# Patient Record
Sex: MALE | Race: ASIAN | NOT HISPANIC OR LATINO | Employment: UNEMPLOYED | URBAN - METROPOLITAN AREA
[De-identification: names, ages, dates, MRNs, and addresses within clinical notes are randomized per-mention and may not be internally consistent; named-entity substitution may affect disease eponyms.]

---

## 2024-01-22 ENCOUNTER — TELEPHONE (OUTPATIENT)
Dept: FAMILY MEDICINE | Facility: CLINIC | Age: 29
End: 2024-01-22
Payer: MEDICAID

## 2024-01-22 NOTE — TELEPHONE ENCOUNTER
Tasha with [facility] Refugee [facility] requesting patient to be seen this week due to the following comorbidities.     +HIV, HTN, HEP C and chemical use  VIANEY ok to see patient on 01/24 at 8:10 am.     Notified DANILO and Tasha, they will coordinate transportation. MS requested an in person . [place] to VIANEY.     Patient and family also scheduled for the following appts.   Refugee labs 01/25  Refugee screening 02/01 Dr. Zamora

## 2024-01-23 DIAGNOSIS — Z02.89 REFUGEE HEALTH EXAMINATION: Primary | ICD-10-CM

## 2024-01-24 ENCOUNTER — PATIENT OUTREACH (OUTPATIENT)
Dept: NURSING | Facility: CLINIC | Age: 29
End: 2024-01-24
Payer: MEDICAID

## 2024-01-24 ENCOUNTER — PATIENT OUTREACH (OUTPATIENT)
Dept: CARE COORDINATION | Facility: CLINIC | Age: 29
End: 2024-01-24

## 2024-01-24 ENCOUNTER — TELEPHONE (OUTPATIENT)
Dept: PULMONOLOGY | Facility: CLINIC | Age: 29
End: 2024-01-24

## 2024-01-24 ENCOUNTER — OFFICE VISIT (OUTPATIENT)
Dept: FAMILY MEDICINE | Facility: CLINIC | Age: 29
End: 2024-01-24
Payer: MEDICAID

## 2024-01-24 VITALS
SYSTOLIC BLOOD PRESSURE: 110 MMHG | WEIGHT: 140.9 LBS | BODY MASS INDEX: 23.47 KG/M2 | RESPIRATION RATE: 16 BRPM | HEIGHT: 65 IN | TEMPERATURE: 99.9 F | HEART RATE: 85 BPM | OXYGEN SATURATION: 98 % | DIASTOLIC BLOOD PRESSURE: 80 MMHG

## 2024-01-24 DIAGNOSIS — Z21 ASYMPTOMATIC HUMAN IMMUNODEFICIENCY VIRUS (HIV) INFECTION STATUS (H): Primary | ICD-10-CM

## 2024-01-24 DIAGNOSIS — F12.10 MARIJUANA ABUSE: ICD-10-CM

## 2024-01-24 DIAGNOSIS — J43.8 OTHER EMPHYSEMA (H): Primary | ICD-10-CM

## 2024-01-24 DIAGNOSIS — Z02.89 REFUGEE HEALTH EXAMINATION: ICD-10-CM

## 2024-01-24 DIAGNOSIS — Z23 NEED FOR VACCINATION: ICD-10-CM

## 2024-01-24 DIAGNOSIS — F11.21 OPIOID DEPENDENCE IN REMISSION (H): ICD-10-CM

## 2024-01-24 DIAGNOSIS — I27.20 PULMONARY HYPERTENSION (H): ICD-10-CM

## 2024-01-24 DIAGNOSIS — B18.2 CHRONIC HEPATITIS C WITHOUT HEPATIC COMA (H): ICD-10-CM

## 2024-01-24 DIAGNOSIS — Z00.00 PREVENTATIVE HEALTH CARE: ICD-10-CM

## 2024-01-24 PROCEDURE — 90471 IMMUNIZATION ADMIN: CPT | Performed by: FAMILY MEDICINE

## 2024-01-24 PROCEDURE — 99215 OFFICE O/P EST HI 40 MIN: CPT | Mod: 25 | Performed by: FAMILY MEDICINE

## 2024-01-24 PROCEDURE — 91320 SARSCV2 VAC 30MCG TRS-SUC IM: CPT | Performed by: FAMILY MEDICINE

## 2024-01-24 PROCEDURE — 90480 ADMN SARSCOV2 VAC 1/ONLY CMP: CPT | Performed by: FAMILY MEDICINE

## 2024-01-24 PROCEDURE — 90715 TDAP VACCINE 7 YRS/> IM: CPT | Performed by: FAMILY MEDICINE

## 2024-01-24 ASSESSMENT — ACTIVITIES OF DAILY LIVING (ADL): DEPENDENT_IADLS:: INDEPENDENT

## 2024-01-24 NOTE — LETTER
[facility]  Patient Centered Plan of Care  About Me:        Patient Name:  Carolina Torres    YOB: 1995  Age:         28 year old   Selvin MRN:    [id number] Telephone Information:  Home Phone [phone]   Mobile [phone]       Address:  [address]  [place] [address] Email address:  No e-mail address on record      Emergency Contact(s)    Name Relationship Lgl Grd Work Phone Home Phone Mobile Phone   1. MICHAELA JUÁREZ Spouse    [phone]   2. ORA SHELBY Other    [phone]   3. GENNARO Other    [phone]           Primary language:  Sandhya     needed? Yes   [facility] [employer]:  [phone] op. 1  Other communication barriers:Language barrier    Preferred Method of Communication:     Current living arrangement: I live in a private home with family    Mobility Status/ Medical Equipment: Independent        Health Maintenance  Health Maintenance Reviewed: Due/Overdue       My Access Plan  Medical Emergency 911   Primary Clinic Line [facility] [phone]   24 Hour Appointment Line [phone] or  [phone]) (toll-free)   24 Hour Nurse Line [phone] (toll-free)   Preferred Urgent Care [facility] [phone]     [facility]  [phone]     Preferred Pharmacy [facility] - [address]     Behavioral Health Crisis Line The National Suicide Prevention Lifeline at 1-547.102.1664 or Text/Call 158           My Care Team Members  Patient Care Team         Relationship Specialty Notifications Start End    Clinic - [facility] PCP - General   1/24/24     Phone: [phone] Fax: [phone]         [address] [facility] [address]    Nanette Morales MD Fellow Gastroenterology  1/24/24     Phone: [phone] Fax: [phone]         [address] [place] [address] [address] [facility] [address]    Ramon Torres, JACKLYNW  Community Health Worker Primary Care - CC Admissions 1/24/24     Phone: [phone] Fax: [phone]         [address] [place] [id number]    Elizabeth Heaton, RN Lead Care Coordinator Primary Care - CC Admissions 1/24/24     Hayley Andrade MD MD Critical Care  1/26/24      [address] [facility] [address]                My Care Plans  Self Management and Treatment Plan    Care Plan  Care Plan: Cardiology       Problem: Pulmonary hypertension       Goal: Patient will attend his cardiology clinic appointment in the next 6 months.       Start Date: 1/24/2024 Expected End Date: 7/31/2024    This Visit's Progress: 10%    Note:     Barriers: language barrier, low literacy, noncompliance, and lack of knowledge how to navigate complex health care system  Strengths: motivated to attend appt  Patient expressed understanding of goal: Yes    Action steps to achieve this goal:  1. I will answer my phone when I am contacted to schedule my appointment.  2. I will attend my ECHO appointment as scheduled 2/7/2024 at 9:00am.  3. I will attend my initial cardiology appointment on TBD  4. I will schedule a follow up appointment with my cardiologist if it is recommended to do so while I am at the clinic.  5. I will follow up with CCC regarding this goal at each outreach until it is completed.                               Care Plan: Pulmonology       Problem: Pulmonary hypertension       Goal: Patient will attend his pulmonology clinic appointment in the next 6 months.       Start Date: 1/24/2024 Expected End Date: 7/31/2024    This Visit's Progress: 10%    Note:     Barriers: language barrier, low literacy, noncompliance, and lack of knowledge how to navigate complex health care system  Strengths: motivated to attend appt  Patient expressed understanding of goal: Yes    Action steps to achieve this goal:  1. I will answer my phone when I am contacted to schedule my appointment.  2. I will attend my pulmonology appointment as  scheduled on  2/22/24 at 2:45pm for PFT and 3:45pm with Dr Andrade.  3. I will schedule a follow up appointment with my pulmonologist if it is recommended to do so while I am at the clinic.  4. I will follow up with CCC regarding this goal at each outreach until it is completed.                                 Action Plans on File:                       Advance Care Plans/Directives:   Advanced Care Plan/Directives on file:   No    Discussed with patient/caregiver(s):   Declined Further Information             My Medical and Care Information  Problem List   There is no problem list on file for this patient.     Current Medications and Allergies:  See printed Medication Report.    Care Coordination Start Date: 1/24/2024   Frequency of Care Coordination: No data recorded   Form Last Updated: 01/29/2024

## 2024-01-24 NOTE — TELEPHONE ENCOUNTER
Fabi, RN from pulmonary called to clarify referral to pulmonary.  Patient seen by provider today.  Diagnosis of patient condition required for pulmonary support.  If patient has pulmonary hypertension as indicated on the telephone encounter, cardiology would be more appropriate.    Pulmonary can be called to clarify at [phone] and can speak to any nursing.    Will route encounter to Dr. Du for an update.    José Miguel

## 2024-01-24 NOTE — PROGRESS NOTES
Clinic Care Coordination Contact  Clinic Care Coordination Contact  OUTREACH    Referral Information:  Referral Source: PCP    Primary Diagnosis: GI Disorders    Chief Complaint   Patient presents with    Clinic Care Coordination - Initial   Clinic Utilization  Difficulty keeping appointments:: No  Compliance Concerns: No  No-Show Concerns: No  No PCP office visit in Past Year: No  Utilization      No Show Count (past year)  0             ED Visits  0             Hospital Admissions  0                    Current as of: 1/28/2024 11:56 AM            Clinical Concerns:    [place] with spouse and 3 children.   Spouse - Henrik Per   Saw Raffy Sorianou Roberto - 9 yrs old son  Paw Raffy Ina - 6 yrs daughter  Saw Austyn Damian Roberto - 1 yr son     States they only received $75 since they arrived.   States only have a little bit of rice, and some meat.     [facility] benefits - food insecurities  -  already assisted family with [facility] benefits  - Dr Du reports no foods in home. CCRN updated  to follow up on this.  will call patient today with  to follow up on food insecurity.   - Patient agreed to be referred to [facility] for food shelves. nicolas Parikh speaking staff will reach out to patient tomorrow to drop off some foods.     : Lisset: [phone] and Manager is Kiet: [phone]    Health Insurance   - active health insurance pending insurance card to be mailed out.     [place] ID  - Patient will need to apply on his own after received work permit per .     Work permit  - still pending    Social security  - already applied after 45 days,  will follow up on status.     Echocardiogram  - scheduled on 2/7/2024 at 9am.     GI hepatology  - scheduled on 3/20/2024 at 8:00am for lab and 8:45am with Dr Nanette Morales.     Infectious disease  - CHW to assist   - staff send message to the team for review on  1/24/24.    Cardiology  - ECHO scheduled on 2/7/24  - CHW to assist     Pulmonology  - scheduled on 2/22/2024 at 2:45pm for PFT and 3:45pm with Dr Andrade    Psychiatry for longer term med mgmt  - scheduled on 5/9/2024 at 9:45am with FAWAD Collins CNP    [facility] service  - plan to referral patient for [facility] service to support with community resources.     Psychiatry and med mgmt  - CHW to follow up    Pain  Pain (GOAL):: No  Health Maintenance Reviewed: Due/Overdue   Clinical Pathway: None    Medication Management:  Medication review status: Medications reviewed and no changes reported per patient.           Functional Status:  Dependent ADLs:: Independent  Dependent IADLs:: Independent  Bed or wheelchair confined:: No  Mobility Status: Independent  Fallen 2 or more times in the past year?: No  Any fall with injury in the past year?: No    Living Situation:  Current living arrangement:: I live in a private home with family  Type of residence:: Apartment    Lifestyle & Psychosocial Needs:    Social Determinants of Health     Food Insecurity: Low Risk  (1/24/2024)    Food Insecurity     Within the past 12 months, did you worry that your food would run out before you got money to buy more?: No     Within the past 12 months, did the food you bought just not last and you didn t have money to get more?: No   Depression: Not at risk (1/24/2024)    PHQ-2     PHQ-2 Score: 0   Housing Stability: Low Risk  (1/24/2024)    Housing Stability     Do you have housing? : Yes     Are you worried about losing your housing?: No   Tobacco Use: High Risk (1/24/2024)    Patient History     Smoking Tobacco Use: Every Day     Smokeless Tobacco Use: Current     Passive Exposure: Current   Financial Resource Strain: Low Risk  (1/24/2024)    Financial Resource Strain     Within the past 12 months, have you or your family members you live with been unable to get utilities (heat, electricity) when it was really needed?: No   Alcohol Use: Not  on file   Transportation Needs: Low Risk  (1/24/2024)    Transportation Needs     Within the past 12 months, has lack of transportation kept you from medical appointments, getting your medicines, non-medical meetings or appointments, work, or from getting things that you need?: No   Physical Activity: Not on file   Interpersonal Safety: Low Risk  (1/24/2024)    Interpersonal Safety     Do you feel physically and emotionally safe where you currently live?: Yes     Within the past 12 months, have you been hit, slapped, kicked or otherwise physically hurt by someone?: No     Within the past 12 months, have you been humiliated or emotionally abused in other ways by your partner or ex-partner?: No   Stress: Not on file   Social Connections: Not on file     Diet:: Regular  Inadequate nutrition (GOAL):: No  Tube Feeding: No  Inadequate activity/exercise (GOAL):: No  Significant changes in sleep pattern (GOAL): No  Transportation means:: Regular car, Medical transport, Family     Mandaen or spiritual beliefs that impact treatment:: No  Mental health DX:: No  Mental health management concern (GOAL):: No  Chemical Dependency Status: No Current Concerns  Informal Support system:: Family, Children, [place] based, Friends, Spouse    Resources and Interventions:  Current Resources:      Community Resources: Other (see comment), [facility] Programs, County Worker  Supplies Currently Used at Home: None  Equipment Currently Used at Home: none     Advance Care Plan/Directive  Advanced Care Plans/Directives on file:: No  Discussed with patient/caregiver:: Declined Further Information    Referrals Placed: None     Care Plan:  Care Plan: Cardiology       Problem: Pulmonary hypertension       Goal: Patient will attend his cardiology clinic appointment in the next 6 months.       Start Date: 1/24/2024 Expected End Date: 7/31/2024    This Visit's Progress: 10%    Note:     Barriers: language barrier, low literacy, noncompliance, and lack of  knowledge how to navigate complex health care system  Strengths: motivated to attend appt  Patient expressed understanding of goal: Yes    Action steps to achieve this goal:  1. I will answer my phone when I am contacted to schedule my appointment.  2. I will attend my ECHO appointment as scheduled 2/7/2024 at 9:00am.  3. I will attend my initial cardiology appointment on TBD  4. I will schedule a follow up appointment with my cardiologist if it is recommended to do so while I am at the clinic.  5. I will follow up with [facility] regarding this goal at each outreach until it is completed.                               Care Plan: Pulmonology       Problem: Pulmonary hypertension       Goal: Patient will attend his pulmonology clinic appointment in the next 6 months.       Start Date: 1/24/2024 Expected End Date: 7/31/2024    This Visit's Progress: 10%    Note:     Barriers: language barrier, low literacy, noncompliance, and lack of knowledge how to navigate complex health care system  Strengths: motivated to attend appt  Patient expressed understanding of goal: Yes    Action steps to achieve this goal:  1. I will answer my phone when I am contacted to schedule my appointment.  2. I will attend my pulmonology appointment as scheduled on  2/22/24 at 2:45pm for PFT and 3:45pm with Dr Andrade.  3. I will schedule a follow up appointment with my pulmonologist if it is recommended to do so while I am at the clinic.  4. I will follow up with [facility] regarding this goal at each outreach until it is completed.                                  Future Appointments                In 3 days Minh Zamora MD; PHONE,  [facility] MOHINDER Frausto    In 1 week JN ECHO OP 2; JN HC ECHO STAFF [facility]    In 3 weeks MPBE PFT RM 2 M [facility] Specialty [facility] Beam, Beam    In 3 weeks Hayley Andrade MD [facility] Beam Beam    In 1 month  Rosalie Adams MD [facility] MOHINDER Frausto SPRO    In 1 month UC LAB [facility] Lab [facility]    In 1 month Nanette Morales MD [facility] Hepatology Clinic [facility]    In 3 months Codie Goode APRN CNP [facility] Mental Health & Addiction [facility]            Plan: CHW to assist wit ID and cardiology appts.

## 2024-01-24 NOTE — PROGRESS NOTES
Clinic Care Coordination Contact  Community Health Worker Initial Outreach    CHW Initial Information Gathering:  Referral Source: PCP  Preferred Hospital: [facility]  [phone]  Preferred Urgent Care: [facility], [phone]  Current living arrangement:: I live in a private home with family  Type of residence:: Apartment  Community Resources: Other (see comment)  Supplies Currently Used at Home: None  Equipment Currently Used at Home: none  Informal Support system:: Family  No PCP office visit in Past Year: No  Transportation means:: Medical transport, Other  CHW Additional Questions  If ED/Hospital discharge, follow-up appointment scheduled as recommended?: N/A  Medication changes made following ED/Hospital discharge?: N/A  MyChart active?: No  Patient agreeable to assistance with activating MyChart?: No    Patient accepts CC: Yes. Patient scheduled for assessment with CCC RN on 1/24/2024 at 11:00 AM. Patient noted desire to discuss family has no food; newly arrived refugees, he has multiple medical problems and needs help to get to all specialty appointments.     Per patient, he's been in the [place] only 1 [place] plus and has some foods at home but has no money. CCC was able to confirmed with resettlement agency  that they dropped out some foods and provided medical transportation to the clinic today. CCC RN is aware of the situation and on her raider to connect patient with resettlement agency and /manager.     /manager: Lisset: [phone] and Manager is Kiet: [phone].

## 2024-01-24 NOTE — TELEPHONE ENCOUNTER
" Health Call Center    Phone Message    May a detailed message be left on voicemail: yes     Reason for Call: Appointment Intake    Referring Provider Name: MADY PATTERSON    Diagnosis and/or Symptoms: Priority: 1-2 Weeks    Additional Information: new refugee from [place]; has pulmonary hypertension and other problems     Per protocols states: confirmed by right heart cath/angiogram. Writer unsure if this pt should be scheduled in general  or if the referral should be placed with cardiology. Please review since \"other problems\" are mentioned. Thank you     Action Taken: Message routed to:  [facility] pulmonology    Travel Screening: Not Applicable                                                                   "

## 2024-01-24 NOTE — TELEPHONE ENCOUNTER
Contacted the office of Dr Du for clarification on Pulmonary Referral as dx states 'Pumonary Hypertension and other problems'.  Per RN José Miguel Jarvis, she will check with MD and return call to clinic with clarification so that pt can be scheduled with appropriate specialty.

## 2024-01-24 NOTE — CONFIDENTIAL NOTE
DIAGNOSIS:  Chronic hepatitis C without hepatic coma (H)    Appt Date:  03.20.2024    NOTES STATUS DETAILS   OFFICE NOTE from referring provider Internal 01.24.2024 Brina Du MD    OFFICE NOTES from other specialists     DISCHARGE SUMMARY from hospital     MEDICATION LIST     LIVER BIOSPY (IF APPLICABLE)      PATHOLOGY REPORTS      IMAGING     ENDOSCOPY (IF AVAILABLE)     COLONOSCOPY (IF AVAILABLE)     ULTRASOUND LIVER     CT OF ABDOMEN     MRI OF LIVER     FIBROSCAN, US ELASTOGRAPHY, FIBROSIS SCAN, MR ELASTOGRAPHY     LABS     HEPATIC PANEL (LIVER PANEL)     BASIC METABOLIC PANEL     COMPLETE METABOLIC PANEL     COMPLETE BLOOD COUNT (CBC)     INTERNATIONAL NORMALIZED RATIO (INR)     HEPATITIS C ANTIBODY     HEPATITIS C VIRAL LOAD/PCR     HEPATITIS C GENOTYPE     HEPATITIS B SURFACE ANTIGEN     HEPATITIS B SURFACE ANTIBODY     HEPATITIS B DNA QUANT LEVEL     HEPATITIS B CORE ANTIBODY

## 2024-01-24 NOTE — PROGRESS NOTES
Assessment & Plan     This patient will have a full regular Refugee Health Exam next week. Today's visit was to name his major problems and initiate treatments/referrals so management in the [place] could begin.    Asymptomatic human immunodeficiency virus (HIV) infection status (H)    - Adult Infectious Disease  Referral  - Adult GI  Referral - Consult Only  - Primary Care - Care Coordination Referral    Pulmonary hypertension (H)  This is noted in his overseas paperwork, however, it is not clear how they diagnosed it. I will refer him to pulmonary and to cardiology and I will order an echocardiogram to get things started. His blood pressure today is good. I tried to explain to him that this problem is hurting his lungs and heart, so he really should stop smoking as that will additionally damage the lungs and heart.   - Adult Pulmonary Medicine  Referral  - Adult Cardiology Eval  Referral  - Adult GI  Referral - Consult Only  - Echocardiogram Complete  - Primary Care - Care Coordination Referral    Chronic hepatitis C without hepatic coma (H)  Refer to infectious disease for help with this. While this is important, it is likely a problem that can set aside while other issues are addressed first.  - Adult GI  Referral - Consult Only  - Echocardiogram Complete  - Primary Care - Care Coordination Referral    Emphysema  In light of his other medical problems, current smoking plus cannabis, examining doctor would appreciate pulmonary evaluation for recommendations on how best to help his breathing.    Preventative health care  reviewed  - REVIEW OF HEALTH MAINTENANCE PROTOCOL ORDERS  - Primary Care - Care Coordination Referral    Need for vaccination  Gave 2 shots today with his permission  - COVID-19 12+ (2023-24) (PFIZER)  - TDAP 10-64Y (ADACEL,BOOSTRIX)  - Primary Care - Care Coordination Referral    Opioid dependence in remission (H)  This is clearly noted in his  overseas paperwork. It is not clear how he transitioned from using to remission.  - Adult Mental Health  Referral  - Primary Care - Care Coordination Referral    Marijuana abuse  This is also a previous problem from his past. Will need to monitor him for future use  - Adult Mental Health  Referral  - Primary Care - Care Coordination Referral    Refugee health examination  While the arrival agency's  is likely the one in charge right now, he says his family is out of food. He knows he has phone numbers on his phone but he has no idea which number to call for what problem . I offered some support to help him get the message through that they need more food or help to get it.  - Primary Care - Care Coordination Referral      Review of external notes as documented elsewhere in note  Ordering of each unique test  Prescription drug management  62 minutes spent by me on the date of the encounter doing chart review, history and exam, documentation and further activities per the note      Nicotine/Tobacco Cessation  He reports that he has been smoking cigarettes. He has been exposed to tobacco smoke. His smokeless tobacco use includes chew.  Nicotine/Tobacco Cessation Plan  Self help information given to patient    I note that it is interesting that as he told me about his health problems, he started by saying the problems began when he . Most of the problems he has come from his alcohol and drug use BEFORE he got .             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  Helps his wife to care for  "their 3 kids. When helping, he feels able to do that.    Past health problems:  Used alcohol and drugs - but was not addicted.  Smokes now. Has some SOB when walking/doing stairs.  Last night drank alcohol with friends - claims he does not drink daily  No drugs    Since arrival, he thinks he and his family are good.  Parents have been in [place] for 8 years already. They are helping his family.    Thinks he could do a job. He hopes to go to school before getting a job. He wants to study to read.        Objective    /80   Pulse 85   Temp 99.9  F (37.7  C) (Oral)   Resp 16   Ht 1.652 m (5' 5.04\")   Wt 63.9 kg (140 lb 14.4 oz)   SpO2 98%   BMI 23.42 kg/m    Body mass index is 23.42 kg/m .  Physical Exam   GENERAL: alert and no distress  EYES: Eyes grossly normal to inspection, PERRL and conjunctivae and sclerae normal  NECK: right JVP noted  RESP: lungs clear to auscultation - no rales, rhonchi or wheezes but he has prolonged expiration  MS: no gross musculoskeletal defects noted, no edema  PSYCH: mentation appears normal, affect flat, judgement and insight intact, judgement and insight impaired, and appearance well groomed    No results found for any visits on 01/24/24.        Signed Electronically by: Brina Du MD    "

## 2024-01-25 ENCOUNTER — TELEPHONE (OUTPATIENT)
Dept: FAMILY MEDICINE | Facility: CLINIC | Age: 29
End: 2024-01-25

## 2024-01-25 ENCOUNTER — LAB (OUTPATIENT)
Dept: LAB | Facility: CLINIC | Age: 29
End: 2024-01-25
Payer: MEDICAID

## 2024-01-25 DIAGNOSIS — Z02.89 REFUGEE HEALTH EXAMINATION: ICD-10-CM

## 2024-01-25 LAB
BASOPHILS # BLD AUTO: 0 10E3/UL (ref 0–0.2)
BASOPHILS NFR BLD AUTO: 1 %
EOSINOPHIL # BLD AUTO: 0.1 10E3/UL (ref 0–0.7)
EOSINOPHIL NFR BLD AUTO: 3 %
ERYTHROCYTE [DISTWIDTH] IN BLOOD BY AUTOMATED COUNT: 13.7 % (ref 10–15)
HCT VFR BLD AUTO: 37.8 % (ref 40–53)
HGB BLD-MCNC: 12.4 G/DL (ref 13.3–17.7)
IMM GRANULOCYTES # BLD: 0 10E3/UL
IMM GRANULOCYTES NFR BLD: 0 %
LYMPHOCYTES # BLD AUTO: 1.9 10E3/UL (ref 0.8–5.3)
LYMPHOCYTES NFR BLD AUTO: 35 %
MCH RBC QN AUTO: 34.2 PG (ref 26.5–33)
MCHC RBC AUTO-ENTMCNC: 32.8 G/DL (ref 31.5–36.5)
MCV RBC AUTO: 104 FL (ref 78–100)
MONOCYTES # BLD AUTO: 0.5 10E3/UL (ref 0–1.3)
MONOCYTES NFR BLD AUTO: 9 %
NEUTROPHILS # BLD AUTO: 2.9 10E3/UL (ref 1.6–8.3)
NEUTROPHILS NFR BLD AUTO: 52 %
NRBC # BLD AUTO: 0 10E3/UL
NRBC BLD AUTO-RTO: 0 /100
PLATELET # BLD AUTO: 99 10E3/UL (ref 150–450)
RBC # BLD AUTO: 3.63 10E6/UL (ref 4.4–5.9)
WBC # BLD AUTO: 5.4 10E3/UL (ref 4–11)

## 2024-01-25 PROCEDURE — 86780 TREPONEMA PALLIDUM: CPT

## 2024-01-25 PROCEDURE — 87522 HEPATITIS C REVRS TRNSCRPJ: CPT

## 2024-01-25 PROCEDURE — 85025 COMPLETE CBC W/AUTO DIFF WBC: CPT

## 2024-01-25 PROCEDURE — 86803 HEPATITIS C AB TEST: CPT

## 2024-01-25 PROCEDURE — 86702 HIV-2 ANTIBODY: CPT | Mod: 59

## 2024-01-25 PROCEDURE — 86481 TB AG RESPONSE T-CELL SUSP: CPT

## 2024-01-25 PROCEDURE — 86706 HEP B SURFACE ANTIBODY: CPT

## 2024-01-25 PROCEDURE — 86701 HIV-1ANTIBODY: CPT | Mod: 59

## 2024-01-25 PROCEDURE — 99000 SPECIMEN HANDLING OFFICE-LAB: CPT

## 2024-01-25 PROCEDURE — 80053 COMPREHEN METABOLIC PANEL: CPT

## 2024-01-25 PROCEDURE — 87902 NFCT AGT GNTYP ALYS HEP C: CPT | Mod: 90

## 2024-01-25 PROCEDURE — 36415 COLL VENOUS BLD VENIPUNCTURE: CPT

## 2024-01-25 PROCEDURE — 86682 HELMINTH ANTIBODY: CPT | Mod: 90

## 2024-01-25 PROCEDURE — 86787 VARICELLA-ZOSTER ANTIBODY: CPT

## 2024-01-25 PROCEDURE — 86704 HEP B CORE ANTIBODY TOTAL: CPT

## 2024-01-25 PROCEDURE — 80061 LIPID PANEL: CPT

## 2024-01-25 PROCEDURE — 87389 HIV-1 AG W/HIV-1&-2 AB AG IA: CPT

## 2024-01-25 PROCEDURE — 87340 HEPATITIS B SURFACE AG IA: CPT

## 2024-01-25 NOTE — PROGRESS NOTES
[facility]   - JENNI referred 1 yr son son to [facility] today appt scheduled on 1/31/2024 at 12:30pm at [facility] location. Deannada will provider ride. Instructed parents to bring proof of resident such as letter or mail, no ID yet and refugee travel documents.     [employer]  - JENNI referred patient to [employer] Me [employer] on 1/29/2024    Thank you for submitting this connection to the local school district or cooperative on behalf of this child. The information from this form will go directly through secure electronic process to the district or cooperative based on the child's address provided.    Please save your referral ID for future reference or when calling Help Me Grow.    Your referral ID is [id number]    School enrollment  - Jl from [employer] will take the 2 older kids to school if not done yet by next week by renita manager.

## 2024-01-25 NOTE — TELEPHONE ENCOUNTER
This is new refugee and usually we'd like  from the resettlement agency handle due difficulties with insurance and complex. I am hoping they can help with transportation because they work closely with [employer] and know a way around to coordinate this. [facility] have high panel to mange at this time but if they can help, would greatly appreciate it.

## 2024-01-25 NOTE — TELEPHONE ENCOUNTER
Care Coordinator who brought patient to clinic had questions regarding ongoing appointments. Will [facility] staff be assisting with transportation requests for future speciality appointments.     WI added Amando's information into patient contacts.

## 2024-01-26 ENCOUNTER — TELEPHONE (OUTPATIENT)
Dept: PULMONOLOGY | Facility: CLINIC | Age: 29
End: 2024-01-26
Payer: MEDICAID

## 2024-01-26 DIAGNOSIS — I27.20 PULMONARY HYPERTENSION (H): Primary | ICD-10-CM

## 2024-01-26 LAB
ALBUMIN SERPL BCG-MCNC: 3.7 G/DL (ref 3.5–5.2)
ALP SERPL-CCNC: 121 U/L (ref 40–150)
ALT SERPL W P-5'-P-CCNC: 51 U/L (ref 0–70)
ANION GAP SERPL CALCULATED.3IONS-SCNC: 8 MMOL/L (ref 7–15)
AST SERPL W P-5'-P-CCNC: 66 U/L (ref 0–45)
BILIRUB SERPL-MCNC: 1 MG/DL
BUN SERPL-MCNC: 8.8 MG/DL (ref 6–20)
CALCIUM SERPL-MCNC: 9 MG/DL (ref 8.6–10)
CHLORIDE SERPL-SCNC: 107 MMOL/L (ref 98–107)
CHOLEST SERPL-MCNC: 72 MG/DL
CREAT SERPL-MCNC: 0.95 MG/DL (ref 0.67–1.17)
DEPRECATED HCO3 PLAS-SCNC: 21 MMOL/L (ref 22–29)
EGFRCR SERPLBLD CKD-EPI 2021: >90 ML/MIN/1.73M2
FASTING STATUS PATIENT QL REPORTED: ABNORMAL
GLUCOSE SERPL-MCNC: 89 MG/DL (ref 70–99)
HBV CORE AB SERPL QL IA: REACTIVE
HBV SURFACE AB SERPL IA-ACNC: 37.3 M[IU]/ML
HBV SURFACE AB SERPL IA-ACNC: REACTIVE M[IU]/ML
HBV SURFACE AG SERPL QL IA: NONREACTIVE
HCV AB SERPL QL IA: REACTIVE
HDLC SERPL-MCNC: 19 MG/DL
HIV 1+2 AB+HIV1 P24 AG SERPL QL IA: REACTIVE
LDLC SERPL CALC-MCNC: 45 MG/DL
NONHDLC SERPL-MCNC: 53 MG/DL
POTASSIUM SERPL-SCNC: 3.7 MMOL/L (ref 3.4–5.3)
PROT SERPL-MCNC: 8.1 G/DL (ref 6.4–8.3)
SODIUM SERPL-SCNC: 136 MMOL/L (ref 135–145)
T PALLIDUM AB SER QL: NONREACTIVE
TRIGL SERPL-MCNC: 41 MG/DL
VZV IGG SER QL IA: >4000 INDEX
VZV IGG SER QL IA: POSITIVE

## 2024-01-26 NOTE — TELEPHONE ENCOUNTER
Rico Wilson,   Thank you for reaching out. I referred patient to [facility] for armhs service. Jl will assist with transportation.

## 2024-01-26 NOTE — TELEPHONE ENCOUNTER
[facility] Call Center    Phone Message    May a detailed message be left on voicemail: yes     Reason for Call: Appointment Intake    Referring Provider Name: Brina Du MD   Diagnosis and/or Symptoms: Per patient new refugee from [place]; has pulmonary hypertension and other problems REFERRAL STATES 1-2 weeks    Action Taken: Other: PULM    Travel Screening: Not Applicable

## 2024-01-27 LAB
GAMMA INTERFERON BACKGROUND BLD IA-ACNC: 0.25 IU/ML
M TB IFN-G BLD-IMP: NEGATIVE
M TB IFN-G CD4+ BCKGRND COR BLD-ACNC: 9.75 IU/ML
MITOGEN IGNF BCKGRD COR BLD-ACNC: -0.08 IU/ML
MITOGEN IGNF BCKGRD COR BLD-ACNC: -0.08 IU/ML
QUANTIFERON MITOGEN: 10 IU/ML
QUANTIFERON NIL TUBE: 0.25 IU/ML
QUANTIFERON TB1 TUBE: 0.17 IU/ML
QUANTIFERON TB2 TUBE: 0.17
SCHISTOSOMA IGG SER IA-ACNC: 6 U

## 2024-01-28 LAB — STRONGYLOIDES IGG SER IA-ACNC: 0.4 IV

## 2024-01-29 LAB
HIV 1+2 AB+HIV1P24 AG SERPLBLD IA.RAPID: ABNORMAL
HIV 2 AB SERPLBLD QL IA.RAPID: NONREACTIVE
HIV1 AB SERPLBLD QL IA.RAPID: REACTIVE

## 2024-01-30 PROBLEM — J43.8 OTHER EMPHYSEMA (H): Status: ACTIVE | Noted: 2024-01-30

## 2024-01-30 LAB
HCV RNA SERPL NAA+PROBE-ACNC: ABNORMAL IU/ML
HCV RNA SERPL NAA+PROBE-LOG IU: 6.9 {LOG_IU}/ML

## 2024-01-30 NOTE — TELEPHONE ENCOUNTER
Writer returned call to speak with Fabi from [facility] Pulmonary scheduling. Dr. Du's below message relayed to Fabi. It appears patient is already scheduled to see Pulmonary with Dr. Hayley Andrade at the [place] location on 2/22/2024 @ 4:00 PM.     Fabi requested encounter to be routed to Dr. Hayley Andrade and Care Team so they can be aware of the purpose of referral.    Will will route this encounter to provider and care team as requested.    Chiquis Masters, ASHLEYN, RN   [facility]

## 2024-01-30 NOTE — TELEPHONE ENCOUNTER
Dr. Du would like pulmonary to help to assess patient for COPD and to recommend therapy in light of his pulmonary hypertension and other complicating medical problems. Sorry my note was not finished in time to make this more clear.

## 2024-02-01 ENCOUNTER — TELEPHONE (OUTPATIENT)
Dept: FAMILY MEDICINE | Facility: CLINIC | Age: 29
End: 2024-02-01

## 2024-02-01 ENCOUNTER — OFFICE VISIT (OUTPATIENT)
Dept: FAMILY MEDICINE | Facility: CLINIC | Age: 29
End: 2024-02-01
Payer: MEDICAID

## 2024-02-01 VITALS
RESPIRATION RATE: 16 BRPM | BODY MASS INDEX: 24.03 KG/M2 | OXYGEN SATURATION: 98 % | DIASTOLIC BLOOD PRESSURE: 60 MMHG | TEMPERATURE: 98.3 F | WEIGHT: 144.25 LBS | HEART RATE: 82 BPM | HEIGHT: 65 IN | SYSTOLIC BLOOD PRESSURE: 98 MMHG

## 2024-02-01 DIAGNOSIS — B20 HUMAN IMMUNODEFICIENCY VIRUS (HIV) DISEASE (H): ICD-10-CM

## 2024-02-01 DIAGNOSIS — T19.4XXS: ICD-10-CM

## 2024-02-01 DIAGNOSIS — D69.6 THROMBOCYTOPENIA (H): ICD-10-CM

## 2024-02-01 DIAGNOSIS — Z86.11 HISTORY OF TUBERCULOSIS: ICD-10-CM

## 2024-02-01 DIAGNOSIS — Z02.89 REFUGEE HEALTH EXAMINATION: Primary | ICD-10-CM

## 2024-02-01 DIAGNOSIS — I27.20 PULMONARY HYPERTENSION (H): ICD-10-CM

## 2024-02-01 DIAGNOSIS — D64.9 ANEMIA, UNSPECIFIED TYPE: ICD-10-CM

## 2024-02-01 DIAGNOSIS — F10.29 ALCOHOL DEPENDENCE WITH UNSPECIFIED ALCOHOL-INDUCED DISORDER (H): ICD-10-CM

## 2024-02-01 DIAGNOSIS — B18.2 CHRONIC HEPATITIS C WITHOUT HEPATIC COMA (H): ICD-10-CM

## 2024-02-01 PROCEDURE — 90471 IMMUNIZATION ADMIN: CPT | Performed by: FAMILY MEDICINE

## 2024-02-01 PROCEDURE — 90472 IMMUNIZATION ADMIN EACH ADD: CPT | Performed by: FAMILY MEDICINE

## 2024-02-01 PROCEDURE — 99213 OFFICE O/P EST LOW 20 MIN: CPT | Mod: 25 | Performed by: FAMILY MEDICINE

## 2024-02-01 PROCEDURE — 90632 HEPA VACCINE ADULT IM: CPT | Performed by: FAMILY MEDICINE

## 2024-02-01 PROCEDURE — 90677 PCV20 VACCINE IM: CPT | Performed by: FAMILY MEDICINE

## 2024-02-01 PROCEDURE — 99395 PREV VISIT EST AGE 18-39: CPT | Mod: 25 | Performed by: FAMILY MEDICINE

## 2024-02-01 NOTE — TELEPHONE ENCOUNTER
----- Message from Sharon Crocker sent at 2/1/2024 12:00 PM CST -----  Pt stopped into [facility] office for appt to be schedule with Infectious Disease.  We were unable to schedule due to no positive assurance if TB is currently active or latent. PCP is aware that he will have to place a new referral after we get the results of the Chest X Ray per . Will check in a few days for update of referral. Will need to call Pt to help schedule.

## 2024-02-01 NOTE — PROGRESS NOTES
ASSESMENT AND PLAN:    Refugee Screening - Reviewed overseas paperwork.  Confirmed pre-departure anti-parasite treatment.  Reviewed refugee screening labs.  Immunization review and update done, see flowsheet.  Refugee mental health screen done, see results below.  Reviewed healthy lifestyle issues and resettlement issues.  Encouraged dental follow-up in coordination with NANCY.  Discussed age appropriate cancer screening but no invasive exam or testing done on initial visit with new arrival refugee patients.  -     HEPATITIS A 19+ (HAVRIX/VAQTA)  -     PNEUMOCOCCAL 20 VALENT CONJUGATE (PREVNAR 20)  Pulmonary hypertension (H)  Patient has already been seen for this by my partner Dr. Du, see her note for details.  -     PNEUMOCOCCAL 20 VALENT CONJUGATE (PREVNAR 20)  Human immunodeficiency virus (HIV) disease (H)  -     PNEUMOCOCCAL 20 VALENT CONJUGATE (PREVNAR 20)  -     Adult Infectious Disease  Referral; Future  The care coordination team is going to help coordinate infectious disease follow-up and medication adherence.  He is currently taking triple drug therapy and will continue to take that until his care is taken over by the infectious disease team here in [place].  He has he estimates about a 2-month supply remaining at home.  Chronic hepatitis C without hepatic coma (H)  Added to his problem list.  Has been referred to gastroenterology.  History of tuberculosis per overseas paperwork, negative quantiferon  Chest x-ray done overseas was negative for any findings consistent with tuberculosis.  Review of systems is negative.  Likely a history of latent TB, will get infectious disease expertise as detailed above to determine whether any further testing or treatment would be warranted.  Anemia, unspecified type, Thrombocytopenia (H24)  Likely related to his chronic disease as detailed above.  Plan to recheck CBC in 4 months.  Foreign body of the penis   Patient had an overseas injection performed  substance and has some minimal symptoms related to that is detailed below.  Counseled the patient that if he has progressively worsening symptoms we could refer him to urology although there may not be anything that could be done that would have a favorable risk-benefit.  Alcohol dependence with unspecified alcohol-induced disorder (H)  Counseling done on the importance of abstinence from alcohol to prevent progression of his liver disease and heart disease.  Counseling done with the patient with the help of a professional .  He has upcoming psychiatric appointments.      HPI: Medically complex and psychiatrically complex patient here for refugee screening.  Was already seen by my partner for the acute medical issues.  On exam he is noted to have an abnormal penis.  The patient reports that he had a unknown substance injected into the penis overseas.  Since then he gets some pain with erections and sexual activity, no problems with urination.    ROS: No recurring fevers, no chronic cough, no hemoptysis, no night sweats, no chest pain, he does have some intermittent mild shortness of breath.   no blood in the urine or blood in the stool.  Remainder of review of systems is as above or negative.    Past Medical History:   Diagnosis Date    Alcohol dependence (H)     Cannabis abuse     Current smoker     Hepatitis C     Human immunodeficiency virus (HIV) disease (H) 2019    upon arrival is on Dolutegravir/Lamivudine/Tenovir 50/300/300  one daily; CD4 788 (7/23)    Opioid use disorder, moderate, in sustained remission (H)     diagnosis [place] in [place] 1/[place]24    Other ascites     Pulmonary hypertension (H)     severely impaired right ventricular systolic function    Reaction to QuantiFERON-TB test (QFT) without active tuberculosis     class B1 upon arrival to [place]    Tattoos     right hand, left leg       Social History     Socioeconomic History    Marital status:      Spouse name: None    Number of  children: None    Years of education: None    Highest education level: None   Tobacco Use    Smoking status: Every Day     Types: Cigarettes     Passive exposure: Current    Smokeless tobacco: Current     Types: Chew    Tobacco comments:     Betel nut with tobacco    Vaping Use    Vaping Use: Never used   Social History Narrative    As of 2024        [place]rival in [place]: 2024        Marrital status:  to  Henrik Griffin ( 1997) as of         Living situation: lives in an apartment with spouse and 3 kids            Languages spoken: Jac Arthur            Past employment: farmer            Place of birth: [place], [facility]    Years in a refugee camp: 27 (all life until coming to [place])        Family not in [place]:     Family in [place]:         Education previous [place] to the [place]: grade 2    Education started in the [place]:none            Synagogue: not asked        A#: [phone]         Social Determinants of Health     Financial Resource Strain: Low Risk  (2024)    Financial Resource Strain     Within the past 12 months, have you or your family members you live with been unable to get utilities (heat, electricity) when it was really needed?: No   Food Insecurity: Low Risk  (2024)    Food Insecurity     Within the past 12 months, did you worry that your food would run out before you got money to buy more?: No     Within the past 12 months, did the food you bought just not last and you didn t have money to get more?: No   Transportation Needs: Low Risk  (2024)    Transportation Needs     Within the past 12 months, has lack of transportation kept you from medical appointments, getting your medicines, non-medical meetings or appointments, work, or from getting things that you need?: No   Interpersonal Safety: Low Risk  (2024)    Interpersonal Safety     Do you feel physically and emotionally safe where you currently live?: Yes     Within the past 12 months, have you been hit, slapped,  "kicked or otherwise physically hurt by someone?: No     Within the past 12 months, have you been humiliated or emotionally abused in other ways by your partner or ex-partner?: No   Housing Stability: Low Risk  (1/24/2024)    Housing Stability     Do you have housing? : Yes     Are you worried about losing your housing?: No       OBJECTICE: BP 98/60   Pulse 82   Temp 98.3  F (36.8  C) (Oral)   Resp 16   Ht 1.655 m (5' 5.16\")   Wt 65.4 kg (144 lb 4 oz)   SpO2 98%   BMI 23.89 kg/m     Gen - alert, orientated, NAD  Eyes - fundascopic exam limited by the undialated pupil but looks symmetric  ENT - oropharynx clear, TMs clear  Neck - supple, no palpable mass or lymphadenopathy  CV - RRR, no murmur  Resp - lungs CTA  Ab - soft, nontender, no palpable mass or organomegaly   - abnormal appearance to the penis -subcutaneous foreign body causing difficulty with complete retraction of the foreskin over the glans, normal testicular exam bilaterally, no hernia  Extrem - warm, no edema  Neuro - CN II-XII intact, strength, sensation, reflexes intact and symmetric  Skin - no rash, no atypical appearing lesions seen.       Recent Results (from the past 672 hour(s))   Comprehensive metabolic panel    Collection Time: 01/25/24  1:42 PM   Result Value Ref Range    Sodium 136 135 - 145 mmol/L    Potassium 3.7 3.4 - 5.3 mmol/L    Carbon Dioxide (CO2) 21 (L) 22 - 29 mmol/L    Anion Gap 8 7 - 15 mmol/L    Urea Nitrogen 8.8 6.0 - 20.0 mg/dL    Creatinine 0.95 0.67 - 1.17 mg/dL    GFR Estimate >90 >60 mL/min/1.73m2    Calcium 9.0 8.6 - 10.0 mg/dL    Chloride 107 98 - 107 mmol/L    Glucose 89 70 - 99 mg/dL    Alkaline Phosphatase 121 40 - 150 U/L    AST 66 (H) 0 - 45 U/L    ALT 51 0 - 70 U/L    Protein Total 8.1 6.4 - 8.3 g/dL    Albumin 3.7 3.5 - 5.2 g/dL    Bilirubin Total 1.0 <=1.2 mg/dL   Hepatitis B core antibody    Collection Time: 01/25/24  1:42 PM   Result Value Ref Range    Hepatitis B Core Antibody Total Reactive (A) " Nonreactive   Hepatitis B Surface Antibody    Collection Time: 01/25/24  1:42 PM   Result Value Ref Range    Hepatitis B Surface Antibody Reactive     Hepatitis B Surface Antibody Instrument Value 37.30 <8.5 m[IU]/mL   Hepatitis B surface antigen    Collection Time: 01/25/24  1:42 PM   Result Value Ref Range    Hepatitis B Surface Antigen Nonreactive Nonreactive   Hepatitis C Screen Reflex to HCV RNA Quant and Genotype    Collection Time: 01/25/24  1:42 PM   Result Value Ref Range    Hepatitis C Antibody Reactive (A) Nonreactive   HIV Antigen Antibody Combo Cascade    Collection Time: 01/25/24  1:42 PM   Result Value Ref Range    HIV Antigen Antibody Combo Reactive (A) Nonreactive   Varicella Zoster Virus Antibody IgG    Collection Time: 01/25/24  1:42 PM   Result Value Ref Range    VZV Josefina IgG Instrument Value >4,000.0 <135.0 Index    Varicella Zoster Antibody IgG Positive    Treponema Abs w Reflex to RPR and Titer    Collection Time: 01/25/24  1:42 PM   Result Value Ref Range    Treponema Antibody Total Nonreactive Nonreactive   Strongyloides antibody IgG    Collection Time: 01/25/24  1:42 PM   Result Value Ref Range    Strongyloides Josefina IgG 0.4 <=0.9 IV   Schistosoma Antibody IgG    Collection Time: 01/25/24  1:42 PM    Specimen: Vein; Blood   Result Value Ref Range    Schistosoma Antibody IgG 6 <=8 U   Lipid panel reflex to direct LDL Fasting    Collection Time: 01/25/24  1:42 PM   Result Value Ref Range    Cholesterol 72 <200 mg/dL    Triglycerides 41 <150 mg/dL    Direct Measure HDL 19 (L) >=40 mg/dL    LDL Cholesterol Calculated 45 <=100 mg/dL    Non HDL Cholesterol 53 <130 mg/dL    Patient Fasting > 8hrs? Unknown    CBC with platelets and differential    Collection Time: 01/25/24  1:42 PM   Result Value Ref Range    WBC Count 5.4 4.0 - 11.0 10e3/uL    RBC Count 3.63 (L) 4.40 - 5.90 10e6/uL    Hemoglobin 12.4 (L) 13.3 - 17.7 g/dL    Hematocrit 37.8 (L) 40.0 - 53.0 %     (H) 78 - 100 fL    MCH 34.2 (H)  26.5 - 33.0 pg    MCHC 32.8 31.5 - 36.5 g/dL    RDW 13.7 10.0 - 15.0 %    Platelet Count 99 (L) 150 - 450 10e3/uL    % Neutrophils 52 %    % Lymphocytes 35 %    % Monocytes 9 %    % Eosinophils 3 %    % Basophils 1 %    % Immature Granulocytes 0 %    NRBCs per 100 WBC 0 <1 /100    Absolute Neutrophils 2.9 1.6 - 8.3 10e3/uL    Absolute Lymphocytes 1.9 0.8 - 5.3 10e3/uL    Absolute Monocytes 0.5 0.0 - 1.3 10e3/uL    Absolute Eosinophils 0.1 0.0 - 0.7 10e3/uL    Absolute Basophils 0.0 0.0 - 0.2 10e3/uL    Absolute Immature Granulocytes 0.0 <=0.4 10e3/uL    Absolute NRBCs 0.0 10e3/uL   Quantiferon TB Gold Plus Grey Tube    Collection Time: 01/25/24  1:42 PM    Specimen: Peripheral Blood   Result Value Ref Range    Quantiferon Nil Tube 0.25 IU/mL   Quantiferon TB Gold Plus Green Tube    Collection Time: 01/25/24  1:42 PM    Specimen: Peripheral Blood   Result Value Ref Range    Quantiferon TB1 Tube 0.17 IU/mL   Quantiferon TB Gold Plus Yellow Tube    Collection Time: 01/25/24  1:42 PM    Specimen: Peripheral Blood   Result Value Ref Range    Quantiferon TB2 Tube 0.17    Quantiferon TB Gold Plus Purple Tube    Collection Time: 01/25/24  1:42 PM    Specimen: Peripheral Blood   Result Value Ref Range    Quantiferon Mitogen 10.00 IU/mL   Hepatitis C RNA, Quantitative by PCR with Confirmatory Reflex to Genotyping    Collection Time: 01/25/24  1:42 PM   Result Value Ref Range    Hepatitis C log 6.9     Hepatitis C RNA IU/mL, Instrument 8,590,000 (H) <=1 IU/mL   HIV 1/2 Supplemental Assay    Collection Time: 01/25/24  1:42 PM   Result Value Ref Range    HIV 1 Result Reactive (A) Nonreactive    HIV 2 Result Nonreactive Nonreactive    HIV Supplemental Interpretation HIV-1 POSITIVE    Quantiferon TB Gold Plus    Collection Time: 01/25/24  1:42 PM    Specimen: Peripheral Blood   Result Value Ref Range    Quantiferon-TB Gold Plus Negative Negative    TB1 Ag minus Nil Value -0.08 IU/mL    TB2 Ag minus Nil Value -0.08 IU/mL    Mitogen  minus Nil Result 9.75 IU/mL    Nil Result 0.25 IU/mL         Mental Health screening: Not completed.  The patient has a complex known psychiatric history and already has been referred and has an upcoming psychiatric appointment scheduled.  A copy of the overseas psychiatric consultation is being scanned into the electronic health record.    Minh Zamora MD   5:55 PM 2/1/2024       Prior to immunization administration, verified patients identity using patient s name and date of birth. Please see Immunization Activity for additional information.     Screening Questionnaire for Adult Immunization    Are you sick today?   No   Do you have allergies to medications, food, a vaccine component or latex?   No   Have you ever had a serious reaction after receiving a vaccination?   No   Do you have a long-term health problem with heart, lung, kidney, or metabolic disease (e.g., diabetes), asthma, a blood disorder, no spleen, complement component deficiency, a cochlear implant, or a spinal fluid leak?  Are you on long-term aspirin therapy?   Yes, kidney and heart problems   Do you have cancer, leukemia, HIV/AIDS, or any other immune system problem?   Yes, HIV   Do you have a parent, brother, or sister with an immune system problem?   No   In the past 3 months, have you taken medications that affect  your immune system, such as prednisone, other steroids, or anticancer drugs; drugs for the treatment of rheumatoid arthritis, Crohn s disease, or psoriasis; or have you had radiation treatments?   Yes   Have you had a seizure, or a brain or other nervous system problem?   No   During the past year, have you received a transfusion of blood or blood    products, or been given immune (gamma) globulin or antiviral drug?   No   For women: Are you pregnant or is there a chance you could become       pregnant during the next month?   No   Have you received any vaccinations in the past 4 weeks?   No     Immunization questionnaire was  positive for at least one answer.  Dr Zamora is aware.  Patient instructed to remain in clinic for 15 minutes afterwards, and to report any adverse reactions.     Screening performed by Tisha Masters MA on 2/1/2024 at 11:02 AM.

## 2024-02-02 ENCOUNTER — PATIENT OUTREACH (OUTPATIENT)
Dept: CARE COORDINATION | Facility: CLINIC | Age: 29
End: 2024-02-02
Payer: MEDICAID

## 2024-02-02 NOTE — PROGRESS NOTES
Pt referred from [facility] for Food Resources.     Pt is a newly arrived immigrant to [place]. He does not have resources yet for SNAPS. However he is on Medicaid and has medical transportation.     Pt states he can get around to stores by asking his brother inllis for rides, but not always a guaranteed. He would like to get to the food shelves on Sunday. I looked at several places for Sunday open food shelves and there are none. Pt would like delivery option if available.     -[facility] [employer], [facility], and [facility] are all full with delivery schedules until 02/14, will reopen again for several more weeks for delivery but after March the food shelves are closing down the delivery and that might be TBD. I call pt back around 02/14 to see what the schedule delivery looks like. Hopefully we can get him on the list    -I registered him with Mary Thras[employer], food shelf delivery, listed my phone number if any questions. Pt states he would benefit from an  food packet. He is scheduled for food drop off on 02/19/2024 1-3PM time frame.     -I will mail him out a Food Resource packet, has some engagement materials, [employer] (explained to pt, use EBT to help pay, and monthly market voucher but pt will need ride to get to these places) He said he will decide later.    Will followup in a month. Thankyou for the referral.     Shoaib Mcdonnell  Community Advancement Food Resource Navigator  [employer] [facility]   Cell: [phone]

## 2024-02-02 NOTE — PROGRESS NOTES
Clinic Care Coordination Contact  Follow Up Progress Note      Assessment: CCRN assisted patient scheduled ID clinic on 2/29/2024 at 10:05am.     ID clinic   - scheduled on 2/29/24 at 10:05am    ECHO -Pulmonary hypertension   - scheduled on 2/9/2024 at 9am    Adult mental health   - scheduled on 5/9/2024 with FAWAD Aviles CNP    Adult GI and hepatology  - scheduled on 3/20/2024 at 8:45am    Adult Cardiology - Pulmonary hypertension   - clinic will review ECHO then schedule appt  - CHW to follow up     Pulmonology - Pulmonary hypertension   - scheduled on 2/22/2024 at 2:45pm for PFT and 3:45pm with Dr Andrade     Plan: [facility] will provide transportation.

## 2024-02-02 NOTE — TELEPHONE ENCOUNTER
Patient is scheduled with [facility] INFECTIOUS DISEASE on 02/29/2024 @ 10:20 A.M. with Augustine Tello MD.

## 2024-02-07 ENCOUNTER — HOSPITAL ENCOUNTER (OUTPATIENT)
Dept: CARDIOLOGY | Facility: HOSPITAL | Age: 29
Discharge: HOME | End: 2024-02-07
Attending: FAMILY MEDICINE | Admitting: FAMILY MEDICINE
Payer: MEDICAID

## 2024-02-07 DIAGNOSIS — B18.2 CHRONIC HEPATITIS C WITHOUT HEPATIC COMA (H): ICD-10-CM

## 2024-02-07 DIAGNOSIS — I27.20 PULMONARY HYPERTENSION (H): ICD-10-CM

## 2024-02-07 LAB
HCV GENTYP SERPL NAA+PROBE: NORMAL
LVEF ECHO: NORMAL

## 2024-02-07 PROCEDURE — 93306 TTE W/DOPPLER COMPLETE: CPT | Mod: 26 | Performed by: INTERNAL MEDICINE

## 2024-02-07 PROCEDURE — 93306 TTE W/DOPPLER COMPLETE: CPT

## 2024-02-08 ENCOUNTER — PATIENT OUTREACH (OUTPATIENT)
Dept: CARE COORDINATION | Facility: CLINIC | Age: 29
End: 2024-02-08
Payer: MEDICAID

## 2024-02-08 NOTE — PROGRESS NOTES
Clinic Care Coordination Contact  Community Health Worker Follow Up    Care Gaps:   Health Maintenance Due   Topic Date Due    SPIROMETRY  Never done    ADVANCE CARE PLANNING  Never done    COPD ACTION PLAN  Never done    MENINGITIS IMMUNIZATION (1 - Risk 2-dose series) Never done    COVID-19 Vaccine (2 - Pfizer risk series) 02/14/2024     Care Gaps Last addressed on 2/01/2024.    Care Plan:   Care Plan: Cardiology       Problem: Pulmonary hypertension       Goal: Patient will attend his cardiology clinic appointment in the next 6 months.       Start Date: 1/24/2024 Expected End Date: 7/31/2024    This Visit's Progress: 20% Recent Progress: 10%    Note:     Barriers: language barrier, low literacy, noncompliance, and lack of knowledge how to navigate complex health care system  Strengths: motivated to attend appt  Patient expressed understanding of goal: Yes    Action steps to achieve this goal:  1. I will answer my phone when I am contacted to schedule my appointment.  2. I will attend my ECHO appointment as scheduled 2/7/2024 at 9:00am.   3. I will attend my initial cardiology appointment on TBT.  4. I will schedule a follow up appointment with my cardiologist if it is recommended to do so while I am at the clinic.  5. I will follow up with [facility] regarding this goal at each outreach until it is completed.     Per cardiology clinic protocol that the clinic will reach out to patient directly to schedule an appointment.                             Care Plan: Pulmonology       Problem: Pulmonary hypertension       Goal: Patient will attend his pulmonology clinic appointment in the next 6 months.       Start Date: 1/24/2024 Expected End Date: 7/31/2024    This Visit's Progress: 20% Recent Progress: 10%    Note:     Barriers: language barrier, low literacy, noncompliance, and lack of knowledge how to navigate complex health care system  Strengths: motivated to attend appt  Patient expressed understanding of goal:  Yes    Action steps to achieve this goal:  1. I will answer my phone when I am contacted to schedule my appointment.  2. I will attend my pulmonology appointment as scheduled on  2/22/24 at 2:45pm for PFT and 3:45pm with Dr Andrade.  3. I will schedule a follow up appointment with my pulmonologist if it is recommended to do so while I am at the clinic.  4. I will follow up with [facility] regarding this goal at each outreach until it is completed.     Per [facility] services, transportation has been arranged.                           Intervention and Education during outreach:  -Patient completed ECHO appointment but not with cardiologist yet. CHW attempted to assist but was informed by cardiology clinic that per protocol, cardiology clinic will reach out directly to patient to schedule the appointment.   -Patient is scheduled for PFT, pulmonary, ID, liver and others and per patient's ARM services that transportation has been arranged for all upcoming appointments up until May 2024.     CHW Next Outreach: In one month.

## 2024-02-13 DIAGNOSIS — I27.20 PULMONARY HYPERTENSION (H): ICD-10-CM

## 2024-02-13 DIAGNOSIS — B20 HUMAN IMMUNODEFICIENCY VIRUS (HIV) DISEASE (H): ICD-10-CM

## 2024-02-13 DIAGNOSIS — Z02.89 REFUGEE HEALTH EXAMINATION: Primary | ICD-10-CM

## 2024-02-13 DIAGNOSIS — I51.7 RIGHT HEART ENLARGEMENT: ICD-10-CM

## 2024-02-13 DIAGNOSIS — I07.1 MODERATE TRICUSPID REGURGITATION: ICD-10-CM

## 2024-02-13 DIAGNOSIS — I51.7 RIGHT ATRIAL ENLARGEMENT: ICD-10-CM

## 2024-02-13 DIAGNOSIS — R93.1 ABNORMAL ECHOCARDIOGRAM: ICD-10-CM

## 2024-02-13 DIAGNOSIS — J43.8 OTHER EMPHYSEMA (H): ICD-10-CM

## 2024-02-15 ENCOUNTER — TELEPHONE (OUTPATIENT)
Dept: GASTROENTEROLOGY | Facility: CLINIC | Age: 29
End: 2024-02-15
Payer: MEDICAID

## 2024-02-21 ENCOUNTER — PATIENT OUTREACH (OUTPATIENT)
Dept: NURSING | Facility: CLINIC | Age: 29
End: 2024-02-21
Payer: MEDICAID

## 2024-02-21 NOTE — PROGRESS NOTES
Clinic Care Coordination Contact  Follow Up Progress Note      Assessment: CCRN spoke with patient today via phone. Reviewed goals with patient and barriers to access healthcare and community resources. Confirmed his 2 older children are in school now.     ECHO  - completed on 2/9/2024    Cardiology  - scheduled on 4/1/2024    GI and hepatology  - rescheduled to 5/1/2024 with lab    Care Gaps:    Health Maintenance Due   Topic Date Due    SPIROMETRY  Never done    ADVANCE CARE PLANNING  Never done    COPD ACTION PLAN  Never done    MENINGITIS IMMUNIZATION (1 - Risk 2-dose series) Never done    COVID-19 Vaccine (2 - Pfizer risk series) 02/14/2024       Postponed to next [facility] outreach.      Care Plans  Care Plan: Cardiology       Problem: Pulmonary hypertension       Goal: Patient will attend his cardiology clinic appointment in the next 6 months.       Start Date: 1/24/2024 Expected End Date: 7/31/2024    Recent Progress: 20%    Note:     Barriers: language barrier, low literacy, noncompliance, and lack of knowledge how to navigate complex health care system  Strengths: motivated to attend appt  Patient expressed understanding of goal: Yes    Action steps to achieve this goal:  1. I will answer my phone when I am contacted to schedule my appointment.  2. I will attend my ECHO appointment as scheduled 2/7/2024 at 9:00am. Completed.   3. I will attend my initial cardiology appointment on 4/1/2024 at 10:15am with Andreas Johns MD   4. I will schedule a follow up appointment with my cardiologist if it is recommended to do so while I am at the clinic.  5. I will follow up with CCC regarding this goal at each outreach until it is completed.     Per cardiology clinic protocol that the clinic will reach out to patient directly to schedule an appointment.                             Care Plan: Pulmonology       Problem: Pulmonary hypertension       Goal: Patient will attend his pulmonology clinic appointment in the  next 6 months.       Start Date: 1/24/2024 Expected End Date: 7/31/2024    This Visit's Progress: 20% Recent Progress: 10%    Note:     Barriers: language barrier, low literacy, noncompliance, and lack of knowledge how to navigate complex health care system  Strengths: motivated to attend appt  Patient expressed understanding of goal: Yes    Action steps to achieve this goal:  1. I will answer my phone when I am contacted to schedule my appointment.  2. I will attend my pulmonology appointment as scheduled on  2/22/24 at 2:45pm for PFT and 3:45pm with Dr Andrade.  3. I will schedule a follow up appointment with my pulmonologist if it is recommended to do so while I am at the clinic.  4. I will follow up with CCC regarding this goal at each outreach until it is completed.     Per [facility] services, transportation has been arranged.                           Outreach Frequency: 6 weeks, more frequently as needed    Plan: Patient will attend his pulmonology appt on 2/22. Jl will provider ride.

## 2024-02-29 ENCOUNTER — OFFICE VISIT (OUTPATIENT)
Dept: INFECTIOUS DISEASES | Facility: CLINIC | Age: 29
End: 2024-02-29
Attending: FAMILY MEDICINE
Payer: MEDICAID

## 2024-02-29 ENCOUNTER — PATIENT OUTREACH (OUTPATIENT)
Dept: NURSING | Facility: CLINIC | Age: 29
End: 2024-02-29
Payer: MEDICAID

## 2024-02-29 ENCOUNTER — LAB (OUTPATIENT)
Dept: LAB | Facility: CLINIC | Age: 29
End: 2024-02-29
Payer: MEDICAID

## 2024-02-29 ENCOUNTER — HOSPITAL ENCOUNTER (OUTPATIENT)
Dept: GENERAL RADIOLOGY | Facility: HOSPITAL | Age: 29
Discharge: HOME | End: 2024-02-29
Attending: INTERNAL MEDICINE | Admitting: INTERNAL MEDICINE
Payer: MEDICAID

## 2024-02-29 VITALS
DIASTOLIC BLOOD PRESSURE: 79 MMHG | WEIGHT: 141.6 LBS | BODY MASS INDEX: 23.45 KG/M2 | HEART RATE: 80 BPM | TEMPERATURE: 98.1 F | SYSTOLIC BLOOD PRESSURE: 111 MMHG | OXYGEN SATURATION: 100 %

## 2024-02-29 DIAGNOSIS — B20 HUMAN IMMUNODEFICIENCY VIRUS (HIV) DISEASE (H): ICD-10-CM

## 2024-02-29 DIAGNOSIS — B20 HUMAN IMMUNODEFICIENCY VIRUS (HIV) DISEASE (H): Primary | ICD-10-CM

## 2024-02-29 DIAGNOSIS — R06.09 DOE (DYSPNEA ON EXERTION): ICD-10-CM

## 2024-02-29 DIAGNOSIS — Z21 ASYMPTOMATIC HUMAN IMMUNODEFICIENCY VIRUS (HIV) INFECTION STATUS (H): ICD-10-CM

## 2024-02-29 DIAGNOSIS — I27.20 PULMONARY HTN (H): ICD-10-CM

## 2024-02-29 DIAGNOSIS — L03.211 CELLULITIS OF FACE: ICD-10-CM

## 2024-02-29 LAB
ALBUMIN SERPL BCG-MCNC: 3.7 G/DL (ref 3.5–5.2)
ALBUMIN UR-MCNC: 30 MG/DL
ALP SERPL-CCNC: 211 U/L (ref 40–150)
ALT SERPL W P-5'-P-CCNC: 63 U/L (ref 0–70)
ANION GAP SERPL CALCULATED.3IONS-SCNC: 9 MMOL/L (ref 7–15)
APPEARANCE UR: CLEAR
AST SERPL W P-5'-P-CCNC: 97 U/L (ref 0–45)
BACTERIA #/AREA URNS HPF: ABNORMAL /HPF
BILIRUB DIRECT SERPL-MCNC: 0.93 MG/DL (ref 0–0.3)
BILIRUB SERPL-MCNC: 1.7 MG/DL
BILIRUB UR QL STRIP: ABNORMAL
BUN SERPL-MCNC: 10.9 MG/DL (ref 6–20)
CALCIUM SERPL-MCNC: 9 MG/DL (ref 8.6–10)
CD3 CELLS # BLD: 1414 CELLS/UL (ref 603–2990)
CD3 CELLS NFR BLD: 79 % (ref 49–84)
CD3+CD4+ CELLS # BLD: 589 CELLS/UL (ref 441–2156)
CD3+CD4+ CELLS NFR BLD: 33 % (ref 28–63)
CD3+CD4+ CELLS/CD3+CD8+ CLL BLD: 0.77 % (ref 1.4–2.6)
CD3+CD8+ CELLS # BLD: 760 CELLS/UL (ref 125–1312)
CD3+CD8+ CELLS NFR BLD: 43 % (ref 10–40)
CHLORIDE SERPL-SCNC: 103 MMOL/L (ref 98–107)
CHOLEST SERPL-MCNC: 110 MG/DL
COLOR UR AUTO: YELLOW
CREAT SERPL-MCNC: 0.82 MG/DL (ref 0.67–1.17)
CRP SERPL-MCNC: 7.7 MG/L
DEPRECATED HCO3 PLAS-SCNC: 24 MMOL/L (ref 22–29)
DRVVT SCREEN RATIO: 0.76
EGFRCR SERPLBLD CKD-EPI 2021: >90 ML/MIN/1.73M2
ERYTHROCYTE [DISTWIDTH] IN BLOOD BY AUTOMATED COUNT: 13.1 % (ref 10–15)
FASTING STATUS PATIENT QL REPORTED: NO
GLUCOSE SERPL-MCNC: 88 MG/DL (ref 70–99)
GLUCOSE UR STRIP-MCNC: NEGATIVE MG/DL
HBV CORE AB SERPL QL IA: REACTIVE
HBV SURFACE AB SERPL IA-ACNC: 41 M[IU]/ML
HBV SURFACE AB SERPL IA-ACNC: REACTIVE M[IU]/ML
HBV SURFACE AG SERPL QL IA: NONREACTIVE
HCT VFR BLD AUTO: 43.8 % (ref 40–53)
HCV AB SERPL QL IA: REACTIVE
HDLC SERPL-MCNC: 20 MG/DL
HGB BLD-MCNC: 14.7 G/DL (ref 13.3–17.7)
HGB UR QL STRIP: ABNORMAL
HIV 1+2 AB+HIV1 P24 AG SERPL QL IA: REACTIVE
INR PPP: 1.29 (ref 0.85–1.15)
IRON BINDING CAPACITY (ROCHE): 332 UG/DL (ref 240–430)
IRON SATN MFR SERPL: 30 % (ref 15–46)
IRON SERPL-MCNC: 101 UG/DL (ref 61–157)
KETONES UR STRIP-MCNC: NEGATIVE MG/DL
LA PPP-IMP: NEGATIVE
LDLC SERPL CALC-MCNC: 78 MG/DL
LEUKOCYTE ESTERASE UR QL STRIP: NEGATIVE
LUPUS INTERPRETATION: ABNORMAL
MCH RBC QN AUTO: 33.6 PG (ref 26.5–33)
MCHC RBC AUTO-ENTMCNC: 33.6 G/DL (ref 31.5–36.5)
MCV RBC AUTO: 100 FL (ref 78–100)
MUCOUS THREADS #/AREA URNS LPF: PRESENT /LPF
NITRATE UR QL: NEGATIVE
NONHDLC SERPL-MCNC: 90 MG/DL
PH UR STRIP: 8 [PH] (ref 5–8)
PLATELET # BLD AUTO: 144 10E3/UL (ref 150–450)
PLATELET NEUTRALIZATION: -2 SECONDS
POTASSIUM SERPL-SCNC: 4 MMOL/L (ref 3.4–5.3)
PROT SERPL-MCNC: 8.9 G/DL (ref 6.4–8.3)
PTT 1:2 MIX: 42 SECONDS (ref 31–45)
PTT RATIO: 1.49
RBC # BLD AUTO: 4.38 10E6/UL (ref 4.4–5.9)
RBC #/AREA URNS AUTO: ABNORMAL /HPF
RHEUMATOID FACT SERPL-ACNC: <10 IU/ML
SODIUM SERPL-SCNC: 136 MMOL/L (ref 135–145)
SP GR UR STRIP: 1.02 (ref 1–1.03)
SQUAMOUS #/AREA URNS AUTO: ABNORMAL /LPF
T CELL COMMENT: ABNORMAL
THROMBIN TIME: 17.2 SECONDS (ref 13–19)
TRIGL SERPL-MCNC: 59 MG/DL
TSH SERPL DL<=0.005 MIU/L-ACNC: 1.17 UIU/ML (ref 0.3–4.2)
UROBILINOGEN UR STRIP-ACNC: 2 E.U./DL
WBC # BLD AUTO: 5.6 10E3/UL (ref 4–11)
WBC #/AREA URNS AUTO: ABNORMAL /HPF

## 2024-02-29 PROCEDURE — 83880 ASSAY OF NATRIURETIC PEPTIDE: CPT

## 2024-02-29 PROCEDURE — 85730 THROMBOPLASTIN TIME PARTIAL: CPT

## 2024-02-29 PROCEDURE — 87077 CULTURE AEROBIC IDENTIFY: CPT | Performed by: INTERNAL MEDICINE

## 2024-02-29 PROCEDURE — 83550 IRON BINDING TEST: CPT

## 2024-02-29 PROCEDURE — 83540 ASSAY OF IRON: CPT

## 2024-02-29 PROCEDURE — 87186 SC STD MICRODIL/AGAR DIL: CPT | Performed by: INTERNAL MEDICINE

## 2024-02-29 PROCEDURE — 87070 CULTURE OTHR SPECIMN AEROBIC: CPT | Performed by: INTERNAL MEDICINE

## 2024-02-29 PROCEDURE — 82248 BILIRUBIN DIRECT: CPT

## 2024-02-29 PROCEDURE — 86038 ANTINUCLEAR ANTIBODIES: CPT

## 2024-02-29 PROCEDURE — 86359 T CELLS TOTAL COUNT: CPT

## 2024-02-29 PROCEDURE — 71046 X-RAY EXAM CHEST 2 VIEWS: CPT

## 2024-02-29 PROCEDURE — 85390 FIBRINOLYSINS SCREEN I&R: CPT | Performed by: PATHOLOGY

## 2024-02-29 PROCEDURE — 81001 URINALYSIS AUTO W/SCOPE: CPT

## 2024-02-29 PROCEDURE — 80061 LIPID PANEL: CPT

## 2024-02-29 PROCEDURE — 87340 HEPATITIS B SURFACE AG IA: CPT

## 2024-02-29 PROCEDURE — 87205 SMEAR GRAM STAIN: CPT | Performed by: INTERNAL MEDICINE

## 2024-02-29 PROCEDURE — 80053 COMPREHEN METABOLIC PANEL: CPT

## 2024-02-29 PROCEDURE — 87536 HIV-1 QUANT&REVRSE TRNSCRPJ: CPT

## 2024-02-29 PROCEDURE — 86360 T CELL ABSOLUTE COUNT/RATIO: CPT

## 2024-02-29 PROCEDURE — 85610 PROTHROMBIN TIME: CPT

## 2024-02-29 PROCEDURE — 86706 HEP B SURFACE ANTIBODY: CPT

## 2024-02-29 PROCEDURE — 85613 RUSSELL VIPER VENOM DILUTED: CPT

## 2024-02-29 PROCEDURE — 86140 C-REACTIVE PROTEIN: CPT

## 2024-02-29 PROCEDURE — 84238 ASSAY NONENDOCRINE RECEPTOR: CPT | Mod: 90

## 2024-02-29 PROCEDURE — 87591 N.GONORRHOEAE DNA AMP PROB: CPT

## 2024-02-29 PROCEDURE — G2211 COMPLEX E/M VISIT ADD ON: HCPCS | Performed by: INTERNAL MEDICINE

## 2024-02-29 PROCEDURE — 87491 CHLMYD TRACH DNA AMP PROBE: CPT

## 2024-02-29 PROCEDURE — 99000 SPECIMEN HANDLING OFFICE-LAB: CPT

## 2024-02-29 PROCEDURE — 86702 HIV-2 ANTIBODY: CPT | Mod: 59

## 2024-02-29 PROCEDURE — 87389 HIV-1 AG W/HIV-1&-2 AB AG IA: CPT

## 2024-02-29 PROCEDURE — 86431 RHEUMATOID FACTOR QUANT: CPT

## 2024-02-29 PROCEDURE — 36415 COLL VENOUS BLD VENIPUNCTURE: CPT

## 2024-02-29 PROCEDURE — 83529 ASAY OF INTERLEUKIN-6 (IL-6): CPT

## 2024-02-29 PROCEDURE — 85027 COMPLETE CBC AUTOMATED: CPT

## 2024-02-29 PROCEDURE — 84443 ASSAY THYROID STIM HORMONE: CPT

## 2024-02-29 PROCEDURE — 99205 OFFICE O/P NEW HI 60 MIN: CPT | Performed by: INTERNAL MEDICINE

## 2024-02-29 PROCEDURE — 86704 HEP B CORE ANTIBODY TOTAL: CPT

## 2024-02-29 PROCEDURE — 86803 HEPATITIS C AB TEST: CPT

## 2024-02-29 PROCEDURE — 86701 HIV-1ANTIBODY: CPT | Mod: 59

## 2024-02-29 RX ORDER — TENOFOVIR DISOPROXIL FUMARATE 300 MG/1
300 TABLET, FILM COATED ORAL DAILY
COMMUNITY
End: 2024-04-04

## 2024-02-29 RX ORDER — SILDENAFIL 100 MG/1
100 TABLET, FILM COATED ORAL DAILY PRN
COMMUNITY
End: 2024-04-15 | Stop reason: DRUGHIGH

## 2024-02-29 RX ORDER — LAMIVUDINE 150 MG/1
300 TABLET, FILM COATED ORAL DAILY
COMMUNITY
End: 2024-04-04

## 2024-02-29 RX ORDER — SULFAMETHOXAZOLE/TRIMETHOPRIM 800-160 MG
1 TABLET ORAL 2 TIMES DAILY
Qty: 20 TABLET | Refills: 0 | Status: SHIPPED | OUTPATIENT
Start: 2024-02-29 | End: 2024-03-04

## 2024-02-29 NOTE — PROGRESS NOTES
Clinic Care Coordination Contact  Follow Up Progress Note      Assessment: JENNI spoke patient today and reminded him of his ID clinic appt today at 10:05am. Jl will provide ride. Instructed patient to bring all his meds to today appt. Patient verbalized understanding.     Plan: Patient will attend his ID clinic appt today.

## 2024-02-29 NOTE — LETTER
2/29/2024         RE: Carolina Torres  [address]  [place] [address]        Dear Colleague,    Thank you for referring your patient, Carolina Torres, to the [facility]. Please see a copy of my visit note below.    [facility] - [place]    Date: 02/29/2024   Patient Name: Craolina Torres   YOB: 1995  MRN: [id number]      ASSESSMENT:  28-year-old man referred to ID clinic for HIV management.    HIV infection.  Diagnosed in [place] within the past year.  Outside records show a CD4 count of 788 in July 2023.  Duration of infection unclear.  Has been on combination pill of dolutegravir, lamivudine, tenofovir DF for about a year.  No viral load information available.  No pretreatment genotype available.  Patient reports acquisition from sharing needles.  Unknown history of opportunistic infections.  Reports okay adherence, missing a few doses every month.  Wife and 1 daughter are also HIV positive and on treatment.  TB class B1 from immigration paperwork.  Per [facility] class B1 indicates panel physician found evidence of extrapulmonary or sputum AFB smear and culture negative pulmonary TB disease.  Includes old healed TB, previously treated TB, and persons living with HIV.  Patient denies any previous diagnosis of TB.  Per immigration records the patient has cardiomegaly on chest x-ray.  He had sputum AFB and cultures collected on 3 consecutive days in September 2023.  Smear and cultures were negative x 3.  He denies fevers, night sweats, or chronic cough.  QuantiFERON testing January 2024 is negative.  I suspect class B1 designation was related to HIV diagnosis.  Hepatitis C.  Presumed from IV drug use.  Genotype 3A.  Viral load 8,590,000.  Has referral placed to GI for May.  Hepatitis B surface antigen negative with immunity from previous infection (positive core and surface antibodies).  Also has documentation of vaccination on 4/5/2013, 6/6/2013, and 7/17/2023  hepatitis A vaccine x 1 on 2/1/2024.  Pulmonary hypertension.  Diagnosed prior to arrival.  Outpatient chest x-ray with cardiomegaly.  Echo repeated recently.  On sildenafil.  Chronic tobacco use  History of polysubstance abuse, including IV drug use.  Reports sobriety  Nose cellulitis.  Pimple developed about a week ago, currently draining purulent material.    PLAN:  -Continue current HIV regimen.  Patient has about 2 months left of current supply.  Currently taking combination pill of dolutegravir, lamivudine, tenofovir DF.  This combination is not available in the [place].  -Will order viral load today to ensure suppression on the current regimen  -If viral load is low/undetectable, will likely start Dovato (dolutegravir with lamivudine)  -If viral load is elevated will order genotype and stress adherence to medications as he does miss doses occasionally.  -Additional baseline labs: Gonorrhea/chlamydia urine probe, urinalysis, CD4 count  -Baseline chest x-ray  -No additional workup for tuberculosis at this time  -Bactrim double strength twice daily x 10 days  -Swab culture sent from nose  -Discussed coinfection with HIV and hepatitis C and the importance of making it to his GI appointment  -Discussed management of HIV and importance of long-term adherence      Return to clinic in 1 month.    Augustine Tello MD  [facility]   Clinic phone: [phone]   Clinic fax: [phone]     ______________________________________________________________________    HISTORY OF PRESENT ILLNESS:   Patient is seen with a Sandhya  over the phone.    July Brian is a 28 year old man who is referred for evaluation of HIV.  Patient recently immigrated from [place] in January of this year.  He was recently evaluated in primary care clinic and referred to ID due to a diagnosis of HIV.  He has extensive immigration records that are scanned and were reviewed today.  The patient has a known  history of polysubstance abuse, including IV drug use.  He believes this is how he was infected with HIV.  Timeline of infection is unclear.  He denies any significant opportunistic infections in the past.  He does tell me that he was diagnosed over the past year and started on treatment.  He is on triple drug therapy.  He takes this regularly, but does miss occasional doses.  He is currently living with his wife and 3 children.  2 of his children are school-age and 1 is 16 months old.  His wife and middle daughter are both diagnosed with HIV and currently on treatment.  Today he has no acute complaints.  He does not have any questions regarding HIV diagnosis.  He knows that he needs to stay on his medications.  He does have a lesion on his nose that has been present for about a week.  He says that this started out as a pimple and is now draining.  He is not taking any medicine for this.    Outside records were reviewed which show workup for tuberculosis with sputum AFB smear and culture x 3 in September 2023.  Smear and cultures were negative.  He has a recent chest x-ray which indicates cardiomegaly without any other report of cavitary lesions or infiltrates.  He is known to be positive for hepatitis C.  HIV test was positive on 7/17/2023 (unknown if there were prior positive test).  Additional labs in July showed negative hepatitis B surface antigen, negative treponemal antibody.  An ultrasound of the liver showed enlarged liver smooth surface with normal parenchymal echogenicity without gross focal lesion.  Enlarged intrahepatic IVC and hepatic vein.  No intrahepatic bile duct dilation is observed.  The visualized proximal CBD is measured about 0.3 cm.  Gallbladder is well-distended without gross stone.  Diffuse gallbladder wall thickening is noted.  The visualized part of pancreas appeared normal.  Spleen is measured about 11 cm in length.  Both kidneys are of normal size and echotexture.  Small ascites is  noted.    Additional labs done [place] moved to [place] include negative QuantiFERON test, negative treponemal antibody, negative hepatitis B surface antigen, positive hepatitis B core and surface antibody, positive hepatitis C antibody with elevated viral load, genotype 3A.  Normal kidney and liver testing (mildly elevated AST)      Interval History:  Not applicable      Review of Systems:  Ten systems reviewed and negative except for what is noted in the HPI       Past Medical History:  Past Medical History:   Diagnosis Date     Alcohol dependence (H)      Cannabis abuse      Current smoker      Hepatitis C      Human immunodeficiency virus (HIV) disease (H) 2019    upon arrival is on Dolutegravir/Lamivudine/Tenovir 50/300/300  one daily; CD4 788 (7/23)     Opioid use disorder, moderate, in sustained remission (H)     diagnosis [place] in [place] 1/[place]24     Other ascites      Pulmonary hypertension (H)     severely impaired right ventricular systolic function     Reaction to QuantiFERON-TB test (QFT) without active tuberculosis     class B1 [place]     Tattoos     right hand, left leg       Past Surgical History:  No past surgical history on file.    Allergies:  No Known Allergies    Medications:    Current Outpatient Medications:      dolutegravir (TIVICAY) 50 MG tablet, Take 50 mg by mouth daily Combination pill: Dolutegravir-Lamivudine-Tenofovir DF, Disp: , Rfl:      lamiVUDine (EPIVIR) 150 MG tablet, Take 300 mg by mouth daily Combination pill: Dolutegravir-Lamivudine-Tenofovir DF, Disp: , Rfl:      sildenafil (VIAGRA) 100 MG tablet, Take 100 mg by mouth daily as needed Takes 1/4 of a tab 3 x's daily, Disp: , Rfl:      sulfamethoxazole-trimethoprim (BACTRIM DS) 800-160 MG tablet, Take 1 tablet by mouth 2 times daily for 10 days, Disp: 20 tablet, Rfl: 0     tenofovir (VIREAD) 300 MG tablet, Take 300 mg by mouth daily Combination pill: Dolutegravir-Lamivudine-Tenofovir DF, Disp: , Rfl:      Social History:  Social History     Socioeconomic History     Marital status:      Spouse name: Not on file     Number of children: Not on file     Years of education: Not on file     Highest education level: Not on file   Occupational History     Not on file   Tobacco Use     Smoking status: Every Day     Types: Cigarettes     Passive exposure: Current     Smokeless tobacco: Current     Types: Chew     Tobacco comments:     Betel nut with tobacco    Vaping Use     Vaping Use: Never used   Substance and Sexual Activity     Alcohol use: Not on file     Drug use: Not on file     Sexual activity: Not on file   Other Topics Concern     Not on file   Social History Narrative    As of 2024        [place] in [place]: 2024        Marrital status:  to  Henrik Griffin ( 1997) as of         Living situation: lives in an apartment with spouse and 3 kids            Languages spoken: Jac [place]            Past employment: farmer            Place of birth: [place] [address] [facility]    Years in a refugee [place]: 27 (all life until [place]        Family [place]:     Family [place]:         Education previous [place] the [place]: [place] 2    Education started in the [place]:none            Evangelical: not asked        A#: [phone]         Social Determinants of Health     Financial Resource Strain: Low Risk  (2024)    Financial Resource Strain      Within the past 12 months, have you or your family members you live with been unable to get utilities (heat, electricity) when it was really needed?: No   Food Insecurity: Low Risk  (2024)    Food Insecurity      Within the past 12 months, did you worry that your food would run out before you got money to buy more?: No      Within the past 12 months, did the food you bought just not last and you didn t have money to get more?: No   Transportation Needs: Low Risk  (2024)    Transportation Needs      Within the past 12 months, has lack of  transportation kept you from medical appointments, getting your medicines, non-medical meetings or appointments, work, or from getting things that you need?: No   Physical Activity: Not on file   Stress: Not on file   Social Connections: Not on file   Interpersonal Safety: Low Risk  (1/24/2024)    Interpersonal Safety      Do you feel physically and emotionally safe where you currently live?: Yes      Within the past 12 months, have you been hit, slapped, kicked or otherwise physically hurt by someone?: No      Within the past 12 months, have you been humiliated or emotionally abused in other ways by your partner or ex-partner?: No   Housing Stability: Low Risk  (1/24/2024)    Housing Stability      Do you have housing? : Yes      Are you worried about losing your housing?: No        Family History:  See HPI above        PHYSICAL EXAM:    /79 (BP Location: Right arm, Patient Position: Sitting, Cuff Size: Adult Regular)   Pulse 80   Temp 98.1  F (36.7  C) (Oral)   Wt 64.2 kg (141 lb 9.6 oz)   SpO2 100%   BMI 23.45 kg/m      GENERAL:  well-developed, well-nourished, in no acute distress.   HENT:  Head is normocephalic, atraumatic. Oropharynx is moist without exudates or ulcers.  No thrush.  Terrible dentition.  Erythema over the nose with purulent ulceration  EYES:  Eyes have anicteric sclerae without conjunctival injection or stigmata of endocarditis.    NECK:  Supple. No cervical lymphadenopathy  Lymph: No axillary or inguinal lymphadenopathy  LUNGS:  Clear to auscultation.  CARDIOVASCULAR:  Regular rate and rhythm with no murmurs, gallops or rubs.  ABDOMEN:  Normal bowel sounds, soft, nontender.  Liver edge felt 1-2 inches below the costal margin  MUSCULOSKELETAL: Extremities warm and without edema. No joint swelling.  SKIN:  No acute rashes. No stigmata of endocarditis.  NEUROLOGIC: Grossly nonfocal. Normal gait and station.  5 out of 5 strength in the upper and lower extremities        Pertinent  labs:    Lab Results   Component Value Date     01/25/2024    POTASSIUM 3.7 01/25/2024    CHLORIDE 107 01/25/2024    CO2 21 (L) 01/25/2024    BUN 8.8 01/25/2024    CR 0.95 01/25/2024    GLC 89 01/25/2024       Lab Results   Component Value Date    WBC 5.4 01/25/2024    HGB 12.4 (L) 01/25/2024    HCT 37.8 (L) 01/25/2024    PLT 99 (L) 01/25/2024     (H) 01/25/2024    RDW 13.7 01/25/2024        Lab Results   Component Value Date    BILITOTAL 1.0 01/25/2024    AST 66 (H) 01/25/2024    ALT 51 01/25/2024    PROTTOTAL 8.1 01/25/2024    ALBUMIN 3.7 01/25/2024    ALKPHOS 121 01/25/2024            MICROBIOLOGY DATA:  None    RADIOLOGY:  None    Attestation:  Total time preparing to see this patient, face-to-face time, and coordinating care time on the same calendar date: 69 minutes.  Face-face time: 38 minutes.  Over 50% of face-to-face time was spent in counseling/coordination of care.     The longitudinal plan of care for the diagnosis(es)/condition(s) as documented were addressed during this visit. Due to the added complexity in care, I will continue to support July in the subsequent management and with ongoing continuity of care.     Again, thank you for allowing me to participate in the care of your patient.        Sincerely,        Augustine Tello MD

## 2024-02-29 NOTE — PROGRESS NOTES
[facility] INFECTIOUS DISEASE CLINIC [facility]    Date: 02/29/2024   Patient Name: Carolina Torres   YOB: 1995  MRN: [id number]      ASSESSMENT:  28-year-old man referred to ID clinic for HIV management.    HIV infection.  Diagnosed in [facility] within the past year.  Outside records show a CD4 count of 788 in July 2023.  Duration of infection unclear.  Has been on combination pill of dolutegravir, lamivudine, tenofovir DF for about a year.  No viral load information available.  No pretreatment genotype available.  Patient reports acquisition from sharing needles.  Unknown history of opportunistic infections.  Reports okay adherence, missing a few doses every month.  Wife and 1 daughter are also HIV positive and on treatment.  TB class B1 from immigration paperwork.  Per [facility] class B1 indicates panel physician found evidence of extrapulmonary or sputum AFB smear and culture negative pulmonary TB disease.  Includes old healed TB, previously treated TB, and persons living with HIV.  Patient denies any previous diagnosis of TB.  Per immigration records the patient has cardiomegaly on chest x-ray.  He had sputum AFB and cultures collected on 3 consecutive days in September 2023.  Smear and cultures were negative x 3.  He denies fevers, night sweats, or chronic cough.  QuantiFERON testing January 2024 is negative.  I suspect class B1 designation was related to HIV diagnosis.  Hepatitis C.  Presumed from IV drug use.  Genotype 3A.  Viral load 8,590,000.  Has referral placed to  for May.  Hepatitis B surface antigen negative with immunity from previous infection (positive core and surface antibodies).  Also has documentation of vaccination on 4/5/2013, 6/6/2013, and 7/17/2023 hepatitis A vaccine x 1 on 2/1/2024.  Pulmonary hypertension.  Diagnosed prior to arrival.  Outpatient chest x-ray with cardiomegaly.  Echo repeated recently.  On sildenafil.  Chronic tobacco use  History of polysubstance abuse, including IV drug  use.  Reports sobriety  Nose cellulitis.  Pimple developed about a week ago, currently draining purulent material.    PLAN:  -Continue current HIV regimen.  Patient has about 2 months left of current supply.  Currently taking combination pill of dolutegravir, lamivudine, tenofovir DF.  This combination is not available in the [place].  -Will order viral load today to ensure suppression on the current regimen  -If viral load is low/undetectable, will likely start Dovato (dolutegravir with lamivudine)  -If viral load is elevated will order genotype and stress adherence to medications as he does miss doses occasionally.  -Additional baseline labs: Gonorrhea/chlamydia urine probe, urinalysis, CD4 count  -Baseline chest x-ray  -No additional workup for tuberculosis at this time  -Bactrim double strength twice daily x 10 days  -Swab culture sent from nose  -Discussed coinfection with HIV and hepatitis C and the importance of making it to his GI appointment  -Discussed management of HIV and importance of long-term adherence      Return to clinic in 1 month.    Augustine Tello MD  [facility]   Clinic phone: [phone]   Clinic fax: [phone]     ______________________________________________________________________    HISTORY OF PRESENT ILLNESS:   Patient is seen with a Sandhya  over the phone.    Carolina Torres is a 28 year old man who is referred for evaluation of HIV.  Patient recently immigrated from [facility] in January of this year.  He was recently evaluated in primary care clinic and referred to ID due to a diagnosis of HIV.  He has extensive immigration records that are scanned and were reviewed today.  The patient has a known history of polysubstance abuse, including IV drug use.  He believes this is how he was infected with HIV.  Timeline of infection is unclear.  He denies any significant opportunistic infections in the past.  He does tell me that he was diagnosed over  the past year and started on treatment.  He is on triple drug therapy.  He takes this regularly, but does miss occasional doses.  He is currently living with his wife and 3 children.  2 of his children are school-age and 1 is 16 months old.  His wife and middle daughter are both diagnosed with HIV and currently on treatment.  Today he has no acute complaints.  He does not have any questions regarding HIV diagnosis.  He knows that he needs to stay on his medications.  He does have a lesion on his nose that has been present for about a week.  He says that this started out as a pimple and is now draining.  He is not taking any medicine for this.    Outside records were reviewed which show workup for tuberculosis with sputum AFB smear and culture x 3 in September 2023.  Smear and cultures were negative.  He has a recent chest x-ray which indicates cardiomegaly without any other report of cavitary lesions or infiltrates.  He is known to be positive for hepatitis C.  HIV test was positive on 7/17/2023 (unknown if there were prior positive test).  Additional labs in July showed negative hepatitis B surface antigen, negative treponemal antibody.  An ultrasound of the liver showed enlarged liver smooth surface with normal parenchymal echogenicity without gross focal lesion.  Enlarged intrahepatic IVC and hepatic vein.  No intrahepatic bile duct dilation is observed.  The visualized proximal CBD is measured about 0.3 cm.  Gallbladder is well-distended without gross stone.  Diffuse gallbladder wall thickening is noted.  The visualized part of pancreas appeared normal.  Spleen is measured about 11 cm in length.  Both kidneys are of normal size and echotexture.  Small ascites is noted.    Additional labs done following moved to [place] include negative QuantiFERON test, negative treponemal antibody, negative hepatitis B surface antigen, positive hepatitis B core and surface antibody, positive hepatitis C antibody with  elevated viral load, genotype 3A.  Normal kidney and liver testing (mildly elevated AST)      Interval History:  Not applicable      Review of Systems:  Ten systems reviewed and negative except for what is noted in the HPI       Past Medical History:  Past Medical History:   Diagnosis Date    Alcohol dependence (H)     Cannabis abuse     Current smoker     Hepatitis C     Human immunodeficiency virus (HIV) disease (H) 2019    upon arrival is on Dolutegravir/Lamivudine/Tenovir 50/300/300  one daily; CD4 788 (7/23)    Opioid use disorder, moderate, in sustained remission (H)     diagnosis [place] in [place] 1/[place]24    Other ascites     Pulmonary hypertension (H)     severely impaired right ventricular systolic function    Reaction to QuantiFERON-TB test (QFT) without active tuberculosis     class B1 [place] to [place]    Tattoos     right hand, left leg       Past Surgical History:  No past surgical history on file.    Allergies:  No Known Allergies    Medications:    Current Outpatient Medications:     dolutegravir (TIVICAY) 50 MG tablet, Take 50 mg by mouth daily Combination pill: Dolutegravir-Lamivudine-Tenofovir DF, Disp: , Rfl:     lamiVUDine (EPIVIR) 150 MG tablet, Take 300 mg by mouth daily Combination pill: Dolutegravir-Lamivudine-Tenofovir DF, Disp: , Rfl:     sildenafil (VIAGRA) 100 MG tablet, Take 100 mg by mouth daily as needed Takes 1/4 of a tab 3 x's daily, Disp: , Rfl:     sulfamethoxazole-trimethoprim (BACTRIM DS) 800-160 MG tablet, Take 1 tablet by mouth 2 times daily for 10 days, Disp: 20 tablet, Rfl: 0    tenofovir (VIREAD) 300 MG tablet, Take 300 mg by mouth daily Combination pill: Dolutegravir-Lamivudine-Tenofovir DF, Disp: , Rfl:     Social History:  Social History     Socioeconomic History    Marital status:      Spouse name: Not on file    Number of children: Not on file    Years of education: Not on file    Highest education level: Not on file   Occupational History    Not on file    Tobacco Use    Smoking status: Every Day     Types: Cigarettes     Passive exposure: Current    Smokeless tobacco: Current     Types: Chew    Tobacco comments:     Betel nut with tobacco    Vaping Use    Vaping Use: Never used   Substance and Sexual Activity    Alcohol use: Not on file    Drug use: Not on file    Sexual activity: Not on file   Other Topics Concern    Not on file   Social History Narrative    As of 2024        [place] 20[place]        Marrital status:  to  Henrik Elias ( 1997) as 2012        Living situation: lives in an apartment with spouse and 3 kids            Languages spoken: Jac Arthur            Past employment: farmer            Place of birth: [place], [place] [facility]    Years in a refugee [place]: 27 (all life until [place] to [place]        Family not in [place]:     Family in [place]:         Education previous [place] the [place]: [place] 2    Education started in the [place]:none            Yarsanism: not asked        A#: [phone]         Social Determinants of Health     Financial Resource Strain: Low Risk  (2024)    Financial Resource Strain     Within the past 12 months, have you or your family members you live with been unable to get utilities (heat, electricity) when it was really needed?: No   Food Insecurity: Low Risk  (2024)    Food Insecurity     Within the past 12 months, did you worry that your food would run out before you got money to buy more?: No     Within the past 12 months, did the food you bought just not last and you didn t have money to get more?: No   Transportation Needs: Low Risk  (2024)    Transportation Needs     Within the past 12 months, has lack of transportation kept you from medical appointments, getting your medicines, non-medical meetings or appointments, work, or from getting things that you need?: No   Physical Activity: Not on file   Stress: Not on file   Social Connections: Not on file   Interpersonal Safety: Low Risk   (1/24/2024)    Interpersonal Safety     Do you feel physically and emotionally safe where you currently live?: Yes     Within the past 12 months, have you been hit, slapped, kicked or otherwise physically hurt by someone?: No     Within the past 12 months, have you been humiliated or emotionally abused in other ways by your partner or ex-partner?: No   Housing Stability: Low Risk  (1/24/2024)    Housing Stability     Do you have housing? : Yes     Are you worried about losing your housing?: No        Family History:  See HPI above        PHYSICAL EXAM:    /79 (BP Location: Right arm, Patient Position: Sitting, Cuff Size: Adult Regular)   Pulse 80   Temp 98.1  F (36.7  C) (Oral)   Wt 64.2 kg (141 lb 9.6 oz)   SpO2 100%   BMI 23.45 kg/m      GENERAL:  well-developed, well-nourished, in no acute distress.   HENT:  Head is normocephalic, atraumatic. Oropharynx is moist without exudates or ulcers.  No thrush.  Terrible dentition.  Erythema over the nose with purulent ulceration  EYES:  Eyes have anicteric sclerae without conjunctival injection or stigmata of endocarditis.    NECK:  Supple. No cervical lymphadenopathy  Lymph: No axillary or inguinal lymphadenopathy  LUNGS:  Clear to auscultation.  CARDIOVASCULAR:  Regular rate and rhythm with no murmurs, gallops or rubs.  ABDOMEN:  Normal bowel sounds, soft, nontender.  Liver edge felt 1-2 inches below the costal margin  MUSCULOSKELETAL: Extremities warm and without edema. No joint swelling.  SKIN:  No acute rashes. No stigmata of endocarditis.  NEUROLOGIC: Grossly nonfocal. Normal gait and station.  5 out of 5 strength in the upper and lower extremities        Pertinent labs:    Lab Results   Component Value Date     01/25/2024    POTASSIUM 3.7 01/25/2024    CHLORIDE 107 01/25/2024    CO2 21 (L) 01/25/2024    BUN 8.8 01/25/2024    CR 0.95 01/25/2024    GLC 89 01/25/2024       Lab Results   Component Value Date    WBC 5.4 01/25/2024    HGB 12.4 (L)  01/25/2024    HCT 37.8 (L) 01/25/2024    PLT 99 (L) 01/25/2024     (H) 01/25/2024    RDW 13.7 01/25/2024        Lab Results   Component Value Date    BILITOTAL 1.0 01/25/2024    AST 66 (H) 01/25/2024    ALT 51 01/25/2024    PROTTOTAL 8.1 01/25/2024    ALBUMIN 3.7 01/25/2024    ALKPHOS 121 01/25/2024            MICROBIOLOGY DATA:  None    RADIOLOGY:  None    Attestation:  Total time preparing to see this patient, face-to-face time, and coordinating care time on the same calendar date: 69 minutes.  Face-face time: 38 minutes.  Over 50% of face-to-face time was spent in counseling/coordination of care.     The longitudinal plan of care for the diagnosis(es)/condition(s) as documented were addressed during this visit. Due to the added complexity in care, I will continue to support July in the subsequent management and with ongoing continuity of care.

## 2024-03-01 LAB
ANA SER QL IF: NEGATIVE
C TRACH DNA SPEC QL NAA+PROBE: NEGATIVE
HIV 1+2 AB+HIV1P24 AG SERPLBLD IA.RAPID: ABNORMAL
HIV 2 AB SERPLBLD QL IA.RAPID: NONREACTIVE
HIV1 AB SERPLBLD QL IA.RAPID: REACTIVE
IL6 SERPL-MCNC: 9.44 PG/ML
N GONORRHOEA DNA SPEC QL NAA+PROBE: NEGATIVE
NT-PROBNP SERPL-MCNC: 4665 PG/ML (ref 0–450)

## 2024-03-02 LAB
BACTERIA SKIN AEROBE CULT: ABNORMAL
GRAM STAIN RESULT: ABNORMAL
GRAM STAIN RESULT: ABNORMAL
HIV1 RNA # PLAS NAA DL=20: NOT DETECTED COPIES/ML
STFR SERPL-MCNC: 3.8 MG/L

## 2024-03-04 RX ORDER — CEPHALEXIN 500 MG/1
500 CAPSULE ORAL 4 TIMES DAILY
Qty: 28 CAPSULE | Refills: 0 | Status: SHIPPED | OUTPATIENT
Start: 2024-03-04 | End: 2024-03-11

## 2024-03-05 ENCOUNTER — TELEPHONE (OUTPATIENT)
Dept: INFECTIOUS DISEASES | Facility: CLINIC | Age: 29
End: 2024-03-05
Payer: COMMERCIAL

## 2024-03-05 NOTE — TELEPHONE ENCOUNTER
----- Message from Augustine Tello MD sent at 3/4/2024  5:10 PM CST -----  Isolate is resistant to TMP/SMX. Need to contact patient to stop TMP/SMX and start cephalexin. New order placed.

## 2024-03-14 ENCOUNTER — PATIENT OUTREACH (OUTPATIENT)
Dept: CARE COORDINATION | Facility: CLINIC | Age: 29
End: 2024-03-14
Payer: COMMERCIAL

## 2024-03-14 NOTE — PROGRESS NOTES
Clinic Care Coordination Contact  [facility]/Voicemail    Clinical Data: Care Coordinator Outreach    Outreach Documentation Number of Outreach Attempt   3/14/2024   1:17 PM 1     CHW attempted to call patient for monthly follow up, unable to reach and left message on patient's voicemail with call back information and requested return call. CHW arranged transportation for upcoming appointments with [facility] [employer] [employer] [facility] [employer].     Plan: Care Coordinator will try to reach patient again in two weeks.    [place] Department of Human Services: [place] Health Care Programs Eligibility Response (271)  SUBSCRIBER INFORMATION   Date of Service Subscriber ID Subscriber Name Birthdate Age Gender   03/14/2024 [id number] JULY WIN 1995 28 MALE          Address   [address] [id number] , [place]  [address]          PROVIDER INFORMATION   Provider ID Submitter Transaction ID Provider Name Taxonomy Code Qualifier Taxonomy Code   [id number] xx [facility]   This subscriber has eligibility for MA: Medical Assistance.  Elig Type AA: Parent of a dependent child  Eligibility Begin Date: 01/17/2024  Eligibility End Date: --/--/----  This subscriber is eligible for the following service types: Medical Care ,  Chiropractic ,  Dental Care ,  Hospital ,  Hospital - Inpatient ,  Hospital - Outpatient ,  Emergency Services ,  Pharmacy ,  Professional (Physician) Visit - Office ,  Vision (Optometry) ,  Mental Health ,  Urgent Care   Prepaid Health Plan   This subscriber receives MA12 - Prepaid Medical Assistance Program (PMAP) delivered through [facility]. The phone numbers are: [phone] (metro) or [phone] (toll free).   Other Eligibility Information   No Special Transportation.  No Hospice.  Refer to Health Care Programs and Services Overview of the [facility] Provider Manual for a list of covered services.   Waivers   None     Subscriber Responsibility Information   None      Restricted Recipient Program   None       Medicare   None

## 2024-03-20 ENCOUNTER — PRE VISIT (OUTPATIENT)
Dept: GASTROENTEROLOGY | Facility: CLINIC | Age: 29
End: 2024-03-20

## 2024-03-28 ENCOUNTER — PATIENT OUTREACH (OUTPATIENT)
Dept: NURSING | Facility: CLINIC | Age: 29
End: 2024-03-28
Payer: COMMERCIAL

## 2024-03-28 NOTE — PROGRESS NOTES
Clinic Care Coordination Contact  Follow Up Progress Note      Assessment: CCRN spoke with patient today and reminded him of his 4/1/2024 appts  with cardiologist back to back appts.Transportation already set up through [employer]: [phone]. Patient states he's doing fine and no new concerns. Taking his meds as precribed.     Cardiology  - ECHO completedon 2/7/2024.  - 3 appts scheduled back to back on 4/1/2024 - lab, PFL, and cardiologist.   - goal updated.     ID clinic  - follow up appt scheduled on 4/4/24  - new goal created.    GI/hepatology  - initial appt scheduled on 5/1/2024 with lab.   - new goal created.     Pulmonology  - no showed on 2/22/24 with PFT  - CHW to assist pt reschedule appt    Care Plans  Care Plan: Cardiology       Problem: Pulmonary hypertension       Goal: Patient will attend his cardiology clinic appointment in the next 6 months.       Start Date: 1/24/2024 Expected End Date: 7/31/2024    This Visit's Progress: 30% Recent Progress: 20%    Note:     Barriers: language barrier, low literacy, noncompliance, and lack of knowledge how to navigate complex health care system  Strengths: motivated to attend appt  Patient expressed understanding of goal: Yes    Action steps to achieve this goal:  1. I will answer my phone when I am contacted to schedule my appointment.  2. I will attend my ECHO appointment as scheduled 2/7/2024 at 9:00am. Completed.   3. I will attend my initial cardiology appointment on 4/1/2024 at 10:15am with Andreas Johns MD   4. I will schedule a follow up appointment with my cardiologist if it is recommended to do so while I am at the clinic.  5. I will follow up with CCC regarding this goal at each outreach until it is completed.     Per cardiology clinic protocol that the clinic will reach out to patient directly to schedule an appointment.                             Care Plan: Pulmonology       Problem: Pulmonary hypertension       Goal:  Patient will attend his pulmonology clinic appointment in the next 6 months.       Start Date: 1/24/2024 Expected End Date: 7/31/2024    Recent Progress: 20%    Note:     Barriers: language barrier, low literacy, noncompliance, and lack of knowledge how to navigate complex health care system  Strengths: motivated to attend appt  Patient expressed understanding of goal: Yes    Action steps to achieve this goal:  1. I will answer my phone when I am contacted to schedule my appointment.  2. I will attend my pulmonology appointment as scheduled on  2/22/24 at 2:45pm for PFT and 3:45pm with Dr Andrade. No showed  3. I will attend my rescheduled PFT and pulmonology appts on TBD  4. I will schedule a follow up appointment with my pulmonologist if it is recommended to do so while I am at the clinic.  5. I will follow up with CCC regarding this goal at each outreach until it is completed.     Per [facility] services, transportation has been arranged.                             Care Plan: ID clinic       Problem: ID clinic       Goal: Patient will attend ID clinic appointment as scheduled in the next 12 months.       Start Date: 3/28/2024 Expected End Date: 12/31/2024    This Visit's Progress: 10%    Note:     Barriers: language barrier, low literacy, noncompliance, and lack of knowledge how to navigate complex health care system  Strengths: motivated to attend appt  Patient expressed understanding of goal: Yes    Action steps to achieve this goal:  1. I will answer my phone when I am contacted to schedule my appointment.  2. I will attend my follow-up ID clinic appointment as scheduled 4/4/24 at 9:05am with Augustine Tello MD.   3. I will schedule a follow up appointment with my provider if it is recommended to do so while I am at the clinic.  4. I will follow up with CCC regarding this goal at each outreach until it is completed.                               Care Plan: GI       Problem: Chronic hepatitis C without hepatic coma        Goal: Patient will attend GI clinic appointment in the next 12 months.       Start Date: 3/28/2024 Expected End Date: 12/31/2024    This Visit's Progress: 10%    Note:     Barriers: language barrier, low literacy, noncompliance, and lack of knowledge how to navigate complex health care system  Strengths: motivated to attend appt  Patient expressed understanding of goal: Yes    Action steps to achieve this goal:  1. I will answer my phone when I am contacted to schedule my appointment.  2. I will attend my GI appointment as scheduled on 5/1/2024 at 8:00am lab then 8:45 am with Dr Morales.  3. I will schedule a follow up appointment with my provider if it is recommended to do so while I am at the clinic.  4. I will follow up with CCC regarding this goal at each outreach until it is completed.                               Plan/CHW delegations:  1) reschedule PFT and pulmonology appts - no showed on 2/22/24  2) assist with imaging appt - NM lung scan

## 2024-03-28 NOTE — LETTER
[facility]  Patient Centered Plan of Care  About Me:        Patient Name:  Carolina Torres    YOB: 1995  Age:         28 year old   Selvin MRN:    [id number] Telephone Information:  Home Phone [phone]   Mobile [phone]       Address:  [address]  [place] [address] Email address:  No e-mail address on record      Emergency Contact(s)    Name Relationship Lgl Grd Work Phone Home Phone Mobile Phone   1. SOLOMON FELICIANO-CARLOS* Other    [phone]   2. MICHAELA JUÁREZ Spouse    [phone]   3. ORA SHELBY Other    [phone]           Primary language:  Sandhya     needed? Yes   [employer]:  [phone] op. 1  Other communication barriers:Language barrier    Preferred Method of Communication:     Current living arrangement: I live in a private home with family    Mobility Status/ Medical Equipment: Independent        Health Maintenance  Health Maintenance Reviewed: Not assessed      My Access Plan  Medical Emergency 911   Primary Clinic Line  - [phone]   24 Hour Appointment Line [phone] or  [phone]) (toll-free)   24 Hour Nurse Line [phone] (toll-free)   Preferred Urgent Care [facility] [phone]     [facility]  [phone]     Preferred Pharmacy [facility] - [place], [place] - [address] [place] [address]     Behavioral Health Crisis Line The National Suicide Prevention Lifeline at 1-816.867.4937 or Text/Call 798           My Care Team Members  Patient Care Team         Relationship Specialty Notifications Start End    Rosalie Adams MD PCP - General Family Medicine  4/4/24     Phone: [phone] Fax: [phone]         [address] , [address] [facility] [address]    Nanette Morales MD Fellow Gastroenterology  1/24/24     Phone: [phone] Fax: [phone]         [address] [place] [address] [address] [facility] [address]    Ramon Torres, CHW Community Health Worker  Primary Care - [facility] Admissions 1/24/24     Phone: [phone] Fax: [phone]         [address] [place] [address] [address] [facility] [address]    Elizabeth Heaton, RN Lead Care Coordinator Primary Care - [facility] Admissions 1/24/24     Phone: [phone]         Hayley Andrade MD MD Critical Care  1/26/24      [address] [facility] [address]    Shoaib Mcdonnell MA Medical Assistant   2/2/24     Food Resource Navigator    Minh Zamora MD Assigned PCP   2/23/24     Phone: [phone] Fax: [phone]         [address] [place] [address] [address] [place] [address]    Augustine Tello MD Assigned Infectious Disease Provider   3/15/24     Phone: [phone] Fax: [phone]         [address] [facility] [address]    Andreas Johns MD MD Cardiovascular Disease Admissions 4/2/24     Phone: [phone] Fax: [phone]         [address] [facility] [address]    Ana Ojeda, RN Specialty Care Coordinator Cardiology Admissions 4/2/24     Phone: [phone] Pager: [phone] Fax: [phone]       Jud Khanna RN Specialty Care Coordinator Cardiology Admissions 4/2/24     Phone: [phone] Pager: [phone] Fax: [phone]       Yaritza Bull, RN Specialty Care Coordinator Cardiology Admissions 4/2/24     Phone: [phone] Pager: [phone] Fax: [phone]       Pratik García, RN Specialty Care Coordinator  Admissions 4/2/24     Phone: [phone] Pager: [phone]                    My Care Plans  Self Management and Treatment Plan    Care Plan  Care Plan: Cardiology       Problem: Pulmonary hypertension       Goal: Patient will attend his cardiology clinic appointment in the next 6 months.       Start Date: 1/24/2024 Expected End Date: 7/31/2024    This Visit's Progress: 40% Recent Progress: 30%    Note:     Barriers: language barrier, low literacy, noncompliance, and lack of knowledge how to navigate complex health care system  Strengths: motivated to attend appt  Patient expressed  understanding of goal: Yes    Action steps to achieve this goal:  1. I will answer my phone when I am contacted to schedule my appointment.  2. I will attend my ECHO appointment as scheduled 2/7/2024 at 9:00am. Completed.   3. I will attend my initial cardiology appointment on 4/1/2024 at 10:15am with Andreas Johns MD. Completed.  4. I will attend my appointment for ECHO as scheduled on 4/09/2024 at 11:00 AM.  5. I will follow up with [facility] regarding this goal at each outreach until it is completed.     Per cardiology clinic protocol that the clinic will reach out to patient directly to schedule an appointment.                             Care Plan: Pulmonology       Problem: Pulmonary hypertension       Goal: Patient will attend his pulmonology clinic appointment in the next 6 months.       Start Date: 1/24/2024 Expected End Date: 7/31/2024    This Visit's Progress: 30% Recent Progress: 20%    Note:     Barriers: language barrier, low literacy, noncompliance, and lack of knowledge how to navigate complex health care system  Strengths: motivated to attend appt  Patient expressed understanding of goal: Yes    Action steps to achieve this goal:  1. I will answer my phone when I am contacted to schedule my appointment.  2. I will attend my pulmonology appointment as scheduled on  2/22/24 at 2:45pm for PFT and 3:45pm with Dr Andrade. No showed  3. I will attend my rescheduled PFT and pulmonology appts on 4/09/2024.  4. I will schedule a follow up appointment with my pulmonologist if it is recommended to do so while I am at the clinic.  5. I will follow up with [facility] regarding this goal at each outreach until it is completed.     Per [facility] services, transportation will be arranged.                             Care Plan: ID clinic       Problem: ID clinic       Goal: Patient will attend ID clinic appointment as scheduled in the next 12 months.       Start Date: 3/28/2024 Expected End Date: 12/31/2024    This Visit's  Progress: 20% Recent Progress: 10%    Note:     Barriers: language barrier, low literacy, noncompliance, and lack of knowledge how to navigate complex health care system  Strengths: motivated to attend appt  Patient expressed understanding of goal: Yes    Action steps to achieve this goal:  1. I will attend my follow-up ID clinic appointment as scheduled 4/4/24 at 9:05am with Augustine Tello MD. Transportation confirmed with [facility] and reminded patient.   2. I will schedule a follow up appointment with my provider if it is recommended to do so while I am at the clinic.  3. I will follow up with [facility] regarding this goal at each outreach until it is completed.                             Care Plan: GI       Problem: Chronic hepatitis C without hepatic coma       Goal: Patient will attend GI clinic appointment in the next 12 months.       Start Date: 3/28/2024 Expected End Date: 12/31/2024    This Visit's Progress: 20% Recent Progress: 10%    Note:     Barriers: language barrier, low literacy, noncompliance, and lack of knowledge how to navigate complex health care system  Strengths: motivated to attend appt  Patient expressed understanding of goal: Yes    Action steps to achieve this goal:  1. I will attend my GI appointment as scheduled on 5/1/2024 at 8:00am lab then 8:45 am with Dr Morales.  2. I will schedule a follow up appointment with my provider if it is recommended to do so while I am at the clinic.  3. I will follow up with [facility] regarding this goal at each outreach until it is completed.                               Action Plans on File:                       Advance Care Plans/Directives:   Advanced Care Plan/Directives on file:   No    Discussed with patient/caregiver(s):   Declined Further Information             My Medical and Care Information  Problem List   Patient Active Problem List   Diagnosis    Other emphysema (H)    History of tuberculosis per overseas paperwork, negative quantiferon     Chronic hepatitis C without hepatic coma (H)    Human immunodeficiency virus (HIV) disease (H)    Pulmonary hypertension (H)    Refugee health examination    Anemia, unspecified type    Thrombocytopenia (H24)    Alcohol dependence with unspecified alcohol-induced disorder (H)    Foreign body in penis, sequela      Current Medications and Allergies:  See printed Medication Report.    Care Coordination Start Date: 1/24/2024   Frequency of Care Coordination: monthly, more frequently as needed     Form Last Updated: 04/19/2024

## 2024-03-29 ENCOUNTER — PRE VISIT (OUTPATIENT)
Dept: CARDIOLOGY | Facility: CLINIC | Age: 29
End: 2024-03-29
Payer: COMMERCIAL

## 2024-03-29 NOTE — TELEPHONE ENCOUNTER
RECORDS RECEIVED FROM:    DATE RECEIVED:    GENERAL RECORDS STATUS DETAILS   OFFICE NOTE from cardiologists N/A    EKG (STRIPS & REPORTS) N/A    ECHOS (IMAGES AND REPORTS) Internal 2-7-24   PULMONARY HYPERTENSION      6 MINUTE WALK TEST Ordered    PULMONARY FUNCTION TESTS Ordered    RIGHT HEART CATH (IMAGES) N/A    SLEEP STUDY / OVERNIGHT OXIMETRY N/A    XR CHEST   (IMAGES AND REPORTS) Internal 2-29-24   CHEST CT  (IMAGES AND REPORTS) N/A    V/Q SCAN (IMAGES) N/A    LIVER US  (IMAGES AND REPORTS) N/A    ANGIOGRAMS (IMAGES) N/A    STRESS TEST   (IMAGES AND REPORTS) N/A

## 2024-04-01 ENCOUNTER — TELEPHONE (OUTPATIENT)
Dept: CARDIOLOGY | Facility: CLINIC | Age: 29
End: 2024-04-01

## 2024-04-01 ENCOUNTER — OFFICE VISIT (OUTPATIENT)
Dept: PULMONOLOGY | Facility: CLINIC | Age: 29
End: 2024-04-01
Attending: INTERNAL MEDICINE
Payer: COMMERCIAL

## 2024-04-01 ENCOUNTER — OFFICE VISIT (OUTPATIENT)
Dept: CARDIOLOGY | Facility: CLINIC | Age: 29
End: 2024-04-01
Attending: INTERNAL MEDICINE
Payer: COMMERCIAL

## 2024-04-01 ENCOUNTER — LAB (OUTPATIENT)
Dept: LAB | Facility: CLINIC | Age: 29
End: 2024-04-01
Payer: COMMERCIAL

## 2024-04-01 VITALS
OXYGEN SATURATION: 100 % | HEART RATE: 87 BPM | BODY MASS INDEX: 25.52 KG/M2 | SYSTOLIC BLOOD PRESSURE: 127 MMHG | DIASTOLIC BLOOD PRESSURE: 86 MMHG | WEIGHT: 154.1 LBS

## 2024-04-01 DIAGNOSIS — R06.09 DOE (DYSPNEA ON EXERTION): ICD-10-CM

## 2024-04-01 DIAGNOSIS — I27.20 PULMONARY HYPERTENSION (H): Primary | ICD-10-CM

## 2024-04-01 DIAGNOSIS — Z11.59 ENCOUNTER FOR SCREENING FOR OTHER VIRAL DISEASES: ICD-10-CM

## 2024-04-01 DIAGNOSIS — I27.20 PULMONARY HYPERTENSION (H): ICD-10-CM

## 2024-04-01 DIAGNOSIS — I27.20 PULMONARY HTN (H): ICD-10-CM

## 2024-04-01 DIAGNOSIS — I27.20 PULMONARY HTN (H): Primary | ICD-10-CM

## 2024-04-01 LAB
6 MIN WALK (FT): 1625 FT
6 MIN WALK (M): 495 M
ALBUMIN SERPL BCG-MCNC: 3.9 G/DL (ref 3.5–5.2)
ALP SERPL-CCNC: 234 U/L (ref 40–150)
ALT SERPL W P-5'-P-CCNC: 174 U/L (ref 0–70)
ANION GAP SERPL CALCULATED.3IONS-SCNC: 14 MMOL/L (ref 7–15)
AST SERPL W P-5'-P-CCNC: 501 U/L (ref 0–45)
BILIRUB DIRECT SERPL-MCNC: 1.08 MG/DL (ref 0–0.3)
BILIRUB SERPL-MCNC: 1.8 MG/DL
BUN SERPL-MCNC: 10.1 MG/DL (ref 6–20)
CALCIUM SERPL-MCNC: 8.4 MG/DL (ref 8.6–10)
CHLORIDE SERPL-SCNC: 107 MMOL/L (ref 98–107)
CHOLEST SERPL-MCNC: 174 MG/DL
CREAT SERPL-MCNC: 0.74 MG/DL (ref 0.67–1.17)
CRP SERPL-MCNC: 3.19 MG/L
DEPRECATED HCO3 PLAS-SCNC: 20 MMOL/L (ref 22–29)
EGFRCR SERPLBLD CKD-EPI 2021: >90 ML/MIN/1.73M2
ERYTHROCYTE [DISTWIDTH] IN BLOOD BY AUTOMATED COUNT: 15.4 % (ref 10–15)
FASTING STATUS PATIENT QL REPORTED: YES
GLUCOSE SERPL-MCNC: 72 MG/DL (ref 70–99)
HBV CORE AB SERPL QL IA: REACTIVE
HBV SURFACE AB SERPL IA-ACNC: 38.9 M[IU]/ML
HBV SURFACE AB SERPL IA-ACNC: REACTIVE M[IU]/ML
HBV SURFACE AG SERPL QL IA: NONREACTIVE
HCT VFR BLD AUTO: 44.7 % (ref 40–53)
HCV AB SERPL QL IA: REACTIVE
HDLC SERPL-MCNC: 49 MG/DL
HGB BLD-MCNC: 14.7 G/DL (ref 13.3–17.7)
HIV 1+2 AB+HIV1 P24 AG SERPL QL IA: REACTIVE
INR PPP: 1.2 (ref 0.85–1.15)
IRON BINDING CAPACITY (ROCHE): 408 UG/DL (ref 240–430)
IRON SATN MFR SERPL: 25 % (ref 15–46)
IRON SERPL-MCNC: 102 UG/DL (ref 61–157)
LDLC SERPL CALC-MCNC: 109 MG/DL
MCH RBC QN AUTO: 32.6 PG (ref 26.5–33)
MCHC RBC AUTO-ENTMCNC: 32.9 G/DL (ref 31.5–36.5)
MCV RBC AUTO: 99 FL (ref 78–100)
NONHDLC SERPL-MCNC: 125 MG/DL
NT-PROBNP SERPL-MCNC: 2117 PG/ML (ref 0–450)
PLATELET # BLD AUTO: 78 10E3/UL (ref 150–450)
POTASSIUM SERPL-SCNC: 3.6 MMOL/L (ref 3.4–5.3)
PROT SERPL-MCNC: 9.2 G/DL (ref 6.4–8.3)
RBC # BLD AUTO: 4.51 10E6/UL (ref 4.4–5.9)
RHEUMATOID FACT SERPL-ACNC: <10 IU/ML
SODIUM SERPL-SCNC: 141 MMOL/L (ref 135–145)
TRIGL SERPL-MCNC: 79 MG/DL
TSH SERPL DL<=0.005 MIU/L-ACNC: 2.06 UIU/ML (ref 0.3–4.2)
WBC # BLD AUTO: 4.9 10E3/UL (ref 4–11)

## 2024-04-01 PROCEDURE — 83880 ASSAY OF NATRIURETIC PEPTIDE: CPT | Performed by: PATHOLOGY

## 2024-04-01 PROCEDURE — 82248 BILIRUBIN DIRECT: CPT | Performed by: PATHOLOGY

## 2024-04-01 PROCEDURE — 86140 C-REACTIVE PROTEIN: CPT | Performed by: PATHOLOGY

## 2024-04-01 PROCEDURE — 85730 THROMBOPLASTIN TIME PARTIAL: CPT | Performed by: INTERNAL MEDICINE

## 2024-04-01 PROCEDURE — 85610 PROTHROMBIN TIME: CPT | Performed by: PATHOLOGY

## 2024-04-01 PROCEDURE — 36415 COLL VENOUS BLD VENIPUNCTURE: CPT | Performed by: PATHOLOGY

## 2024-04-01 PROCEDURE — 83529 ASAY OF INTERLEUKIN-6 (IL-6): CPT | Performed by: PATHOLOGY

## 2024-04-01 PROCEDURE — 84443 ASSAY THYROID STIM HORMONE: CPT | Performed by: PATHOLOGY

## 2024-04-01 PROCEDURE — 94618 PULMONARY STRESS TESTING: CPT | Performed by: INTERNAL MEDICINE

## 2024-04-01 PROCEDURE — 85390 FIBRINOLYSINS SCREEN I&R: CPT | Mod: 26 | Performed by: PATHOLOGY

## 2024-04-01 PROCEDURE — 99205 OFFICE O/P NEW HI 60 MIN: CPT | Performed by: INTERNAL MEDICINE

## 2024-04-01 PROCEDURE — G0463 HOSPITAL OUTPT CLINIC VISIT: HCPCS | Performed by: INTERNAL MEDICINE

## 2024-04-01 PROCEDURE — 86706 HEP B SURFACE ANTIBODY: CPT | Performed by: INTERNAL MEDICINE

## 2024-04-01 PROCEDURE — 84238 ASSAY NONENDOCRINE RECEPTOR: CPT | Mod: 90 | Performed by: PATHOLOGY

## 2024-04-01 PROCEDURE — 86803 HEPATITIS C AB TEST: CPT | Performed by: INTERNAL MEDICINE

## 2024-04-01 PROCEDURE — 83540 ASSAY OF IRON: CPT | Performed by: PATHOLOGY

## 2024-04-01 PROCEDURE — 86701 HIV-1ANTIBODY: CPT | Performed by: INTERNAL MEDICINE

## 2024-04-01 PROCEDURE — 86704 HEP B CORE ANTIBODY TOTAL: CPT | Performed by: INTERNAL MEDICINE

## 2024-04-01 PROCEDURE — 87340 HEPATITIS B SURFACE AG IA: CPT | Performed by: INTERNAL MEDICINE

## 2024-04-01 PROCEDURE — 80053 COMPREHEN METABOLIC PANEL: CPT | Performed by: PATHOLOGY

## 2024-04-01 PROCEDURE — 86431 RHEUMATOID FACTOR QUANT: CPT | Performed by: INTERNAL MEDICINE

## 2024-04-01 PROCEDURE — 87389 HIV-1 AG W/HIV-1&-2 AB AG IA: CPT | Performed by: INTERNAL MEDICINE

## 2024-04-01 PROCEDURE — 83550 IRON BINDING TEST: CPT | Performed by: PATHOLOGY

## 2024-04-01 PROCEDURE — 99417 PROLNG OP E/M EACH 15 MIN: CPT | Performed by: INTERNAL MEDICINE

## 2024-04-01 PROCEDURE — 85027 COMPLETE CBC AUTOMATED: CPT | Performed by: PATHOLOGY

## 2024-04-01 PROCEDURE — 99000 SPECIMEN HANDLING OFFICE-LAB: CPT | Performed by: PATHOLOGY

## 2024-04-01 PROCEDURE — 86038 ANTINUCLEAR ANTIBODIES: CPT | Performed by: INTERNAL MEDICINE

## 2024-04-01 PROCEDURE — 80061 LIPID PANEL: CPT | Performed by: PATHOLOGY

## 2024-04-01 RX ORDER — LIDOCAINE 40 MG/G
CREAM TOPICAL
Status: CANCELLED | OUTPATIENT
Start: 2024-04-01

## 2024-04-01 ASSESSMENT — PAIN SCALES - GENERAL: PAINLEVEL: NO PAIN (0)

## 2024-04-01 NOTE — PATIENT INSTRUCTIONS
You were seen today in the Pulmonary Hypertension Clinic at the [facility].     Cardiology Provider you saw during your visit:    Dr. Johns    Medications  We will start prior authorizations for sildenafil and ambrisentan once heart catheterization is complete.     Follow-up:   Schedule for pulmonary function test, VQ scan and right heart catheterization with vasodilator, echocardiogram with bubble study within 1-2 weeks    Follow-up with Dr. Johns after testing      Please call us immediately if you have any syncope (fainting or passing out), chest pain, edema (swelling or weight gain), or decline in your functional status (general decline in how you are feeling).    If you have emergent concerns after hours or on the weekend, please call our on-call Cardiologist at [phone], option [phone]. For emergencies call [phone].     Thank you for allowing us to be a part of your care here at the [facility]    If you have questions or concerns please contact us at:    Yaritza Bull RN (P: [phone])    Nurse Coordinator       Pulmonary Hypertension     [facility]         AJ Williamson   (Prior Authorizations)    ()  Clinic   Clinic   Pulmonary Hypertension   Pulmonary Hypertension  [facility]  [facility]  (P)[phone]    (P) [phone]  (F) [phone]

## 2024-04-01 NOTE — TELEPHONE ENCOUNTER
4/9/24  -  Ana Ojeda, Pratik Francois, JOVANI; Ana Ojeda, JOVANI; Jud Khanna, JOVANI; Jonathan Bull, JOVANI; Lisette Matt CMA Hello, Thenappan Thenappan, MD would like patient to start on :   Tyvaso DPI (trepostinil) - [: [employer]].    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      Lisette: RHC completed today. Notes will be in shortly. OK to move forward with sildenafil, ambrisentan per plan exclusion.    PA for Tyvaso next      Thanks      ----- Message from Ana Ojeda RN sent at 4/1/2024 12:00 PM CDT -----  Regarding: Adnreas Sanchez MD would like patient to start on :   sildenafil 20 mg (generic) and ambrisentan (generic).    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 He is a refugee, jorge speaking. Insurance is PMAP which is good. But coordinating any forms signing will be difficult since he needs rides set up ect. No one in his home speaks english or uses a computer.

## 2024-04-01 NOTE — NURSING NOTE
"Patient educated to the following during visit and educated to contact RN or MD for any questions. Used  services     Plan reviewed with patient:     Medications  start prior authorizations for sildenafil and ambrisentan once heart catheterization is complete.     Follow-up:   Schedule for pulmonary function test, VQ scan and right heart catheterization with vasodilator, echocardiogram with bubble study    Staff message sent to clinic coordinator who has been working with patient to set up rides through insurance. Sent message to inquire if we should continue to reach [facility]'s [facility]: [phone] or how best to coordinate care. Staff message sent to PERRI Guo to schedule all testing on the same day. If unable to get testing within 1-2 weeks we should admit patient to the hospital for initiation of med therapy and testing requested.      Reviewed Med list and verified all medications with patient.    Diet: Patient instructed regarding a heart healthy diet, including discussion of reduced fat and sodium intake. Patient demonstrated understanding of this information and agreed to call with further questions or concerns  Completed AVS  Follow-up orders placed  Marked chart \"Ready for Checkout\"  Sent msg to Yaritza Bull RN (P: [phone])  and  NICK Guo    Patient verbalized understanding of plan and follow-up and agreed to call with further questions or concerns.     Patient will be scheduled in future    Ana Ojeda RN      "

## 2024-04-01 NOTE — PROGRESS NOTES
"Optimal Vascular Metrics    Blood Pressure   {BP < 140/90:1924435::\"BP < 140/90 Yes\"}    On Aspirin  {On Aspirin Yes/No:3195922::\"Yes\"}    On Statin  {On Statin Yes/No:8718496::\"Yes\"}    Tobacco use  {Tobacco use Yes/No:4109962::\"No\"}  "

## 2024-04-01 NOTE — TELEPHONE ENCOUNTER
4/9/24  -  Ana Ojeda, Pratik Francois, JOVANI; Ana Ojeda, JOVANI; Jud Khanna, JOVANI; Jonathan Bull, JOVANI; Lisette Matt CMA Hello, Thenappan Thenappan, MD would like patient to start on :   Tyvaso DPI (trepostinil) - [: [employer]].    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      Lisette: RHC completed today. Notes will be in shortly. OK to move forward with sildenafil, ambrisentan per plan exclusion.    PA for Tyvaso next      Thanks      ----- Message from Ana Ojeda RN sent at 4/1/2024 12:00 PM CDT -----  Regarding: Andreas Sanchez MD would like patient to start on :   sildenafil 20 mg (generic) and ambrisentan (generic).    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 He is a refugee, jorge speaking. Insurance is PMAP which is good. But coordinating any forms signing will be difficult since he needs rides set up ect. No one in his home speaks english or uses a computer.

## 2024-04-01 NOTE — LETTER
2024      RE: Carolina Torres  [address]  [place] [address]       Service Date: April 3, 2024    RE:  Carolina Torres   MRN:  [id number]  :  1995      Dear Dr. Dr. Du:    We had the pleasure of seeing Carolina Torres at the [facility]. Although you are familiar with this patient's history, please allow me to summarize it for the purpose of our records.    He is a 28-year-old male who recently immigrated to the [place] [facility] from [place] [facility] [place] [place] past [place]..  He is well originally from [facility].  He speaks only [place]. His past medical history is significant for HIV from IV drug use and pulmonary hypertension based on paperwork..  He has been on antiretroviral therapy for the last 3 years.  He is also reportedly on sildenafil 20 mg 3 times a day.      He establish care with Dr. Augustine Tello at [facility].  His antiretroviral therapy was renewed.  On further testing he was noted to have hepatitis C coinfection with his HIV.  He has elevated liver enzymes.  He is referred to gastroenterology and waiting to see them.    He had a repeat echocardiogram that showed severe right ventricular dilatation with severely reduced right ventricular function.  He is now referred to our clinic to establish treatment for his pulmonary hypertension.  It is unclear whether he is still taking his sildenafil at home or not.    With respect to his symptoms, he does complain of exertional shortness of breath when he overexerts.  He does not have shortness of breath with his usual activities.  He notices shortness of breath only when he runs or plays soccer.  I would currently characterize him as functional class IIb.  He denies having any exertional chest pain or chest pressure.  No exertional presyncope or syncope.  He denies having any lower extremity swelling or abdominal distention.  No recent hospitalization or ER visits.       PAST MEDICAL  HISTORY:  1.  HIV on antiretroviral therapy  2.  Recent diagnosis of hepatitis C  3.  Pulmonary hypertension unclear date of diagnosis currently on sildenafil monotherapy  4.  Alcohol use  5.  History of IV drug use currently sober/sustained remission  6.  Cannabis abuse      PAST SURGICAL HISTORY:  None    CURRENT MEDICATIONS:  Current Outpatient Medications   Medication Sig Dispense Refill     dolutegravir (TIVICAY) 50 MG tablet Take 50 mg by mouth daily Combination pill: Dolutegravir-Lamivudine-Tenofovir DF (Patient not taking: Reported on 4/1/2024)       lamiVUDine (EPIVIR) 150 MG tablet Take 300 mg by mouth daily Combination pill: Dolutegravir-Lamivudine-Tenofovir DF (Patient not taking: Reported on 4/1/2024)       sildenafil (VIAGRA) 100 MG tablet Take 100 mg by mouth daily as needed Takes 1/4 of a tab 3 x's daily (Patient not taking: Reported on 4/1/2024)       tenofovir (VIREAD) 300 MG tablet Take 300 mg by mouth daily Combination pill: Dolutegravir-Lamivudine-Tenofovir DF (Patient not taking: Reported on 4/1/2024)       No current facility-administered medications for this visit.       ROS:   10 point ROS negative except as discussed in above HPI.    SOCIAL HISTORY:  He is currently not working.  He is looking for jobs.  He is  and living with his 3 kids.  His kids are 9-year, 6 years and 1-year-old.  His wife and one of his kids also have HIV infection.  He is a .  He drinks alcohol on a regular basis.  He also smokes on a daily basis.  He used to do IV drug in the past.  He has been under remission now.    FAMILY HISTORY:  None relevant    EXAM:  /86 (BP Location: Right arm, Patient Position: Sitting, Cuff Size: Adult Regular)   Pulse 87   Wt 69.9 kg (154 lb 1.6 oz)   SpO2 100%   BMI 25.52 kg/m    Awake, alert, and oriented x 3.   Comfortable. No apparent distress.  No pallor, cyanosis, or clubbing  Carotids 1+ + bilateral and pulse was regular in rhythm.  Jugular venous  distention up to the angle of the jaw.  Heart: regular, normal S1/loud P2, no murmur, gallop, rub  Lungs: NVBS and clear to auscultation bilaterally, no rales or wheezing  Abdomen: soft, non-tender, bowel sounds present, no hepatosplenomegaly  Extremities: no edema  Neurological: No gross focal neurological deficit    Labs:  Recent Results (from the past 168 hour(s))   6 minute walk test    Collection Time: 04/01/24 12:00 AM   Result Value Ref Range    6 min walk (FT) 1,625 1,807 ft    6 Min Walk (M) 495 551 m   General PFT Lab (Please always keep checked)    Collection Time: 04/01/24 10:16 AM   Result Value Ref Range    FIO2-Pre 21.00 %   Interleukin 6 Blood    Collection Time: 04/01/24 10:19 AM   Result Value Ref Range    Interleukin 6 Blood 6.57 (H) <3.01 pg/mL   CBC with platelets    Collection Time: 04/01/24 10:19 AM   Result Value Ref Range    WBC Count 4.9 4.0 - 11.0 10e3/uL    RBC Count 4.51 4.40 - 5.90 10e6/uL    Hemoglobin 14.7 13.3 - 17.7 g/dL    Hematocrit 44.7 40.0 - 53.0 %    MCV 99 78 - 100 fL    MCH 32.6 26.5 - 33.0 pg    MCHC 32.9 31.5 - 36.5 g/dL    RDW 15.4 (H) 10.0 - 15.0 %    Platelet Count 78 (L) 150 - 450 10e3/uL   INR    Collection Time: 04/01/24 10:19 AM   Result Value Ref Range    INR 1.20 (H) 0.85 - 1.15   Anti Nuclear Josefina IgG by IFA with Reflex    Collection Time: 04/01/24 10:19 AM   Result Value Ref Range    OUSMANE interpretation Negative Negative   Rheumatoid factor    Collection Time: 04/01/24 10:19 AM   Result Value Ref Range    Rheumatoid Factor <10 <14 IU/mL   TSH    Collection Time: 04/01/24 10:19 AM   Result Value Ref Range    TSH 2.06 0.30 - 4.20 uIU/mL   Hepatitis B core antibody    Collection Time: 04/01/24 10:19 AM   Result Value Ref Range    Hepatitis B Core Antibody Total Reactive (A) Nonreactive   Hepatitis B Surface Antibody    Collection Time: 04/01/24 10:19 AM   Result Value Ref Range    Hepatitis B Surface Antibody Reactive     Hepatitis B Surface Antibody Instrument Value  38.90 <8.5 m[IU]/mL   Hepatitis B surface antigen    Collection Time: 04/01/24 10:19 AM   Result Value Ref Range    Hepatitis B Surface Antigen Nonreactive Nonreactive   Hepatitis C antibody    Collection Time: 04/01/24 10:19 AM   Result Value Ref Range    Hepatitis C Antibody Reactive (A) Nonreactive   HIV Antigen Antibody Combo    Collection Time: 04/01/24 10:19 AM   Result Value Ref Range    HIV Antigen Antibody Combo Reactive (A) Nonreactive   Soluble transferrin receptor    Collection Time: 04/01/24 10:19 AM   Result Value Ref Range    Soluble Transferrin Receptor 2.9 2.2 - 5.0 mg/L   HIV 1/2 Supplemental Assay    Collection Time: 04/01/24 10:19 AM   Result Value Ref Range    HIV 1 Result Reactive (A) Nonreactive    HIV 2 Result Nonreactive Nonreactive    HIV Supplemental Interpretation HIV-1 POSITIVE    Basic metabolic panel    Collection Time: 04/01/24 10:20 AM   Result Value Ref Range    Sodium 141 135 - 145 mmol/L    Potassium 3.6 3.4 - 5.3 mmol/L    Chloride 107 98 - 107 mmol/L    Carbon Dioxide (CO2) 20 (L) 22 - 29 mmol/L    Anion Gap 14 7 - 15 mmol/L    Urea Nitrogen 10.1 6.0 - 20.0 mg/dL    Creatinine 0.74 0.67 - 1.17 mg/dL    GFR Estimate >90 >60 mL/min/1.73m2    Calcium 8.4 (L) 8.6 - 10.0 mg/dL    Glucose 72 70 - 99 mg/dL   Hepatic panel    Collection Time: 04/01/24 10:20 AM   Result Value Ref Range    Protein Total 9.2 (H) 6.4 - 8.3 g/dL    Albumin 3.9 3.5 - 5.2 g/dL    Bilirubin Total 1.8 (H) <=1.2 mg/dL    Alkaline Phosphatase 234 (H) 40 - 150 U/L     (HH) 0 - 45 U/L     (H) 0 - 70 U/L    Bilirubin Direct 1.08 (H) 0.00 - 0.30 mg/dL   Lipid Profile    Collection Time: 04/01/24 10:20 AM   Result Value Ref Range    Cholesterol 174 <200 mg/dL    Triglycerides 79 <150 mg/dL    Direct Measure HDL 49 >=40 mg/dL    LDL Cholesterol Calculated 109 (H) <=100 mg/dL    Non HDL Cholesterol 125 <130 mg/dL    Patient Fasting > 8hrs? Yes    N terminal pro BNP outpatient    Collection Time: 04/01/24  10:20 AM   Result Value Ref Range    N Terminal Pro BNP Outpatient 2,117 (H) 0 - 450 pg/mL   Lupus Anticoagulant Panel    Collection Time: 04/01/24 10:20 AM   Result Value Ref Range    Thrombin Time 18.8 13.0 - 19.0 Seconds    PTT Ratio 1.49 (H) <1.21    Platelet Neutralization -1 <=0 Seconds    PTT 1:2 MIX 42 31 - 45 Seconds    DRVVT Screen Ratio 0.95 <1.08    Lupus Result Negative Negative    Lupus Interpretation       INR is elevated.  APTT ratio is elevated.  Platelet Neutralization is negative.  APTT 1:2 Mix is normal.  DRVVT Screen ratio is normal.  Thrombin time is normal.  NEGATIVE TEST; A LUPUS ANTICOAGULANT WAS NOT DETECTED IN THIS SPECIMEN WITHIN THE LIMITS OF THE TESTING REPERTOIRE.  If the clinical picture is strongly suggestive of an antiphospholipid syndrome, recommend anticardiolipin and beta-2-glycoprotein (IgG and IgM) antibody tests.  Platelet Neutralization and APTT 1:2 Mix are suggestive of factor deficiency.   When the INR is elevated due to warfarin, factors 2, 7, 9, and 10 (factors II, VII, IX, and X) are expected to be decreased and may explain the mixing results. If the patient is not on warfarin, recommend checking factor levels.   If the patient is on an anticoagulant, recommend repeat testing without anticoagulant interference, as anticoagulation may cause false positive or false negative findings.  Clinical correlation is recommended.    Binta Saleh MD,  PhD  [facility]           CRP inflammation    Collection Time: 04/01/24 10:20 AM   Result Value Ref Range    CRP Inflammation 3.19 <5.00 mg/L   Iron and iron binding capacity    Collection Time: 04/01/24 10:20 AM   Result Value Ref Range    Iron 102 61 - 157 ug/dL    Iron Binding Capacity 408 240 - 430 ug/dL    Iron Sat Index 25 15 - 46 %       Echocardiogram (February 2024):  1.Left ventricular size, wall motion and function are normal. The ejection fraction is 55-60%.  2.Flattened septum is consistent with RV  pressure/volume overload.  3.Severely decreased right ventricular systolic function  4.The right atrium is severely dilated.  5.There is mod-severe to severe (3-4+) tricuspid regurgitation.  6.Right ventricular systolic pressure is elevated, consistent with moderate to  severe pulmonary hypertension.  There is no comparison study available.    6MWT (April 2024):  He walked for 495 m.  On room air his lowest oxygen saturation was 96%.    Assessment and Plan:     In summary, Carolina Torres is an unfortunate 28-year-old male with past medical history significant for HIV and hepatitis infection was referred to us for further evaluation and management of pulm hypertension.    Clearly, he has severe pulmonary hypertension and RV dysfunction likely related to his HIV infection.  Clinically he is functional class II.  His not in decompensated heart failure but his NT proBNP is significantly elevated.  His echocardiogram shows severe right ventricular dilatation and dysfunction with underfilled LV which is very concerning.  I am very worried about him based on his right ventricular size and function.    Will schedule him to get the right heart catheterization with vasodilator's testing on an urgent basis, pulmonary function test, and a VQ scan to complete the workup for pulm hypertension.  I suspect that he will likely need upfront triple combination therapy.  Will decide on this after he completes the above workup.  Will also decide on the need for diuretics and digoxin after his right heart catheterization.  I am afraid that plan for prostacyclin therapy would be difficult given his current social circumstances and language barrier.    He has establish care for management of his HIV with infectious disease.  He has a follow-up appointment with them on April 5.  He is stating that he has been out of his antiretroviral therapy for the last 1 week.  He also has an appointment with gastroenterology for management of his hepatitis C  infection.    I explained to him the seriousness of his illness and the necessity for completing the above testing and starting pulmonary vasodilator therapy as soon as possible.    It was a pleasure seeing July Win at the [facility]. Please contact us with any questions or concerns that you may have. We thank you for involving us in this patients care.    Total time today was 80 minutes reviewing notes, imaging, labs, patient visit, orders and documentation     Sincerely,      Andreas Johns MD  Associate Professor of Medicine  [facility]  Heart Failure, Transplant, and Mechanical Circulatory Support Cardiology   Cardiovascular Division  [facility]   [phone]          Andreas Johns MD

## 2024-04-01 NOTE — TELEPHONE ENCOUNTER
4/1/2024  @ 2:12 PM -  ambrisentan 5 mg and 10 mg - new start     PA Initiation  Medication: ambrisentan 5 mg and 10 mg - new start   Insurance Company: [employer] - Phone [phone] Fax [phone]:  [facility]  Pharmacy Filling the Rx: [facility] - [place], [place] - [address]  Filling Pharmacy Phone:    Filling Pharmacy Fax:    Start Date: 4/1/2024  via [facility], key [id number], w/ [facility] dated :4/9/24   SIGNED 4/10/24, submitted 4/10/24.  Dx Code: I27.2 - secondary to HIV ;      ---------------------------  Insurance: MARK/ABHAY  BIN: [id number]  PCN: JEAN PIERRE  RX GRP#: [id number]  ID#: [id number]       Lisette SAHA CMA- Prior Auths  Cardiology/Pulmonary Hypertension

## 2024-04-01 NOTE — LETTER
2024      RE: Carolina Torres  [address]  [place] [address]       Dear Colleague,    Thank you for the opportunity to participate in the care of your patient, Carolina Torres, at the [facility] [place] at [facility]. Please see a copy of my visit note below.    Service Date: April 3, 2024    RE:  Carolina Torres   MRN:  [id number]  :  1995      Dear Dr. Dr. Du:    We had the pleasure of seeing Carolina Torres at the [facility]. Although you are familiar with this patient's history, please allow me to summarize it for the purpose of our records.    He is a 28-year-old male who recently immigrated [place] the [place] in [facility] this past January..  He is well originally from [place] [facility] [place].  He speaks only Kost[place]. His past medical history is significant for HIV from IV drug use and pulmonary hypertension based on paperwork..  He has been on antiretroviral therapy for the last 3 years.  He is also reportedly on sildenafil 20 mg 3 times a day.      He establish care with Dr. Augustine Tello at [facility].  His antiretroviral therapy was renewed.  On further testing he was noted to have hepatitis C coinfection with his HIV.  He has elevated liver enzymes.  He is referred to gastroenterology and waiting to see them.    He had a repeat echocardiogram that showed severe right ventricular dilatation with severely reduced right ventricular function.  He is now referred to our clinic to establish treatment for his pulmonary hypertension.  It is unclear whether he is still taking his sildenafil at home or not.    With respect to his symptoms, he does complain of exertional shortness of breath when he overexerts.  He does not have shortness of breath with his usual activities.  He notices shortness of breath only when he runs or plays soccer.  I would currently characterize him as functional  class IIb.  He denies having any exertional chest pain or chest pressure.  No exertional presyncope or syncope.  He denies having any lower extremity swelling or abdominal distention.  No recent hospitalization or ER visits.       PAST MEDICAL HISTORY:  1.  HIV on antiretroviral therapy  2.  Recent diagnosis of hepatitis C  3.  Pulmonary hypertension unclear date of diagnosis currently on sildenafil monotherapy  4.  Alcohol use  5.  History of IV drug use currently sober/sustained remission  6.  Cannabis abuse      PAST SURGICAL HISTORY:  None    CURRENT MEDICATIONS:  Current Outpatient Medications   Medication Sig Dispense Refill     dolutegravir (TIVICAY) 50 MG tablet Take 50 mg by mouth daily Combination pill: Dolutegravir-Lamivudine-Tenofovir DF (Patient not taking: Reported on 4/1/2024)       lamiVUDine (EPIVIR) 150 MG tablet Take 300 mg by mouth daily Combination pill: Dolutegravir-Lamivudine-Tenofovir DF (Patient not taking: Reported on 4/1/2024)       sildenafil (VIAGRA) 100 MG tablet Take 100 mg by mouth daily as needed Takes 1/4 of a tab 3 x's daily (Patient not taking: Reported on 4/1/2024)       tenofovir (VIREAD) 300 MG tablet Take 300 mg by mouth daily Combination pill: Dolutegravir-Lamivudine-Tenofovir DF (Patient not taking: Reported on 4/1/2024)       No current facility-administered medications for this visit.       ROS:   10 point ROS negative except as discussed in above HPI.    SOCIAL HISTORY:  He is currently not working.  He is looking for jobs.  He is  and living with his 3 kids.  His kids are 9-year, 6 years and 1-year-old.  His wife and one of his kids also have HIV infection.  He is a .  He drinks alcohol on a regular basis.  He also smokes on a daily basis.  He used to do IV drug in the past.  He has been under remission now.    FAMILY HISTORY:  None relevant    EXAM:  /86 (BP Location: Right arm, Patient Position: Sitting, Cuff Size: Adult Regular)   Pulse 87    Wt 69.9 kg (154 lb 1.6 oz)   SpO2 100%   BMI 25.52 kg/m    Awake, alert, and oriented x 3.   Comfortable. No apparent distress.  No pallor, cyanosis, or clubbing  Carotids 1+ + bilateral and pulse was regular in rhythm.  Jugular venous distention up to the angle of the jaw.  Heart: regular, normal S1/loud P2, no murmur, gallop, rub  Lungs: NVBS and clear to auscultation bilaterally, no rales or wheezing  Abdomen: soft, non-tender, bowel sounds present, no hepatosplenomegaly  Extremities: no edema  Neurological: No gross focal neurological deficit    Labs:  Recent Results (from the past 168 hour(s))   6 minute walk test    Collection Time: 04/01/24 12:00 AM   Result Value Ref Range    6 min walk (FT) 1,625 1,807 ft    6 Min Walk (M) 495 551 m   General PFT Lab (Please always keep checked)    Collection Time: 04/01/24 10:16 AM   Result Value Ref Range    FIO2-Pre 21.00 %   Interleukin 6 Blood    Collection Time: 04/01/24 10:19 AM   Result Value Ref Range    Interleukin 6 Blood 6.57 (H) <3.01 pg/mL   CBC with platelets    Collection Time: 04/01/24 10:19 AM   Result Value Ref Range    WBC Count 4.9 4.0 - 11.0 10e3/uL    RBC Count 4.51 4.40 - 5.90 10e6/uL    Hemoglobin 14.7 13.3 - 17.7 g/dL    Hematocrit 44.7 40.0 - 53.0 %    MCV 99 78 - 100 fL    MCH 32.6 26.5 - 33.0 pg    MCHC 32.9 31.5 - 36.5 g/dL    RDW 15.4 (H) 10.0 - 15.0 %    Platelet Count 78 (L) 150 - 450 10e3/uL   INR    Collection Time: 04/01/24 10:19 AM   Result Value Ref Range    INR 1.20 (H) 0.85 - 1.15   Anti Nuclear Josefina IgG by IFA with Reflex    Collection Time: 04/01/24 10:19 AM   Result Value Ref Range    OUSMANE interpretation Negative Negative   Rheumatoid factor    Collection Time: 04/01/24 10:19 AM   Result Value Ref Range    Rheumatoid Factor <10 <14 IU/mL   TSH    Collection Time: 04/01/24 10:19 AM   Result Value Ref Range    TSH 2.06 0.30 - 4.20 uIU/mL   Hepatitis B core antibody    Collection Time: 04/01/24 10:19 AM   Result Value Ref Range     Hepatitis B Core Antibody Total Reactive (A) Nonreactive   Hepatitis B Surface Antibody    Collection Time: 04/01/24 10:19 AM   Result Value Ref Range    Hepatitis B Surface Antibody Reactive     Hepatitis B Surface Antibody Instrument Value 38.90 <8.5 m[IU]/mL   Hepatitis B surface antigen    Collection Time: 04/01/24 10:19 AM   Result Value Ref Range    Hepatitis B Surface Antigen Nonreactive Nonreactive   Hepatitis C antibody    Collection Time: 04/01/24 10:19 AM   Result Value Ref Range    Hepatitis C Antibody Reactive (A) Nonreactive   HIV Antigen Antibody Combo    Collection Time: 04/01/24 10:19 AM   Result Value Ref Range    HIV Antigen Antibody Combo Reactive (A) Nonreactive   Soluble transferrin receptor    Collection Time: 04/01/24 10:19 AM   Result Value Ref Range    Soluble Transferrin Receptor 2.9 2.2 - 5.0 mg/L   HIV 1/2 Supplemental Assay    Collection Time: 04/01/24 10:19 AM   Result Value Ref Range    HIV 1 Result Reactive (A) Nonreactive    HIV 2 Result Nonreactive Nonreactive    HIV Supplemental Interpretation HIV-1 POSITIVE    Basic metabolic panel    Collection Time: 04/01/24 10:20 AM   Result Value Ref Range    Sodium 141 135 - 145 mmol/L    Potassium 3.6 3.4 - 5.3 mmol/L    Chloride 107 98 - 107 mmol/L    Carbon Dioxide (CO2) 20 (L) 22 - 29 mmol/L    Anion Gap 14 7 - 15 mmol/L    Urea Nitrogen 10.1 6.0 - 20.0 mg/dL    Creatinine 0.74 0.67 - 1.17 mg/dL    GFR Estimate >90 >60 mL/min/1.73m2    Calcium 8.4 (L) 8.6 - 10.0 mg/dL    Glucose 72 70 - 99 mg/dL   Hepatic panel    Collection Time: 04/01/24 10:20 AM   Result Value Ref Range    Protein Total 9.2 (H) 6.4 - 8.3 g/dL    Albumin 3.9 3.5 - 5.2 g/dL    Bilirubin Total 1.8 (H) <=1.2 mg/dL    Alkaline Phosphatase 234 (H) 40 - 150 U/L     (HH) 0 - 45 U/L     (H) 0 - 70 U/L    Bilirubin Direct 1.08 (H) 0.00 - 0.30 mg/dL   Lipid Profile    Collection Time: 04/01/24 10:20 AM   Result Value Ref Range    Cholesterol 174 <200 mg/dL     Triglycerides 79 <150 mg/dL    Direct Measure HDL 49 >=40 mg/dL    LDL Cholesterol Calculated 109 (H) <=100 mg/dL    Non HDL Cholesterol 125 <130 mg/dL    Patient Fasting > 8hrs? Yes    N terminal pro BNP outpatient    Collection Time: 04/01/24 10:20 AM   Result Value Ref Range    N Terminal Pro BNP Outpatient 2,117 (H) 0 - 450 pg/mL   Lupus Anticoagulant Panel    Collection Time: 04/01/24 10:20 AM   Result Value Ref Range    Thrombin Time 18.8 13.0 - 19.0 Seconds    PTT Ratio 1.49 (H) <1.21    Platelet Neutralization -1 <=0 Seconds    PTT 1:2 MIX 42 31 - 45 Seconds    DRVVT Screen Ratio 0.95 <1.08    Lupus Result Negative Negative    Lupus Interpretation       INR is elevated.  APTT ratio is elevated.  Platelet Neutralization is negative.  APTT 1:2 Mix is normal.  DRVVT Screen ratio is normal.  Thrombin time is normal.  NEGATIVE TEST; A LUPUS ANTICOAGULANT WAS NOT DETECTED IN THIS SPECIMEN WITHIN THE LIMITS OF THE TESTING REPERTOIRE.  If the clinical picture is strongly suggestive of an antiphospholipid syndrome, recommend anticardiolipin and beta-2-glycoprotein (IgG and IgM) antibody tests.  Platelet Neutralization and APTT 1:2 Mix are suggestive of factor deficiency.   When the INR is elevated due to warfarin, factors 2, 7, 9, and 10 (factors II, VII, IX, and X) are expected to be decreased and may explain the mixing results. If the patient is not on warfarin, recommend checking factor levels.   If the patient is on an anticoagulant, recommend repeat testing without anticoagulant interference, as anticoagulation may cause false positive or false negative findings.  Clinical correlation is recommended.    Binta Saleh MD,  PhD  [facility]           CRP inflammation    Collection Time: 04/01/24 10:20 AM   Result Value Ref Range    CRP Inflammation 3.19 <5.00 mg/L   Iron and iron binding capacity    Collection Time: 04/01/24 10:20 AM   Result Value Ref Range    Iron 102 61 - 157 ug/dL    Iron Binding  Capacity 408 240 - 430 ug/dL    Iron Sat Index 25 15 - 46 %       Echocardiogram (February 2024):  1.Left ventricular size, wall motion and function are normal. The ejection fraction is 55-60%.  2.Flattened septum is consistent with RV pressure/volume overload.  3.Severely decreased right ventricular systolic function  4.The right atrium is severely dilated.  5.There is mod-severe to severe (3-4+) tricuspid regurgitation.  6.Right ventricular systolic pressure is elevated, consistent with moderate to  severe pulmonary hypertension.  There is no comparison study available.    6MWT (April 2024):  He walked for 495 m.  On room air his lowest oxygen saturation was 96%.    Assessment and Plan:     In summary, July Brian is an unfortunate 28-year-old male with past medical history significant for HIV and hepatitis infection was referred to us for further evaluation and management of pulm hypertension.    Clearly, he has severe pulmonary hypertension and RV dysfunction likely related to his HIV infection.  Clinically he is functional class II.  He is not in decompensated heart failure but his NT proBNP is significantly elevated.  His echocardiogram does shows severe right ventricular dilatation and dysfunction with underfilled LV which is very concerning.  I am very worried about him based on his right ventricular size and function.    Will schedule him to get a right heart catheterization with vasodilator's testing , pulmonary function test, and a VQ scan to complete the workup for pulm hypertension on an urgent basis.  I suspect that he will likely need upfront triple combination therapy.  Will decide on this after he completes the above workup.  Will also decide on the need for diuretics and digoxin after his right heart catheterization.  I am afraid that parenteral prostacyclin therapy would be difficult given his current social circumstances and language barrier.    He has established care for management of his HIV with  infectious disease.  He has a follow-up appointment with them on April 5.  He has been out of his antiretroviral therapy for the last 1 week.  He also has an appointment with gastroenterology for management of his hepatitis C infection.    I explained to him the seriousness of his illness and the necessity for completing the above testing and starting pulmonary vasodilator therapy as soon as possible. He verbalized understanding.     It was a pleasure seeing July Win at the [facility]. Please contact us with any questions or concerns that you may have. We thank you for involving us in this patients care.    Total time today was 80 minutes reviewing notes, imaging, labs, patient visit, orders and documentation     Sincerely,      Andreas Johns MD  Associate Professor of Medicine  [facility]  Heart Failure, Transplant, and Mechanical Circulatory Support Cardiology   Cardiovascular Division  [facility]   [phone]

## 2024-04-01 NOTE — TELEPHONE ENCOUNTER
4/1/2024  @ 2:03 PM -  sildenafil 20 mg (90/30) - new start     PA Initiation  Medication: sildenafil 20 mg (90/30) - new start  Insurance Company: [employer] - Phone [phone] Fax [phone]:  [facility]  Pharmacy Filling the Rx: [facility] - [place], [place] - [address]  Filling Pharmacy Phone:    Filling Pharmacy Fax:    Start Date: 4/1/2024  via [facility], key [id number], w/ RHC dated : 4/9/24 - signed: 4/10/24, Dx Code: I27.2 - secondary to HIV ;  SUBMITTED 4/10/24 AFTER RHC SIGNED.   ----------------------------  Insurance: MARK/ABHAY  BIN: [id number]  PCN: JEAN PIERRE  RX GRP#: [id number]  ID#: [id number]         Lisette SAHA CMA- Prior Auths  Cardiology/Pulmonary Hypertension

## 2024-04-01 NOTE — NURSING NOTE
Chief Complaint   Patient presents with    New Patient     New PH       Vitals were taken, medications reviewed.    Radha Campbell, EMT   10:44 AM

## 2024-04-02 ENCOUNTER — PATIENT OUTREACH (OUTPATIENT)
Dept: CARE COORDINATION | Facility: CLINIC | Age: 29
End: 2024-04-02
Payer: COMMERCIAL

## 2024-04-02 LAB
ANA SER QL IF: NEGATIVE
DRVVT SCREEN RATIO: 0.95
FIO2-PRE: 21 %
HIV 1+2 AB+HIV1P24 AG SERPLBLD IA.RAPID: ABNORMAL
HIV 2 AB SERPLBLD QL IA.RAPID: NONREACTIVE
HIV1 AB SERPLBLD QL IA.RAPID: REACTIVE
IL6 SERPL-MCNC: 6.57 PG/ML
LA PPP-IMP: NEGATIVE
LUPUS INTERPRETATION: ABNORMAL
PLATELET NEUTRALIZATION: -1 SECONDS
PTT 1:2 MIX: 42 SECONDS (ref 31–45)
PTT RATIO: 1.49
STFR SERPL-MCNC: 2.9 MG/L
THROMBIN TIME: 18.8 SECONDS (ref 13–19)

## 2024-04-02 NOTE — PROGRESS NOTES
Clinic Care Coordination Contact  Community Health Worker Follow Up    Care Gaps:   Health Maintenance Due   Topic Date Due    ADVANCE CARE PLANNING  Never done    COPD ACTION PLAN  Never done    MENINGITIS IMMUNIZATION (1 - Risk 2-dose series) Never done     PCP to address other medical care gaps with patient at the next follow up visit.    Care Plan:   Care Plan: Cardiology       Problem: Pulmonary hypertension       Goal: Patient will attend his cardiology clinic appointment in the next 6 months.       Start Date: 1/24/2024 Expected End Date: 7/31/2024    This Visit's Progress: 40% Recent Progress: 30%    Note:     Barriers: language barrier, low literacy, noncompliance, and lack of knowledge how to navigate complex health care system  Strengths: motivated to attend appt  Patient expressed understanding of goal: Yes    Action steps to achieve this goal:  1. I will answer my phone when I am contacted to schedule my appointment.  2. I will attend my ECHO appointment as scheduled 2/7/2024 at 9:00am. Completed.   3. I will attend my initial cardiology appointment on 4/1/2024 at 10:15am with Andreas Johns MD. Completed.  4. I will attend my appointment for ECHO as scheduled on 4/09/2024 at 11:00 AM.  5. I will follow up with CCC regarding this goal at each outreach until it is completed.     Per cardiology clinic protocol that the clinic will reach out to patient directly to schedule an appointment.                             Care Plan: Pulmonology       Problem: Pulmonary hypertension       Goal: Patient will attend his pulmonology clinic appointment in the next 6 months.       Start Date: 1/24/2024 Expected End Date: 7/31/2024    This Visit's Progress: 30% Recent Progress: 20%    Note:     Barriers: language barrier, low literacy, noncompliance, and lack of knowledge how to navigate complex health care system  Strengths: motivated to attend appt  Patient expressed understanding of goal: Yes    Action steps  to achieve this goal:  1. I will answer my phone when I am contacted to schedule my appointment.  2. I will attend my pulmonology appointment as scheduled on  2/22/24 at 2:45pm for PFT and 3:45pm with Dr Andrade. No showed  3. I will attend my rescheduled PFT and pulmonology appts on 4/09/2024.  4. I will schedule a follow up appointment with my pulmonologist if it is recommended to do so while I am at the clinic.  5. I will follow up with CCC regarding this goal at each outreach until it is completed.     Per [facility]ending [facility] to [facility] services, transportation will be arranged.                             Care Plan: ID clinic       Problem: ID clinic       Goal: Patient will attend ID clinic appointment as scheduled in the next 12 months.       Start Date: 3/28/2024 Expected End Date: 12/31/2024    This Visit's Progress: 20% Recent Progress: 10%    Note:     Barriers: language barrier, low literacy, noncompliance, and lack of knowledge how to navigate complex health care system  Strengths: motivated to attend appt  Patient expressed understanding of goal: Yes    Action steps to achieve this goal:  1. I will attend my follow-up ID clinic appointment as scheduled 4/4/24 at 9:05am with Augustine Tello MD. Transportation confirmed with [facility] and reminded patient.   2. I will schedule a follow up appointment with my provider if it is recommended to do so while I am at the clinic.  3. I will follow up with CCC regarding this goal at each outreach until it is completed.                             Care Plan: GI       Problem: Chronic hepatitis C without hepatic coma       Goal: Patient will attend GI clinic appointment in the next 12 months.       Start Date: 3/28/2024 Expected End Date: 12/31/2024    This Visit's Progress: 20% Recent Progress: 10%    Note:     Barriers: language barrier, low literacy, noncompliance, and lack of knowledge how to navigate complex health care system  Strengths: motivated to attend appt  Patient expressed  understanding of goal: Yes    Action steps to achieve this goal:  1. I will attend my GI appointment as scheduled on 5/1/2024 at 8:00am lab then 8:45 am with Dr Morales.  2. I will schedule a follow up appointment with my provider if it is recommended to do so while I am at the clinic.  3. I will follow up with CCC regarding this goal at each outreach until it is completed.                           Intervention and Education during outreach:  -All upcoming appointments list printed out and given to patient's [facility] worker who will help arranging transportation.   -Patient is receiving full supports from [facility] services.  -Patient and family are receiving [place] benefits as qualified.   -Patient no showed pulmonary and PFT appointments were rescheduled and seen by pulmonologist.   -Patient was informed to call with questions or concerns.     CHW Next Outreach: In one month.

## 2024-04-03 ENCOUNTER — TELEPHONE (OUTPATIENT)
Dept: CARDIOLOGY | Facility: CLINIC | Age: 29
End: 2024-04-03
Payer: COMMERCIAL

## 2024-04-03 NOTE — PROGRESS NOTES
Service Date: April 3, 2024    RE:  Carolina Torres   MRN:  [id number]  :  1995      Dear Dr. Dr. Du:    We had the pleasure of seeing Carolina Torres at the [facility]. Although you are familiar with this patient's history, please allow me to summarize it for the purpose of our records.    He is a 28-year-old male who recently immigrated [place] the [place] in [facility] this past January..  He is well originally from [place].  He speaks only Kost[place]. His past medical history is significant for HIV from IV drug use and pulmonary hypertension based on paperwork..  He has been on antiretroviral therapy for the last 3 years.  He is also reportedly on sildenafil 20 mg 3 times a day.      He establish care with Dr. Augustine Tello at [place] [facility].  His antiretroviral therapy was renewed.  On further testing he was noted to have hepatitis C coinfection with his HIV.  He has elevated liver enzymes.  He is referred to gastroenterology and waiting to see them.    He had a repeat echocardiogram that showed severe right ventricular dilatation with severely reduced right ventricular function.  He is now referred to our clinic to establish treatment for his pulmonary hypertension.  It is unclear whether he is still taking his sildenafil at home or not.    With respect to his symptoms, he does complain of exertional shortness of breath when he overexerts.  He does not have shortness of breath with his usual activities.  He notices shortness of breath only when he runs or plays soccer.  I would currently characterize him as functional class IIb.  He denies having any exertional chest pain or chest pressure.  No exertional presyncope or syncope.  He denies having any lower extremity swelling or abdominal distention.  No recent hospitalization or ER visits.       PAST MEDICAL HISTORY:  1.  HIV on antiretroviral therapy  2.  Recent diagnosis of hepatitis C  3.   Pulmonary hypertension unclear date of diagnosis currently on sildenafil monotherapy  4.  Alcohol use  5.  History of IV drug use currently sober/sustained remission  6.  Cannabis abuse      PAST SURGICAL HISTORY:  None    CURRENT MEDICATIONS:  Current Outpatient Medications   Medication Sig Dispense Refill    dolutegravir (TIVICAY) 50 MG tablet Take 50 mg by mouth daily Combination pill: Dolutegravir-Lamivudine-Tenofovir DF (Patient not taking: Reported on 4/1/2024)      lamiVUDine (EPIVIR) 150 MG tablet Take 300 mg by mouth daily Combination pill: Dolutegravir-Lamivudine-Tenofovir DF (Patient not taking: Reported on 4/1/2024)      sildenafil (VIAGRA) 100 MG tablet Take 100 mg by mouth daily as needed Takes 1/4 of a tab 3 x's daily (Patient not taking: Reported on 4/1/2024)      tenofovir (VIREAD) 300 MG tablet Take 300 mg by mouth daily Combination pill: Dolutegravir-Lamivudine-Tenofovir DF (Patient not taking: Reported on 4/1/2024)       No current facility-administered medications for this visit.       ROS:   10 point ROS negative except as discussed in above HPI.    SOCIAL HISTORY:  He is currently not working.  He is looking for jobs.  He is  and living with his 3 kids.  His kids are 9-year, 6 years and 1-year-old.  His wife and one of his kids also have HIV infection.  He is a .  He drinks alcohol on a regular basis.  He also smokes on a daily basis.  He used to do IV drug in the past.  He has been under remission now.    FAMILY HISTORY:  None relevant    EXAM:  /86 (BP Location: Right arm, Patient Position: Sitting, Cuff Size: Adult Regular)   Pulse 87   Wt 69.9 kg (154 lb 1.6 oz)   SpO2 100%   BMI 25.52 kg/m    Awake, alert, and oriented x 3.   Comfortable. No apparent distress.  No pallor, cyanosis, or clubbing  Carotids 1+ + bilateral and pulse was regular in rhythm.  Jugular venous distention up to the angle of the jaw.  Heart: regular, normal S1/loud P2, no murmur, gallop,  rub  Lungs: NVBS and clear to auscultation bilaterally, no rales or wheezing  Abdomen: soft, non-tender, bowel sounds present, no hepatosplenomegaly  Extremities: no edema  Neurological: No gross focal neurological deficit    Labs:  Recent Results (from the past 168 hour(s))   6 minute walk test    Collection Time: 04/01/24 12:00 AM   Result Value Ref Range    6 min walk (FT) 1,625 1,807 ft    6 Min Walk (M) 495 551 m   General PFT Lab (Please always keep checked)    Collection Time: 04/01/24 10:16 AM   Result Value Ref Range    FIO2-Pre 21.00 %   Interleukin 6 Blood    Collection Time: 04/01/24 10:19 AM   Result Value Ref Range    Interleukin 6 Blood 6.57 (H) <3.01 pg/mL   CBC with platelets    Collection Time: 04/01/24 10:19 AM   Result Value Ref Range    WBC Count 4.9 4.0 - 11.0 10e3/uL    RBC Count 4.51 4.40 - 5.90 10e6/uL    Hemoglobin 14.7 13.3 - 17.7 g/dL    Hematocrit 44.7 40.0 - 53.0 %    MCV 99 78 - 100 fL    MCH 32.6 26.5 - 33.0 pg    MCHC 32.9 31.5 - 36.5 g/dL    RDW 15.4 (H) 10.0 - 15.0 %    Platelet Count 78 (L) 150 - 450 10e3/uL   INR    Collection Time: 04/01/24 10:19 AM   Result Value Ref Range    INR 1.20 (H) 0.85 - 1.15   Anti Nuclear Josefina IgG by IFA with Reflex    Collection Time: 04/01/24 10:19 AM   Result Value Ref Range    OUSMANE interpretation Negative Negative   Rheumatoid factor    Collection Time: 04/01/24 10:19 AM   Result Value Ref Range    Rheumatoid Factor <10 <14 IU/mL   TSH    Collection Time: 04/01/24 10:19 AM   Result Value Ref Range    TSH 2.06 0.30 - 4.20 uIU/mL   Hepatitis B core antibody    Collection Time: 04/01/24 10:19 AM   Result Value Ref Range    Hepatitis B Core Antibody Total Reactive (A) Nonreactive   Hepatitis B Surface Antibody    Collection Time: 04/01/24 10:19 AM   Result Value Ref Range    Hepatitis B Surface Antibody Reactive     Hepatitis B Surface Antibody Instrument Value 38.90 <8.5 m[IU]/mL   Hepatitis B surface antigen    Collection Time: 04/01/24 10:19 AM    Result Value Ref Range    Hepatitis B Surface Antigen Nonreactive Nonreactive   Hepatitis C antibody    Collection Time: 04/01/24 10:19 AM   Result Value Ref Range    Hepatitis C Antibody Reactive (A) Nonreactive   HIV Antigen Antibody Combo    Collection Time: 04/01/24 10:19 AM   Result Value Ref Range    HIV Antigen Antibody Combo Reactive (A) Nonreactive   Soluble transferrin receptor    Collection Time: 04/01/24 10:19 AM   Result Value Ref Range    Soluble Transferrin Receptor 2.9 2.2 - 5.0 mg/L   HIV 1/2 Supplemental Assay    Collection Time: 04/01/24 10:19 AM   Result Value Ref Range    HIV 1 Result Reactive (A) Nonreactive    HIV 2 Result Nonreactive Nonreactive    HIV Supplemental Interpretation HIV-1 POSITIVE    Basic metabolic panel    Collection Time: 04/01/24 10:20 AM   Result Value Ref Range    Sodium 141 135 - 145 mmol/L    Potassium 3.6 3.4 - 5.3 mmol/L    Chloride 107 98 - 107 mmol/L    Carbon Dioxide (CO2) 20 (L) 22 - 29 mmol/L    Anion Gap 14 7 - 15 mmol/L    Urea Nitrogen 10.1 6.0 - 20.0 mg/dL    Creatinine 0.74 0.67 - 1.17 mg/dL    GFR Estimate >90 >60 mL/min/1.73m2    Calcium 8.4 (L) 8.6 - 10.0 mg/dL    Glucose 72 70 - 99 mg/dL   Hepatic panel    Collection Time: 04/01/24 10:20 AM   Result Value Ref Range    Protein Total 9.2 (H) 6.4 - 8.3 g/dL    Albumin 3.9 3.5 - 5.2 g/dL    Bilirubin Total 1.8 (H) <=1.2 mg/dL    Alkaline Phosphatase 234 (H) 40 - 150 U/L     (HH) 0 - 45 U/L     (H) 0 - 70 U/L    Bilirubin Direct 1.08 (H) 0.00 - 0.30 mg/dL   Lipid Profile    Collection Time: 04/01/24 10:20 AM   Result Value Ref Range    Cholesterol 174 <200 mg/dL    Triglycerides 79 <150 mg/dL    Direct Measure HDL 49 >=40 mg/dL    LDL Cholesterol Calculated 109 (H) <=100 mg/dL    Non HDL Cholesterol 125 <130 mg/dL    Patient Fasting > 8hrs? Yes    N terminal pro BNP outpatient    Collection Time: 04/01/24 10:20 AM   Result Value Ref Range    N Terminal Pro BNP Outpatient 2,117 (H) 0 - 450 pg/mL    Lupus Anticoagulant Panel    Collection Time: 04/01/24 10:20 AM   Result Value Ref Range    Thrombin Time 18.8 13.0 - 19.0 Seconds    PTT Ratio 1.49 (H) <1.21    Platelet Neutralization -1 <=0 Seconds    PTT 1:2 MIX 42 31 - 45 Seconds    DRVVT Screen Ratio 0.95 <1.08    Lupus Result Negative Negative    Lupus Interpretation       INR is elevated.  APTT ratio is elevated.  Platelet Neutralization is negative.  APTT 1:2 Mix is normal.  DRVVT Screen ratio is normal.  Thrombin time is normal.  NEGATIVE TEST; A LUPUS ANTICOAGULANT WAS NOT DETECTED IN THIS SPECIMEN WITHIN THE LIMITS OF THE TESTING REPERTOIRE.  If the clinical picture is strongly suggestive of an antiphospholipid syndrome, recommend anticardiolipin and beta-2-glycoprotein (IgG and IgM) antibody tests.  Platelet Neutralization and APTT 1:2 Mix are suggestive of factor deficiency.   When the INR is elevated due to warfarin, factors 2, 7, 9, and 10 (factors II, VII, IX, and X) are expected to be decreased and may explain the mixing results. If the patient is not on warfarin, recommend checking factor levels.   If the patient is on an anticoagulant, recommend repeat testing without anticoagulant interference, as anticoagulation may cause false positive or false negative findings.  Clinical correlation is recommended.    Binta Saleh MD,  PhD  [facility]           CRP inflammation    Collection Time: 04/01/24 10:20 AM   Result Value Ref Range    CRP Inflammation 3.19 <5.00 mg/L   Iron and iron binding capacity    Collection Time: 04/01/24 10:20 AM   Result Value Ref Range    Iron 102 61 - 157 ug/dL    Iron Binding Capacity 408 240 - 430 ug/dL    Iron Sat Index 25 15 - 46 %       Echocardiogram (February 2024):  1.Left ventricular size, wall motion and function are normal. The ejection fraction is 55-60%.  2.Flattened septum is consistent with RV pressure/volume overload.  3.Severely decreased right ventricular systolic function  4.The right atrium  is severely dilated.  5.There is mod-severe to severe (3-4+) tricuspid regurgitation.  6.Right ventricular systolic pressure is elevated, consistent with moderate to  severe pulmonary hypertension.  There is no comparison study available.    6MWT (April 2024):  He walked for 495 m.  On room air his lowest oxygen saturation was 96%.    Assessment and Plan:     In summary, Carolina Torres is an unfortunate 28-year-old male with past medical history significant for HIV and hepatitis infection was referred to us for further evaluation and management of pulm hypertension.    Clearly, he has severe pulmonary hypertension and RV dysfunction likely related to his HIV infection.  Clinically he is functional class II.  He is not in decompensated heart failure but his NT proBNP is significantly elevated.  His echocardiogram does shows severe right ventricular dilatation and dysfunction with underfilled LV which is very concerning.  I am very worried about him based on his right ventricular size and function.    Will schedule him to get a right heart catheterization with vasodilator's testing , pulmonary function test, and a VQ scan to complete the workup for pulm hypertension on an urgent basis.  I suspect that he will likely need upfront triple combination therapy.  Will decide on this after he completes the above workup.  Will also decide on the need for diuretics and digoxin after his right heart catheterization.  I am afraid that parenteral prostacyclin therapy would be difficult given his current social circumstances and language barrier.    He has established care for management of his HIV with infectious disease.  He has a follow-up appointment with them on April 5.  He has been out of his antiretroviral therapy for the last 1 week.  He also has an appointment with gastroenterology for management of his hepatitis C infection.    I explained to him the seriousness of his illness and the necessity for completing the above  testing and starting pulmonary vasodilator therapy as soon as possible. He verbalized understanding.     It was a pleasure seeing July Win at the [facility]. Please contact us with any questions or concerns that you may have. We thank you for involving us in this patients care.    Total time today was 80 minutes reviewing notes, imaging, labs, patient visit, orders and documentation     Sincerely,      Andreas Johns MD  Associate Professor of Medicine  [facility]  Heart Failure, Transplant, and Mechanical Circulatory Support Cardiology   Cardiovascular Division  [facility]   [phone]

## 2024-04-03 NOTE — TELEPHONE ENCOUNTER
Cath Lab Case Request/Order    Location: [address] [address] [facility] Waiting Room    Procedure: Right Heart Cath (RHC)    Procedure Date: 4/9    Patient Arrival Time: 730 CSC  9 UU     Procedure Time: 5th case to follow    Ordering Provider: Dr. Andreas Johns    Performing Cardiologist: Dr. Andreas Johns    Inpatient Bed Needed: No    Post-  Procedure GONZALO appointment scheduled (1 - 2 weeks): N/A, RHC      Communicated Patient Instructions:     NPO, nothing to eat 8 hours and drink 2 hours before arrival time: No     , need to arrange a ride home - unable to drive post- procedure: No     Adult at home, need a responsible adult to stay with patient 24 hours post- procedure: N/A, RHC    Appointment was scheduled: Over the phone    Patient expressed understanding of above instructions and denied further questions at this time.    Ivon Guo

## 2024-04-04 ENCOUNTER — OFFICE VISIT (OUTPATIENT)
Dept: INFECTIOUS DISEASES | Facility: CLINIC | Age: 29
End: 2024-04-04
Payer: COMMERCIAL

## 2024-04-04 VITALS
WEIGHT: 151.5 LBS | BODY MASS INDEX: 25.09 KG/M2 | OXYGEN SATURATION: 99 % | SYSTOLIC BLOOD PRESSURE: 125 MMHG | DIASTOLIC BLOOD PRESSURE: 83 MMHG | HEART RATE: 51 BPM

## 2024-04-04 DIAGNOSIS — B20 HUMAN IMMUNODEFICIENCY VIRUS (HIV) DISEASE (H): Primary | ICD-10-CM

## 2024-04-04 PROCEDURE — G2211 COMPLEX E/M VISIT ADD ON: HCPCS | Performed by: INTERNAL MEDICINE

## 2024-04-04 PROCEDURE — 99213 OFFICE O/P EST LOW 20 MIN: CPT | Performed by: INTERNAL MEDICINE

## 2024-04-04 NOTE — PROGRESS NOTES
[facility] INFECTIOUS DISEASE CLINIC [facility]    Date: 04/04/2024   Patient Name: Carolina Torres   YOB: 1995  MRN: [id number]      ASSESSMENT:  28 year old man referred to ID clinic for HIV management.    HIV infection.  Diagnosed in [facility] within the past year.  Outside records show a CD4 count of 788 in July 2023.  Duration of infection unclear.  Has been on combination pill of dolutegravir, lamivudine, tenofovir DF for about a year.  Viral load undetectable and CD4 count 589 at initial visit. No pretreatment genotype available.  Patient reports acquisition from sharing needles.  Unknown history of opportunistic infections.  Reports okay adherence, missing a few doses every month.  Wife and 1 daughter are also HIV positive and on treatment.  TB screening completed. Negative quantiferon test. AFB sputum x 3 negative Sept 2023  Hepatitis C.  Presumed from IV drug use.  Genotype 3A.  Viral load 8,590,000.  Has referral placed to GI for May.  Hepatitis B surface antigen negative with immunity from previous infection (positive core and surface antibodies).  Also has documentation of vaccination on 4/5/2013, 6/6/2013, and 7/17/2023 hepatitis A vaccine x 1 on 2/1/2024.  Pulmonary hypertension.  Diagnosed prior to arrival.  Outpatient chest x-ray with cardiomegaly.  Seeing cardiology  Chronic tobacco use  History of polysubstance abuse, including IV drug use.  Reports sobriety    PLAN:  -start biktarvy 1 pill daily. Will send to specialty pharmacy for first fill  -Discussed coinfection with HIV and hepatitis C and the importance of making it to his GI appointment - appt reminder given for May 1 appt      Return to clinic in 3 months.    Augustine Tello MD  [facility]   Clinic phone: [phone]   Clinic fax: [phone]     ______________________________________________________________________    HISTORY OF PRESENT ILLNESS:   From initial visit on 2/29/24:    Carolina Torres is a 28 year  old man who is referred for evaluation of HIV.  Patient recently immigrated [place] [facility] in January of this year.  He was recently evaluated in primary care clinic and referred to ID due to a diagnosis of HIV.  He has extensive immigration records that are scanned and were reviewed today.  The patient has a known history of polysubstance abuse, including IV drug use.  He believes this is how he was infected with HIV.  Timeline of infection is unclear.  He denies any significant opportunistic infections in the past.  He does tell me that he was diagnosed over the past year and started on treatment.  He is on triple drug therapy.  He takes this regularly, but does miss occasional doses.  He is currently living with his wife and 3 children.  2 of his children are school-age and 1 is 16 months old.  His wife and middle daughter are both diagnosed with HIV and currently on treatment.  Today he has no acute complaints.  He does not have any questions regarding HIV diagnosis.  He knows that he needs to stay on his medications.  He does have a lesion on his nose that has been present for about a week.  He says that this started out as a pimple and is now draining.  He is not taking any medicine for this.    Outside records were reviewed which show workup for tuberculosis with sputum AFB smear and culture x 3 in September 2023.  Smear and cultures were negative.  He has a recent chest x-ray which indicates cardiomegaly without any other report of cavitary lesions or infiltrates.  He is known to be positive for hepatitis C.  HIV test was positive on 7/17/2023 (unknown if there were prior positive test).  Additional labs in July showed negative hepatitis B surface antigen, negative treponemal antibody.  An ultrasound of the liver showed enlarged liver smooth surface with normal parenchymal echogenicity without gross focal lesion.  Enlarged intrahepatic IVC and hepatic vein.  No intrahepatic bile duct dilation is observed.   The visualized proximal CBD is measured about 0.3 cm.  Gallbladder is well-distended without gross stone.  Diffuse gallbladder wall thickening is noted.  The visualized part of pancreas appeared normal.  Spleen is measured about 11 cm in length.  Both kidneys are of normal size and echotexture.  Small ascites is noted.    Additional labs done following moved to [place] include negative QuantiFERON test, negative treponemal antibody, negative hepatitis B surface antigen, positive hepatitis B core and surface antibody, positive hepatitis C antibody with elevated viral load, genotype 3A.  Normal kidney and liver testing (mildly elevated AST)      Interval History:  Here for follow-up. nose cellulitis healed. No new complaints. Recently established with cardiology for pulmonary hypertension. Ran out of his HIV meds since last visit, doesn't remember how long ago. Discussed order of new meds.     Seen with phone     Review of Systems:  No fevers or chills      Past Medical History:  Past Medical History:   Diagnosis Date    Alcohol dependence (H)     Cannabis abuse     Current smoker     Hepatitis C     Human immunodeficiency virus (HIV) disease (H) 2019    upon arrival is on Dolutegravir/Lamivudine/Tenovir 50/300/300  one daily; CD4 788 (7/23)    Opioid use disorder, moderate, in sustained remission (H)     diagnosis [place] in [place] 1/[place]24    Other ascites     Pulmonary hypertension (H)     severely impaired right ventricular systolic function    Reaction to QuantiFERON-TB test (QFT) without active tuberculosis     class B1 upon [place] to [place]    Tattoos     right hand, left leg       Past Surgical History:  No past surgical history on file.    Allergies:  No Known Allergies    Medications:    Current Outpatient Medications:     dolutegravir (TIVICAY) 50 MG tablet, Take 50 mg by mouth daily Combination pill: Dolutegravir-Lamivudine-Tenofovir DF (Patient not taking: Reported on 4/1/2024), Disp: ,  Rfl:     lamiVUDine (EPIVIR) 150 MG tablet, Take 300 mg by mouth daily Combination pill: Dolutegravir-Lamivudine-Tenofovir DF (Patient not taking: Reported on 2024), Disp: , Rfl:     sildenafil (VIAGRA) 100 MG tablet, Take 100 mg by mouth daily as needed Takes 1/4 of a tab 3 x's daily (Patient not taking: Reported on 2024), Disp: , Rfl:     tenofovir (VIREAD) 300 MG tablet, Take 300 mg by mouth daily Combination pill: Dolutegravir-Lamivudine-Tenofovir DF (Patient not taking: Reported on 2024), Disp: , Rfl:     Social History:  Social History     Socioeconomic History    Marital status:      Spouse name: Not on file    Number of children: Not on file    Years of education: Not on file    Highest education level: Not on file   Occupational History    Not on file   Tobacco Use    Smoking status: Every Day     Types: Cigarettes     Passive exposure: Current    Smokeless tobacco: Current     Types: Chew    Tobacco comments:     Betel nut with tobacco    Vaping Use    Vaping Use: Never used   Substance and Sexual Activity    Alcohol use: Not on file    Drug use: Not on file    Sexual activity: Not on file   Other Topics Concern    Not on file   Social History Narrative    As of 2024        [place]: 20[place]        Marrital status:  to  Henrik Griffin ( 1997) as of         Living situation: lives in an apartment with spouse and 3 kids            Languages spoken: Jac Arthur            Past employment: farmer            Place of birth: [facility]    Years in a [place]: [place] (all [place]        Family not in [place]:     Family [place]:         Education previous [place] the [place]: [place] 2    Education started in the [place]:none            Pentecostal: not asked        A#: [phone]         Social Determinants of Health     Financial Resource Strain: Low Risk  (2024)    Financial Resource Strain     Within the past 12 months, have you or your family  members you live with been unable to get utilities (heat, electricity) when it was really needed?: No   Food Insecurity: Low Risk  (1/24/2024)    Food Insecurity     Within the past 12 months, did you worry that your food would run out before you got money to buy more?: No     Within the past 12 months, did the food you bought just not last and you didn t have money to get more?: No   Transportation Needs: Low Risk  (1/24/2024)    Transportation Needs     Within the past 12 months, has lack of transportation kept you from medical appointments, getting your medicines, non-medical meetings or appointments, work, or from getting things that you need?: No   Physical Activity: Not on file   Stress: Not on file   Social Connections: Not on file   Interpersonal Safety: Low Risk  (1/24/2024)    Interpersonal Safety     Do you feel physically and emotionally safe where you currently live?: Yes     Within the past 12 months, have you been hit, slapped, kicked or otherwise physically hurt by someone?: No     Within the past 12 months, have you been humiliated or emotionally abused in other ways by your partner or ex-partner?: No   Housing Stability: Low Risk  (1/24/2024)    Housing Stability     Do you have housing? : Yes     Are you worried about losing your housing?: No        PHYSICAL EXAM:    /83 (BP Location: Right arm, Patient Position: Sitting, Cuff Size: Adult Regular)   Pulse 51   Wt 68.7 kg (151 lb 8 oz)   SpO2 99%   BMI 25.09 kg/m      GENERAL:  well-developed, well-nourished, in no acute distress.   HENT:  Head is normocephalic, atraumatic. Oropharynx is moist without exudates or ulcers.  No thrush.  Terrible dentition.  Healed nose lesion  EYES:  Eyes have anicteric sclerae without conjunctival injection or stigmata of endocarditis.    NECK:  Supple.   LUNGS:  Clear to auscultation.  CARDIOVASCULAR:  Regular rate and rhythm with no murmurs, gallops or rubs.  ABDOMEN:  Normal bowel sounds, soft,  nontender.  Liver edge felt 1-2 inches below the costal margin  MUSCULOSKELETAL: Extremities warm and without edema. No joint swelling.  SKIN:  No acute rashes. No stigmata of endocarditis.  NEUROLOGIC: Grossly nonfocal. Normal gait and station.          Pertinent labs:    Lab Results   Component Value Date     04/01/2024    POTASSIUM 3.6 04/01/2024    CHLORIDE 107 04/01/2024    CO2 20 (L) 04/01/2024    BUN 10.1 04/01/2024    CR 0.74 04/01/2024    GLC 72 04/01/2024       Lab Results   Component Value Date    WBC 4.9 04/01/2024    HGB 14.7 04/01/2024    HCT 44.7 04/01/2024    PLT 78 (L) 04/01/2024    MCV 99 04/01/2024    RDW 15.4 (H) 04/01/2024        Lab Results   Component Value Date    BILITOTAL 1.8 (H) 04/01/2024     (HH) 04/01/2024     (H) 04/01/2024    PROTTOTAL 9.2 (H) 04/01/2024    ALBUMIN 3.9 04/01/2024    ALKPHOS 234 (H) 04/01/2024    INR 1.20 (H) 04/01/2024            MICROBIOLOGY DATA:  None    RADIOLOGY:  Reviewed      Attestation:  Total time preparing to see this patient, face-to-face time, and coordinating care time on the same calendar date: 20 minutes.  Face-face time: 8 minutes.  Over 50% of face-to-face time was spent in counseling/coordination of care.     The longitudinal plan of care for the diagnosis(es)/condition(s) as documented were addressed during this visit. Due to the added complexity in care, I will continue to support July in the subsequent management and with ongoing continuity of care.

## 2024-04-08 NOTE — TELEPHONE ENCOUNTER
LM with  that I was calling to review pre-procedure instructions for tomorrow's testing.  He should call me back at [phone].  Yaritza Bull RN on 4/8/2024 at 10:11 AM    Unclear if patient has transportation as well.

## 2024-04-09 ENCOUNTER — HOSPITAL ENCOUNTER (OUTPATIENT)
Dept: CARDIOLOGY | Facility: CLINIC | Age: 29
Discharge: HOME | End: 2024-04-09
Attending: INTERNAL MEDICINE | Admitting: INTERNAL MEDICINE
Payer: COMMERCIAL

## 2024-04-09 ENCOUNTER — HOSPITAL ENCOUNTER (OUTPATIENT)
Dept: NUCLEAR MEDICINE | Facility: CLINIC | Age: 29
Setting detail: NUCLEAR MEDICINE
Discharge: HOME | End: 2024-04-09
Attending: INTERNAL MEDICINE | Admitting: INTERNAL MEDICINE
Payer: COMMERCIAL

## 2024-04-09 ENCOUNTER — HOSPITAL ENCOUNTER (OUTPATIENT)
Facility: CLINIC | Age: 29
Discharge: HOME | End: 2024-04-09
Attending: INTERNAL MEDICINE | Admitting: INTERNAL MEDICINE
Payer: COMMERCIAL

## 2024-04-09 ENCOUNTER — APPOINTMENT (OUTPATIENT)
Dept: MEDSURG UNIT | Facility: CLINIC | Age: 29
End: 2024-04-09
Attending: INTERNAL MEDICINE
Payer: COMMERCIAL

## 2024-04-09 ENCOUNTER — OFFICE VISIT (OUTPATIENT)
Dept: PULMONOLOGY | Facility: CLINIC | Age: 29
End: 2024-04-09
Attending: INTERNAL MEDICINE
Payer: COMMERCIAL

## 2024-04-09 ENCOUNTER — TELEPHONE (OUTPATIENT)
Dept: CARDIOLOGY | Facility: CLINIC | Age: 29
End: 2024-04-09

## 2024-04-09 ENCOUNTER — HOSPITAL ENCOUNTER (OUTPATIENT)
Dept: GENERAL RADIOLOGY | Facility: CLINIC | Age: 29
Discharge: HOME | End: 2024-04-09
Attending: INTERNAL MEDICINE | Admitting: INTERNAL MEDICINE
Payer: COMMERCIAL

## 2024-04-09 ENCOUNTER — APPOINTMENT (OUTPATIENT)
Dept: LAB | Facility: CLINIC | Age: 29
End: 2024-04-09
Attending: INTERNAL MEDICINE
Payer: COMMERCIAL

## 2024-04-09 VITALS
TEMPERATURE: 99.2 F | OXYGEN SATURATION: 99 % | BODY MASS INDEX: 24.56 KG/M2 | WEIGHT: 148.3 LBS | DIASTOLIC BLOOD PRESSURE: 94 MMHG | RESPIRATION RATE: 18 BRPM | SYSTOLIC BLOOD PRESSURE: 135 MMHG | HEART RATE: 90 BPM

## 2024-04-09 DIAGNOSIS — R06.09 DOE (DYSPNEA ON EXERTION): ICD-10-CM

## 2024-04-09 DIAGNOSIS — I27.20 PULMONARY HYPERTENSION (H): ICD-10-CM

## 2024-04-09 DIAGNOSIS — I27.20 PULMONARY HTN (H): ICD-10-CM

## 2024-04-09 DIAGNOSIS — I27.20 PULMONARY HYPERTENSION (H): Primary | ICD-10-CM

## 2024-04-09 LAB
ALBUMIN SERPL BCG-MCNC: 3.6 G/DL (ref 3.5–5.2)
ALP SERPL-CCNC: 191 U/L (ref 40–150)
ALT SERPL W P-5'-P-CCNC: 95 U/L (ref 0–70)
ANION GAP SERPL CALCULATED.3IONS-SCNC: 12 MMOL/L (ref 7–15)
AST SERPL W P-5'-P-CCNC: 197 U/L (ref 0–45)
BASOPHILS # BLD AUTO: 0.1 10E3/UL (ref 0–0.2)
BASOPHILS NFR BLD AUTO: 1 %
BILIRUB SERPL-MCNC: 2.6 MG/DL
BUN SERPL-MCNC: 10.6 MG/DL (ref 6–20)
CALCIUM SERPL-MCNC: 8.8 MG/DL (ref 8.6–10)
CHLORIDE SERPL-SCNC: 103 MMOL/L (ref 98–107)
CREAT SERPL-MCNC: 0.82 MG/DL (ref 0.67–1.17)
DEPRECATED HCO3 PLAS-SCNC: 22 MMOL/L (ref 22–29)
EGFRCR SERPLBLD CKD-EPI 2021: >90 ML/MIN/1.73M2
EOSINOPHIL # BLD AUTO: 0.2 10E3/UL (ref 0–0.7)
EOSINOPHIL NFR BLD AUTO: 3 %
ERYTHROCYTE [DISTWIDTH] IN BLOOD BY AUTOMATED COUNT: 15.9 % (ref 10–15)
GLUCOSE SERPL-MCNC: 98 MG/DL (ref 70–99)
HCT VFR BLD AUTO: 41.4 % (ref 40–53)
HGB BLD-MCNC: 13.8 G/DL (ref 13.3–17.7)
IMM GRANULOCYTES # BLD: 0 10E3/UL
IMM GRANULOCYTES NFR BLD: 0 %
INR PPP: 1.21 (ref 0.85–1.15)
LVEF ECHO: NORMAL
LYMPHOCYTES # BLD AUTO: 2.2 10E3/UL (ref 0.8–5.3)
LYMPHOCYTES NFR BLD AUTO: 43 %
MCH RBC QN AUTO: 33.7 PG (ref 26.5–33)
MCHC RBC AUTO-ENTMCNC: 33.3 G/DL (ref 31.5–36.5)
MCV RBC AUTO: 101 FL (ref 78–100)
MONOCYTES # BLD AUTO: 0.5 10E3/UL (ref 0–1.3)
MONOCYTES NFR BLD AUTO: 9 %
NEUTROPHILS # BLD AUTO: 2.2 10E3/UL (ref 1.6–8.3)
NEUTROPHILS NFR BLD AUTO: 44 %
NRBC # BLD AUTO: 0 10E3/UL
NRBC BLD AUTO-RTO: 0 /100
NT-PROBNP SERPL-MCNC: 4948 PG/ML (ref 0–450)
PLATELET # BLD AUTO: 67 10E3/UL (ref 150–450)
POTASSIUM SERPL-SCNC: 4.3 MMOL/L (ref 3.4–5.3)
PROT SERPL-MCNC: 8.3 G/DL (ref 6.4–8.3)
RBC # BLD AUTO: 4.09 10E6/UL (ref 4.4–5.9)
SODIUM SERPL-SCNC: 137 MMOL/L (ref 135–145)
WBC # BLD AUTO: 5.1 10E3/UL (ref 4–11)

## 2024-04-09 PROCEDURE — 78582 LUNG VENTILAT&PERFUS IMAGING: CPT | Mod: 26 | Performed by: RADIOLOGY

## 2024-04-09 PROCEDURE — 999N000132 HC STATISTIC PP CARE STAGE 1

## 2024-04-09 PROCEDURE — 250N000009 HC RX 250: Performed by: INTERNAL MEDICINE

## 2024-04-09 PROCEDURE — 71046 X-RAY EXAM CHEST 2 VIEWS: CPT

## 2024-04-09 PROCEDURE — C1894 INTRO/SHEATH, NON-LASER: HCPCS | Performed by: INTERNAL MEDICINE

## 2024-04-09 PROCEDURE — 94726 PLETHYSMOGRAPHY LUNG VOLUMES: CPT | Performed by: INTERNAL MEDICINE

## 2024-04-09 PROCEDURE — 93451 RIGHT HEART CATH: CPT | Mod: 26 | Performed by: INTERNAL MEDICINE

## 2024-04-09 PROCEDURE — 999N000142 HC STATISTIC PROCEDURE PREP ONLY

## 2024-04-09 PROCEDURE — 80053 COMPREHEN METABOLIC PANEL: CPT | Performed by: INTERNAL MEDICINE

## 2024-04-09 PROCEDURE — 93451 RIGHT HEART CATH: CPT | Performed by: INTERNAL MEDICINE

## 2024-04-09 PROCEDURE — 999N000208 ECHOCARDIOGRAM COMPLETE

## 2024-04-09 PROCEDURE — 94375 RESPIRATORY FLOW VOLUME LOOP: CPT | Performed by: INTERNAL MEDICINE

## 2024-04-09 PROCEDURE — 85004 AUTOMATED DIFF WBC COUNT: CPT | Performed by: INTERNAL MEDICINE

## 2024-04-09 PROCEDURE — 71046 X-RAY EXAM CHEST 2 VIEWS: CPT | Mod: 26 | Performed by: RADIOLOGY

## 2024-04-09 PROCEDURE — A9540 TC99M MAA: HCPCS | Performed by: INTERNAL MEDICINE

## 2024-04-09 PROCEDURE — 272N000001 HC OR GENERAL SUPPLY STERILE: Performed by: INTERNAL MEDICINE

## 2024-04-09 PROCEDURE — 85610 PROTHROMBIN TIME: CPT | Performed by: INTERNAL MEDICINE

## 2024-04-09 PROCEDURE — 78582 LUNG VENTILAT&PERFUS IMAGING: CPT

## 2024-04-09 PROCEDURE — 83880 ASSAY OF NATRIURETIC PEPTIDE: CPT | Performed by: INTERNAL MEDICINE

## 2024-04-09 PROCEDURE — 93463 DRUG ADMIN & HEMODYNMIC MEAS: CPT | Performed by: INTERNAL MEDICINE

## 2024-04-09 PROCEDURE — C1751 CATH, INF, PER/CENT/MIDLINE: HCPCS | Performed by: INTERNAL MEDICINE

## 2024-04-09 PROCEDURE — 36415 COLL VENOUS BLD VENIPUNCTURE: CPT | Performed by: INTERNAL MEDICINE

## 2024-04-09 PROCEDURE — 343N000001 HC RX 343: Performed by: INTERNAL MEDICINE

## 2024-04-09 PROCEDURE — 272N000035 NM LUNG SCAN VENTILATION AND PERFUSION

## 2024-04-09 PROCEDURE — 93306 TTE W/DOPPLER COMPLETE: CPT | Mod: 26 | Performed by: STUDENT IN AN ORGANIZED HEALTH CARE EDUCATION/TRAINING PROGRAM

## 2024-04-09 PROCEDURE — 999N000054 HC STATISTIC EKG NON-CHARGEABLE

## 2024-04-09 PROCEDURE — A9567 TECHNETIUM TC-99M AEROSOL: HCPCS | Performed by: INTERNAL MEDICINE

## 2024-04-09 PROCEDURE — 94729 DIFFUSING CAPACITY: CPT | Performed by: INTERNAL MEDICINE

## 2024-04-09 RX ORDER — LIDOCAINE 40 MG/G
CREAM TOPICAL
Status: COMPLETED | OUTPATIENT
Start: 2024-04-09 | End: 2024-04-09

## 2024-04-09 RX ADMIN — LIDOCAINE: 40 CREAM TOPICAL at 12:12

## 2024-04-09 RX ADMIN — KIT FOR THE PREPARATION OF TECHNETIUM TC 99M ALBUMIN AGGREGATED 7 MILLICURIE: 2.5 INJECTION, POWDER, FOR SOLUTION INTRAVENOUS at 09:47

## 2024-04-09 RX ADMIN — KIT FOR THE PREPARATION OF TECHNETIUM TC 99M PENTETATE 2 MILLICURIE: 20 INJECTION, POWDER, LYOPHILIZED, FOR SOLUTION INTRAVENOUS; RESPIRATORY (INHALATION) at 09:28

## 2024-04-09 ASSESSMENT — ACTIVITIES OF DAILY LIVING (ADL)
ADLS_ACUITY_SCORE: 35

## 2024-04-09 NOTE — TELEPHONE ENCOUNTER
4/9/2024  @ 3:39 PM -  Tyvaso DPI (16/32/48 mcg titr kit) - new start - SUBMITTED 4/10/24 AFTER RHC SIGNED.        PA Initiation  Medication: Tyvaso DPI (16/32/48 mcg titr kit) - new start   Insurance Company: [employer] - Phone [phone] Fax [phone]:  [facility]  Pharmacy Filling the Rx: [facility] [address]  Filling Pharmacy Phone:    Filling Pharmacy Fax:    Start Date: 4/9/2024  via [facility], key [id number], w/ RHC dated : 4/9/24; Dx Code: I27.2 secondary to HIV.      ----------------------------  Insurance: MARK/ABHAY  BIN: [id number]   PCN: JEAN PIERRE  RX GRP#: [id number]  ID#: [id number]        Lisette SAHA CMA- Prior Auths  Cardiology/Pulmonary Hypertension

## 2024-04-09 NOTE — TELEPHONE ENCOUNTER
Ana Ojeda, Pratik Francois, JOVANI; Ana Ojeda, JOVANI; Jud hKanna, JOVANI; Yaritza Bull, JOVANI; Lisette Matt CMA Hello, Thenappan Thenappan, MD would like patient to start on :   Tyvaso DPI (trepostinil) - [: [employer]].    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      Lisette: RHC completed today. Notes will be in shortly. OK to move forward with sildenafil, ambrisentan per plan exclusion.    PA for Tyvaso next      Thanks  Ana Ojeda RN        ----- Message from Andreas Johns MD sent at 4/9/2024  2:41 PM CDT -----  Team,    I saw this patient again in the Cath Lab today.  He has completed this testing.  He has severe pulmonary arterial hypertension associate with HIV.  He is really sick.  I am worried.    Ideally we should start him on parenteral prostacyclin therapy.  However given his language barrier I am worried about starting him on parenteral prostacyclin therapy.  Will start with 2 oral drugs and inhaled treprostinil therapy.    Plan  1.  Sildenafil 20 mg 3 times a day  2.  Ambrisentan 5 mg daily increase it to 10 mg in a month  3.  Inhaled treprostinil DPI start at 16 mcg 4 times a day and increase it as tolerated to maximum of 64 mcg 4 times daily on a weekly basis  4.  Digoxin to 50 mcg p.o. daily  5.  Furosemide 20 mg daily  6.  Return to clinic in 4 weeks after starting sildenafil and Ambrisentan with routine labs    Please let me know if any questions thank you    Sincerely,  Andreas Johns MD   [facility] Pulmonary [facility]pertension  Heart Failure, Transplant, and Mechanical Circulatory Support Cardiology   Cardiovascular Division  [facility]   [phone]

## 2024-04-09 NOTE — PROGRESS NOTES
Dear Dr. Du,    We had the pleasure of seeing Mr. Carolina Torres for follow-up in our pulmonary hypertension hemodynamic lab.  He is a 28-year-old male who recently immigrated [place] the [place] in [facility].  His past medical history significant for HIV and pulm hypertension.  He was recently also noted to be positive for hepatitis C infection.    He we saw him in clinic last week.  We recommended him to complete the testing for pulm hypertension which he did today.    His VQ scan showed no evidence of chronic thromboembolic pulm hypertension.  It was low probability for chronic thromboembolic disease.    His pulmonary function test showed an FVC of 94%, FEV1 of 91%, FEV1 by FVC of 82%, TLC of 107% predicted and DLCO of 71% predicted.    Serological testing for HIV came back positive.  His hepatitis C is also positive.  His OUSMANE and rheumatoid factor were negative.    His NT proBNP is significantly elevated.  His LFTs are abnormal given his positive hepatitis C infection.  His renal function is otherwise unremarkable.  His CBC revealed thrombocytopenia with a platelet count of 67,000.    His EKG today showed sinus rhythm, right bundle branch block, and right axis deviation.    His right heart catheterization revealed an RA pressure of 15, RV of 74/15, PA of 74/28 with a mean of 45, pulmonary capillary wedge pressure of 8, PA saturation of 46.5, thermodilution cardiac output of 3.75 with an index of 2.2, measured Mora cardiac output of 3.2 with an index of 1.9 and PVR of 11.84 Wood units.      He had acute vasodilator testing with inhaled nitric oxide 80 ppm.  With this his PA pressure was 66/36 with a mean of 47, pulm capital wedge pressure of 8, RA pressure of 15, PA saturation of 46.8, thermodilution cardiac output of 4.2 with an index of 2.4 measured Mora cardiac output of 3.6 with an index of 2.1, and PVR of 10.93 Wood units.    Assessment and Plan  In summary he is a 28-year-old male with  HIV, pulmonary arterial hypertension, and hepatitis C infection who is establishing care with us.    Impression  #1 severe pulmonary arterial hypertension  #2 severely reduced right ventricular systolic function  #3 not in decompensated right heart failure functional class II  #4 intermediate risk by ESC ERS as well as REVEAL risk score    He has several factors that are concerning.  His NT proBNP is significantly elevated.  His right ventricle is severely dilated with severely reduced right ventricle function.  Cardiac index is moderately reduced.  However he is still functional class II.  His 6-minute walk distance is more than 440 m.  His revealed risk score as well as ESC ERS risk for suggest that he is at intermediate risk.  Parenteral prostacyclin therapy would be the best therapy however I am afraid that this would be too much for him given his current social circumstances especially with the language barrier.    As he is intermediate risk, we will start with upfront triple combination therapy with sildenafil 20 mg 3 times a day, Ambrisentan 10 mg daily, and inhaled treprostinil dry powder inhaler or nebulizer.  Will start him at 16 mcg 4 times a day and increase it gradually to a maximum of 64 mcg 4 times a day.  Will also start him on digoxin to 50 mcg daily for RV inotropic support.  Will start him on furosemide 20 mg daily.  He does not need supplemental oxygen.  No indication for anticoagulation therapy as this pulmonary arterial hypertension is due to HIV.    I explained the seriousness of his illness and overall poor prognosis if untreated.  I explained to him that his overall survival would be 2 to 3 years if he does not take his pulmonary vasodilator therapy.  He verbalized understanding through .  Also, he understands that there is no acute and he needs to be on lifelong medication with very close follow-up and monitoring in our clinic.      It was a pleasure meeting  July Brian in our  pulm hypertension hemodynamic lab.  Will keep you apprised of his response to treatment.  We thank you for involving us in his care.  Please do not hesitate to call us in the interim if any further questions.    Total time today was 50 minutes reviewing notes, imaging, labs, patient visit, orders and documentation.    Sincerely  Andreas Johns MD   [facility] Pulmonary [facility]pertension  Heart Failure, Transplant, and Mechanical Circulatory Support Cardiology   Cardiovascular Division  [facility]   [phone]

## 2024-04-09 NOTE — DISCHARGE INSTRUCTIONS
[facility]                        Interventional Cardiology  Discharge Instructions   Post Right Heart Cath and/or Heart Biopsy      AFTER YOU GO HOME:  DO drink plenty of fluids  DO resume your regular diet and medications unless otherwise instructed by your Primary Physician  Do Not scrub the procedure site vigorously  No lotion or powder to the puncture site for 3 days    CALL YOUR PRIMARY PHYSICIAN IF: You may resume all normal activity.  Monitor neck site for bleeding, swelling, or voice changes. If you notice bleeding or swelling immediately apply pressure to the site and call number below to speak with Cardiology Fellow.  If you experience any changes in your breathing you should call your doctor immediately or come to the closest Emergency Department.  Do not drive yourself.    ADDITIONAL INSTRUCTIONS: Medications: You are to resume all home medications including anticoagulation therapy unless otherwise advised by your primary cardiologist or nurse coordinator.    Follow Up: Per your primary cardiology team    If you have any questions or concerns regarding your procedure site please call [phone] at anytime and ask for Cardiology Fellow on call.  They are available 24 hours a day.  You may also contact the Cardiology Clinic after hours number at [phone].                                                       Telephone Numbers [phone] Monday-Friday 8:00 am to 4:30 pm    [phone] After 4:30 pm Monday-Friday, Weekends & Holidays  Ask for Interventional Cardiologist on call. Someone is on call 24 hours/day   [facility] toll free number [phone] Monday-Friday 8:00 am to 4:30 pm   [facility] Emergency Dept [phone]

## 2024-04-09 NOTE — PROGRESS NOTES
AVSS, ambulated with this RN, denies pain. Discharge instructions provided and all questions answered prior to discharge.

## 2024-04-09 NOTE — PROGRESS NOTES
Pt arrived to 2A for RHC. AVSS. Prep complete, LMX applied to R neck, IV in place from previous procedure. Awaiting consent at this time.

## 2024-04-09 NOTE — TELEPHONE ENCOUNTER
4/11/2024  @ 11:04 AM -  Tyvaso DPI - faxed to  [facility] (PH: [phone]) :               Lisette SAHA CMA- Prior Auths  Cardiology/Pulmonary Hypertension       4/9/2024  @ 3:39 PM -  Tyvaso DPI (16/32/48 mcg titr kit) - new start - **enrollment pending      Lisette SAHA CMA- Prior Auths  Cardiology/Pulmonary Hypertension

## 2024-04-09 NOTE — TELEPHONE ENCOUNTER
----- Message from Ana Ojeda RN sent at 4/9/2024  2:55 PM CDT -----    ----- Message -----  From: Andreas Johns MD  Sent: 4/9/2024   2:44 PM CDT  To: Lisette Matt CMA; Cardiology Ph Nurse-    Team,    I saw this patient again in the Cath Lab today.  He has completed this testing.  He has severe pulmonary arterial hypertension associate with HIV.  He is really sick.  I am worried.    Ideally we should start him on parenteral prostacyclin therapy.  However given his language barrier I am worried about starting him on parenteral prostacyclin therapy.  Will start with 2 oral drugs and inhaled treprostinil therapy.    Plan  1.  Sildenafil 20 mg 3 times a day  2.  Ambrisentan 5 mg daily increase it to 10 mg in a month  3.  Inhaled treprostinil DPI start at 16 mcg 4 times a day and increase it as tolerated to maximum of 64 mcg 4 times daily on a weekly basis  4.  Digoxin to 50 mcg p.o. daily  5.  Furosemide 20 mg daily  6.  Return to clinic in 4 weeks after starting sildenafil and Ambrisentan with routine labs    Please let me know if any questions thank you    Sincerely,  Andreas Johns MD   Center for Pulmonary Hypertension  Heart Failure, Transplant, and Mechanical Circulatory Support Cardiology   Cardiovascular Division  [facility]   [phone]

## 2024-04-09 NOTE — PROGRESS NOTES
Pt arrived back from CCL. Pt alert and oriented. VSS. Sats >92% on RA. Right neck site WNL. No bleeding or hematoma. Primapore in place. Awaiting MD to come speak to pt at bedside.

## 2024-04-09 NOTE — Clinical Note
dry, intact, no bleeding and no hematoma. 7fr RIJ sheath removed, manual pressure to hemostasis. Band-Aid

## 2024-04-10 LAB
ATRIAL RATE - MUSE: 77 BPM
DIASTOLIC BLOOD PRESSURE - MUSE: NORMAL MMHG
DLCOUNC-%PRED-PRE: 71 %
DLCOUNC-PRE: 21.67 ML/MIN/MMHG
DLCOUNC-PRED: 30.18 ML/MIN/MMHG
ERV-%PRED-PRE: 16 %
ERV-PRE: 0.28 L
ERV-PRED: 1.64 L
EXPTIME-PRE: 5.28 SEC
FEF2575-%PRED-PRE: 86 %
FEF2575-PRE: 3.53 L/SEC
FEF2575-PRED: 4.11 L/SEC
FEFMAX-%PRED-PRE: 90 %
FEFMAX-PRE: 8.66 L/SEC
FEFMAX-PRED: 9.52 L/SEC
FEV1-%PRED-PRE: 91 %
FEV1-PRE: 3.49 L
FEV1FEV6-PRE: 82 %
FEV1FEV6-PRED: 84 %
FEV1FVC-PRE: 82 %
FEV1FVC-PRED: 85 %
FEV1SVC-PRE: 84 %
FEV1SVC-PRED: 76 %
FIFMAX-PRE: 6.02 L/SEC
FRCPLETH-%PRED-PRE: 98 %
FRCPLETH-PRE: 3.07 L
FRCPLETH-PRED: 3.13 L
FVC-%PRED-PRE: 94 %
FVC-PRE: 4.25 L
FVC-PRED: 4.51 L
IC-%PRED-PRE: 117 %
IC-PRE: 3.88 L
IC-PRED: 3.28 L
INTERPRETATION ECG - MUSE: NORMAL
P AXIS - MUSE: 77 DEGREES
PR INTERVAL - MUSE: 160 MS
QRS DURATION - MUSE: 124 MS
QT - MUSE: 428 MS
QTC - MUSE: 484 MS
R AXIS - MUSE: 136 DEGREES
RVPLETH-%PRED-PRE: 174 %
RVPLETH-PRE: 2.79 L
RVPLETH-PRED: 1.6 L
SYSTOLIC BLOOD PRESSURE - MUSE: NORMAL MMHG
T AXIS - MUSE: -30 DEGREES
TLCPLETH-%PRED-PRE: 107 %
TLCPLETH-PRE: 6.95 L
TLCPLETH-PRED: 6.47 L
VA-%PRED-PRE: 92 %
VA-PRE: 5.41 L
VC-%PRED-PRE: 82 %
VC-PRE: 4.15 L
VC-PRED: 5.04 L
VENTRICULAR RATE- MUSE: 77 BPM

## 2024-04-11 NOTE — TELEPHONE ENCOUNTER
4/11/2024  @ 10:52 AM -  Tyvaso DPI (16/32/48 mcg titr kit) - new start - Approved today - Your request has been approved  Authorization Expiration Date: 4/10/2025    Prior Authorization Approval    Medication: TYVASO DPI TITRATION KIT 16 & 32 & 48 MCG IN POWD  Authorization Effective Date: 4/11/2024  Authorization Expiration Date: 4/10/2025  Approved Dose/Quantity: 252/30  Reference #: PA Case ID #: [id number]   Insurance Company: [employer] - Phone [phone] Fax [phone]:  MARK  Expected CoPay: $    CoPay Card Available:      Financial Assistance Needed: *Undetermined as of the date of this note Pt may qualify for $5 copay card  Which Pharm[facility] is filling the [facility] [place]    Updated Snapshot, added to PA Calendar; ENROLLMENT faxed to [facility]: [phone]         Lisette SAHA CMA- Prior Auths  Cardiology/Pulmonary Hypertension

## 2024-04-11 NOTE — TELEPHONE ENCOUNTER
4/11/2024  @ 9:25 AM -  sildenafil 20 mg (90/30) - Approved on April 10  Your request has been approved  Authorization Expiration Date: 4/9/2025     Prior Authorization Approval    Medication: SILDENAFIL CITRATE 20 MG PO TABS  Authorization Effective Date: 4/10/2024  Authorization Expiration Date: 4/9/2025  Approved Dose/Quantity: 90/30  Reference #: PA Case ID #: [id number]   Insurance Company: [employer] - Phone [phone] Fax [phone]:  MARK  Expected CoPay: $    CoPay Card Available:      Financial Assistance Needed: *Undetermined as of the date of this note   Which Pharmacy is filling the prescription: [facility] - [place], [place] - [facility] DEONNA MANRIQUE    Updated Snapshot, added to PA Calendar; Routed to  Nursing :  JOVANI Araya CMA- Prior Auths  Cardiology/Pulmonary Hypertension

## 2024-04-11 NOTE — TELEPHONE ENCOUNTER
4/11/2024  @ 9:30 AM -  ambrisentan 5 mg /10 mg -  Approved today - Your request has been approved  Authorization Expiration Date: 4/10/2025  [No letter received with [facility] approval alert as of this note.]     Prior Authorization Approval    Medication: AMBRISENTAN 5 MG PO TABS  Authorization Effective Date: 4/10/2024  Authorization Expiration Date: 4/10/2025  Approved Dose/Quantity: 30/30  Reference #: PA Case ID #: [id number]   Insurance Company: [employer] - Phone [phone] Fax [phone]:  MARK  Expected CoPay: $    CoPay Card Available:      Financial Assistance Needed: *Undetermined as of the date of this note   Which Pharmacy is filling the prescription: [facility] - [place], [place] - [address]    Updated Snapshot, added to PA Calendar;Routed to  Nursing :  Yaritza Bull RN to send eRx to [facility] to start med.  No enrollment forms needed, no REMS forms needed.   Routed to PH Nursing :  JOVANI Araya CMA- Prior Auths  Cardiology/Pulmonary Hypertension     @ 11:11 [facility] FAX:

## 2024-04-15 RX ORDER — TREPROSTINIL 16-32 MCG
16 KIT INHALATION 4 TIMES DAILY
Qty: 56 EACH | Refills: 11 | Status: SHIPPED | OUTPATIENT
Start: 2024-04-15 | End: 2024-06-01

## 2024-04-15 RX ORDER — SILDENAFIL CITRATE 20 MG/1
20 TABLET ORAL 3 TIMES DAILY
Qty: 90 TABLET | Refills: 11 | Status: SHIPPED | OUTPATIENT
Start: 2024-04-15

## 2024-04-17 DIAGNOSIS — I27.20 PULMONARY HYPERTENSION (H): Primary | ICD-10-CM

## 2024-04-17 RX ORDER — AMBRISENTAN 5 MG/1
5 TABLET, FILM COATED ORAL DAILY
Qty: 30 TABLET | Refills: 1 | COMMUNITY
Start: 2024-04-17 | End: 2024-04-19

## 2024-04-19 DIAGNOSIS — I27.20 PULMONARY HYPERTENSION (H): ICD-10-CM

## 2024-04-19 RX ORDER — FUROSEMIDE 20 MG
20 TABLET ORAL DAILY
Qty: 90 TABLET | Refills: 3 | Status: SHIPPED | OUTPATIENT
Start: 2024-04-19

## 2024-04-19 RX ORDER — AMBRISENTAN 5 MG/1
5 TABLET, FILM COATED ORAL DAILY
Qty: 30 TABLET | Refills: 1 | Status: SHIPPED | OUTPATIENT
Start: 2024-04-19 | End: 2024-06-06

## 2024-04-19 NOTE — TELEPHONE ENCOUNTER
All PA's started and Lasix sent to pharmacy.  Clarifying Digoxin dose order. Yaritza Bull RN on 4/19/2024 at 1:37 PM    RE: med clarification  Received: 3 days ago  Andreas Johns MD sent to aYritza Bull, RN  [facility] for the typo.    Digoxin 250 mcg daily    Thank you

## 2024-04-22 RX ORDER — DIGOXIN 250 MCG
250 TABLET ORAL DAILY
Qty: 30 TABLET | Refills: 11 | Status: SHIPPED | OUTPATIENT
Start: 2024-04-22

## 2024-04-30 ENCOUNTER — TELEPHONE (OUTPATIENT)
Dept: FAMILY MEDICINE | Facility: CLINIC | Age: 29
End: 2024-04-30

## 2024-04-30 NOTE — TELEPHONE ENCOUNTER
"Dr Woody Braden,    Call from JOVANI Mena, [facility] TB program     Received referral from Dr Minh Zamora  for TB on 4/11/24, note sent with referral from 2/1/24    Pt has HIV and TB and Hep C  - it may be easier to have one ID provider to manage    It may be easier to see one provider for all these issues rather than county for the TB alone or do you want him cared for with the county for the TB?    This pt did see Dr Augustine Elmore MD ID at [facility] on 4/4/24 and note from that visit \"TB screening completed. Negative quantiferon test. AFB sputum x 3 negative Sept 2023\" (this relayed to Trina as well)    Annia Duke, RN, BSN  [facility]       "

## 2024-05-01 ENCOUNTER — LAB (OUTPATIENT)
Dept: LAB | Facility: CLINIC | Age: 29
End: 2024-05-01
Attending: FAMILY MEDICINE
Payer: COMMERCIAL

## 2024-05-01 ENCOUNTER — OFFICE VISIT (OUTPATIENT)
Dept: GASTROENTEROLOGY | Facility: CLINIC | Age: 29
End: 2024-05-01
Attending: FAMILY MEDICINE
Payer: COMMERCIAL

## 2024-05-01 VITALS
HEART RATE: 103 BPM | OXYGEN SATURATION: 99 % | BODY MASS INDEX: 24.24 KG/M2 | SYSTOLIC BLOOD PRESSURE: 119 MMHG | TEMPERATURE: 98.5 F | WEIGHT: 146.4 LBS | DIASTOLIC BLOOD PRESSURE: 83 MMHG

## 2024-05-01 DIAGNOSIS — B18.2 CHRONIC HEPATITIS C WITHOUT HEPATIC COMA (H): Primary | ICD-10-CM

## 2024-05-01 DIAGNOSIS — R06.09 DOE (DYSPNEA ON EXERTION): ICD-10-CM

## 2024-05-01 DIAGNOSIS — Z21 ASYMPTOMATIC HUMAN IMMUNODEFICIENCY VIRUS (HIV) INFECTION STATUS (H): ICD-10-CM

## 2024-05-01 DIAGNOSIS — I27.20 PULMONARY HYPERTENSION (H): ICD-10-CM

## 2024-05-01 DIAGNOSIS — F10.20 UNCOMPLICATED ALCOHOL DEPENDENCE (H): ICD-10-CM

## 2024-05-01 DIAGNOSIS — B18.2 CHRONIC HEPATITIS C WITHOUT HEPATIC COMA (H): ICD-10-CM

## 2024-05-01 PROBLEM — T19.4XXS: Status: RESOLVED | Noted: 2024-02-01 | Resolved: 2024-05-01

## 2024-05-01 LAB
ALBUMIN SERPL BCG-MCNC: 3.2 G/DL (ref 3.5–5.2)
ALP SERPL-CCNC: 294 U/L (ref 40–150)
ALT SERPL W P-5'-P-CCNC: 131 U/L (ref 0–70)
ANION GAP SERPL CALCULATED.3IONS-SCNC: 11 MMOL/L (ref 7–15)
AST SERPL W P-5'-P-CCNC: 438 U/L (ref 0–45)
BILIRUB SERPL-MCNC: 1.1 MG/DL
BUN SERPL-MCNC: 8.4 MG/DL (ref 6–20)
CALCIUM SERPL-MCNC: 7.9 MG/DL (ref 8.6–10)
CHLORIDE SERPL-SCNC: 108 MMOL/L (ref 98–107)
CREAT SERPL-MCNC: 0.68 MG/DL (ref 0.67–1.17)
DEPRECATED HCO3 PLAS-SCNC: 21 MMOL/L (ref 22–29)
EGFRCR SERPLBLD CKD-EPI 2021: >90 ML/MIN/1.73M2
ERYTHROCYTE [DISTWIDTH] IN BLOOD BY AUTOMATED COUNT: 15.2 % (ref 10–15)
GLUCOSE SERPL-MCNC: 101 MG/DL (ref 70–99)
HCT VFR BLD AUTO: 39.8 % (ref 40–53)
HGB BLD-MCNC: 13.7 G/DL (ref 13.3–17.7)
MCH RBC QN AUTO: 32.4 PG (ref 26.5–33)
MCHC RBC AUTO-ENTMCNC: 34.4 G/DL (ref 31.5–36.5)
MCV RBC AUTO: 94 FL (ref 78–100)
NT-PROBNP SERPL-MCNC: 2146 PG/ML (ref 0–450)
PLATELET # BLD AUTO: 84 10E3/UL (ref 150–450)
POTASSIUM SERPL-SCNC: 3.7 MMOL/L (ref 3.4–5.3)
PROT SERPL-MCNC: 8.2 G/DL (ref 6.4–8.3)
RBC # BLD AUTO: 4.23 10E6/UL (ref 4.4–5.9)
SODIUM SERPL-SCNC: 140 MMOL/L (ref 135–145)
WBC # BLD AUTO: 6.7 10E3/UL (ref 4–11)

## 2024-05-01 PROCEDURE — 80053 COMPREHEN METABOLIC PANEL: CPT | Performed by: PATHOLOGY

## 2024-05-01 PROCEDURE — G0463 HOSPITAL OUTPT CLINIC VISIT: HCPCS | Performed by: STUDENT IN AN ORGANIZED HEALTH CARE EDUCATION/TRAINING PROGRAM

## 2024-05-01 PROCEDURE — 86708 HEPATITIS A ANTIBODY: CPT | Performed by: STUDENT IN AN ORGANIZED HEALTH CARE EDUCATION/TRAINING PROGRAM

## 2024-05-01 PROCEDURE — 99000 SPECIMEN HANDLING OFFICE-LAB: CPT | Performed by: PATHOLOGY

## 2024-05-01 PROCEDURE — 36415 COLL VENOUS BLD VENIPUNCTURE: CPT | Performed by: PATHOLOGY

## 2024-05-01 PROCEDURE — 85027 COMPLETE CBC AUTOMATED: CPT | Performed by: PATHOLOGY

## 2024-05-01 PROCEDURE — 83880 ASSAY OF NATRIURETIC PEPTIDE: CPT | Performed by: PATHOLOGY

## 2024-05-01 PROCEDURE — 99214 OFFICE O/P EST MOD 30 MIN: CPT | Mod: GC | Performed by: STUDENT IN AN ORGANIZED HEALTH CARE EDUCATION/TRAINING PROGRAM

## 2024-05-01 ASSESSMENT — PAIN SCALES - GENERAL: PAINLEVEL: NO PAIN (0)

## 2024-05-01 NOTE — LETTER
5/1/2024         RE: July Win  [address]  [place] [address]        Dear Colleague,    Thank you for referring your patient, Carolina Torres, to the [facility] [place]. Please see a copy of my visit note below.    HEPATOLOGY CLINIC VISIT - NEW PATIENT    CC/REFERRING MD:   Brina Du  REASON FOR CONSULTATION:  Hepatitis C     HPI:  28 year old male with a PMHx pertinent untreated Hepatitis C, HIV w/ CD4   Pulmonary Hypertension and Polysubstance Use Disorder ( Cocaine, Opioids) who presents to hepatology clinic for further evaluation of untreated Hepatitis C. Patient is Kostas speaking and telephone  used for the entirety of this clinical encounter.     Briefly, patient states that he was first notified of his Hepatitis C status at the same time that he was diagnosed with HIV ~ 1-year prior. He does endorse several year hx of IVDU w/ needle sharing and he thinks he acquired it during that time; reports last use ~3-years prior.  As part of his intake at the [facility] in 1/2024 and at the time he was found to have HCV RNA level of ~9 mil. Does endorse significant EtOH drinking ~1 Bottle size of forearm every 2-3 days and this has been ongoing for several years; at times drinks less if he is unable to afford it. Denies any Cannabinoid use; does endorse smoking cigarettes ~3-daily.   With regards to underlying liver disease, he states that prior to coming here from [facility], he was told that he had liver disease but he is unable to tell me anything beyond that.       Today, patient denies jaundice, lower extremity edema, abdominal distension, lethargy or confusion. He denies melena, hematemesis or hematochezia.   Liver Biochemistries: , , , Albumin 3.2, Tbili 1.1.  CBC: PLT 84   INR: 1.21 (4/9)     Patient denies fevers, sweats or chills.  Weight stable.    ROS:  10pt ROS performed and otherwise negative.    Allergies  No Known  Allergies    Medications:     Medications reviewed with patient today, see Medication List/Assessment for details.  No other NSAID/anticoagulation reported by patient.  No other OTC/herbal/supplements reported by patient.    Current Outpatient Medications   Medication Sig Dispense Refill     ambrisentan (LETAIRIS) 5 MG tablet Take 1 tablet (5 mg) by mouth daily 30 tablet 1     bictegravir-emtricitabine-tenofovir (BIKTARVY) -25 MG per tablet Take 1 tablet by mouth daily 30 tablet 11     digoxin (LANOXIN) 250 MCG tablet Take 1 tablet (250 mcg) by mouth daily 30 tablet 11     furosemide (LASIX) 20 MG tablet Take 1 tablet (20 mg) by mouth daily 90 tablet 3     sildenafil (REVATIO) 20 MG tablet Take 1 tablet (20 mg) by mouth 3 times daily 90 tablet 11     Treprostinil (TYVASO DPI TITRATION KIT) 112 x 16MCG & 84 x 32MCG POWD Inhale 16 mcg into the lungs 4 times daily Increase dose weekly as tolerated to max dose of 64mcg. (16, 32, 48, 64) 56 each 11     No current facility-administered medications for this visit.          Surgical History:   Past Surgical History:   Procedure Laterality Date     CV RIGHT HEART CATH MEASUREMENTS RECORDED N/A 4/9/2024    Procedure: Heart Cath Right Heart Cath;  Surgeon: Andreas Johns MD;  Location:  HEART CARDIAC CATH LAB     CV RIGHT HEART CATH PULMONARY VASODILATOR STUDY N/A 4/9/2024    Procedure: Right Heart Cath Pulmonary Vasodilator Study;  Surgeon: Andreas Johns MD;  Location:  HEART CARDIAC CATH LAB         Past Medical History:  As noted above.  Past Medical History:   Diagnosis Date     Alcohol dependence (H)      Cannabis abuse      Current smoker      Hepatitis C      Human immunodeficiency virus (HIV) disease (H) 2019    upon arrival is on Dolutegravir/Lamivudine/Tenovir 50/300/300  one daily; CD4 788 (7/23)     Opioid use disorder, moderate, in sustained remission (H)     diagnosis [place] in [place] 1/[place]24     Other ascites      Pulmonary  hypertension (H)     severely impaired right ventricular systolic function     Reaction to QuantiFERON-TB test (QFT) without active tuberculosis     class B1 upon arrival to [place]     Tattoos     right hand, left leg       Social History:   Social History     Tobacco Use     Smoking status: Every Day     Types: Cigarettes     Passive exposure: Current     Smokeless tobacco: Current     Types: Chew     Tobacco comments:     Betel nut with tobacco    Substance Use Topics     Alcohol use: Not on file         Family History:   No colon/panc/esophageal/other GI CA, no other HNPCC-related Kun.  No IBD/celiac, no other AI/liver/thyroid disease.  No family history on file.      Vitals:   /83   Pulse 103   Temp 98.5  F (36.9  C) (Oral)   Wt 66.4 kg (146 lb 6.4 oz)   SpO2 99%   BMI 24.24 kg/m    Body mass index is 24.24 kg/m .    Physical Examination:  Constitutional: Well-developed, well-nourished, in no apparent distress.    HEENT: Normocephalic.  scleral icterus. Moist oral mucosa. Dentition abnormal   Neck/Lymph: Normal ROM, supple. No thyromegaly.   Cardiac:  Regular rate and rhythm.    Respiratory: Nl effort.   GI:  Abdomen soft, non-distended, non-tender. BS present. No shifting dullness.+ Hepatomegaly.   Skin:  Tattoos throughout extremities, trunk, and abdomen. Skin is warm and dry. No rash noted.   No spider nevi noted.  No palmar erythema  Peripheral Vascular: No lower extremity edema. 2+ pulses in all extremities  Musculoskeletal:  ROM intact, Normal muscle bulk    Psychiatric: Normal mood and affect. Behavior is normal.  Neuro:  No asterixis, No tremor      Labs:   Lab Results   Component Value Date     05/01/2024    POTASSIUM 3.7 05/01/2024    CHLORIDE 108 (H) 05/01/2024    ANIONGAP 11 05/01/2024    CO2 21 (L) 05/01/2024    BUN 8.4 05/01/2024    CR 0.68 05/01/2024    GFRESTIMATED >90 05/01/2024    ASHLEY 7.9 (L) 05/01/2024      Lab Results   Component Value Date    WBC 6.7 05/01/2024    HGB 13.7  05/01/2024    HCT 39.8 (L) 05/01/2024    MCV 94 05/01/2024    MCH 32.4 05/01/2024    MCHC 34.4 05/01/2024    RDW 15.2 (H) 05/01/2024    PLT 84 (L) 05/01/2024     Lab Results   Component Value Date    ALBUMIN 3.2 (L) 05/01/2024    ALKPHOS 294 (H) 05/01/2024     (H) 05/01/2024     Lab Results   Component Value Date    INR 1.21 (H) 04/09/2024       MELD 3.0: 12 at 4/9/2024  8:14 AM  MELD-Na: 12 at 4/9/2024  8:14 AM  Calculated from:  Serum Creatinine: 0.82 mg/dL (Using min of 1 mg/dL) at 4/9/2024  8:14 AM  Serum Sodium: 137 mmol/L at 4/9/2024  8:14 AM  Total Bilirubin: 2.6 mg/dL at 4/9/2024  8:14 AM  Serum Albumin: 3.6 g/dL (Using max of 3.5 g/dL) at 4/9/2024  8:14 AM  INR(ratio): 1.21 at 4/9/2024  8:14 AM  Age at listing (hypothetical): 28 years  Sex: Male at 4/9/2024  8:14 AM      Procedures:  Liver biopsy: No history     EGD: No History     Colonoscopy: No History     Relevant Imaging:   No images are attached to the encounter.   ASSESSMENT:  28 year old male with a PMHx pertinent untreated Hepatitis C, HIV w/ CD4   Pulmonary Hypertension and Polysubstance Use Disorder ( Cocaine, Opioids) who presents to hepatology clinic for further evaluation of untreated Hepatitis C. Patient is Kostas speaking and telephone  used for the entirety of this clinical encounter.       #. Hepatitis C, Untreated   #. EtOH Dependence   #. Abnormal Liver Tests   #. Hx of HIV   -Patient with untreated HepC of unknown chronicity here for consideration of DAA.   -No clinical evidence of underlying liver disease; elevated liver tests suggestive of underlying chronic liver disease although possibly some degree of congestive hepatopathy given  severe Pulmonary HTN/elevated RVSP with recent NT-ProBNP 2000s.   -FIB4 of 12.3 suggests underlying advanced fibrosis.; elevated INR and low albumin c.f. synthetic liver dysfunction; Low Platelet ct possibly 2/2 CSPH   -Although reported to have Genotype 3A, unable to confirm with prior  laboratory testing.   -Patient is HepB immune and based on serologies, was infected at some point and cleared. Although DAA does carry a risk of reactivation-particularly in setting of immunosuppression--patient with well controlled HIV and nl       PLAN:  -Hepatitis C RNA with Genotype Testing   -Hepatic Function Panel, INR, CBC in 3-months   -US Abdomen with Elastography to evaluate for underlying fibrosis/cirrhosis   -Patient counseled on EtOH cessation       RTC 3, sooner if symptomatic.        Thank you very much for allowing me to participate in the care of this patient.  If you have any questions regarding my recommendations, please do not hesitate to contact me.        Patient discussed and seen with Staff Dr. López, who is in agreement with the above.       PAMELA HILLS MD, MD  Gastroenterology Fellow, PGY-5   [facility]   Department of Gastroenterology, Hepatology and Nutrition    Attestation with edits by Gerson Ramon MD at 5/1/2024 12:39 PM (Updated):  Physician Attestation  I, Gerson López, saw this patient with the learner and agree with the learner s findings and plan of care as documented in the note.  I personally reviewed vital signs, medications, labs, and imaging.    HCV/HIV co-infection.  Treatment-naive, unknown genotype HCV infection.  Hx ETOH, thrombocytopenia and palpable liver edge on physical exam raise suspicion for advanced liver disease/cirrhosis.  Awaiting abd with elastography then would plan to treat HCV with 12 weeks for GLEC-PIB.    Gerson López  Date of Service (when I saw the patient): 05/01/24    I spent 30 minutes on the date of the encounter doing chart review, history and exam, documentation and further activities as noted above.        Again, thank you for allowing me to participate in the care of your patient.        Sincerely,        PAMELA HILLS MD

## 2024-05-01 NOTE — PROGRESS NOTES
HEPATOLOGY CLINIC VISIT - NEW PATIENT    CC/REFERRING MD:   Brina Du  REASON FOR CONSULTATION:  Hepatitis C     HPI:  28 year old male with a PMHx pertinent untreated Hepatitis C, HIV w/ CD4   Pulmonary Hypertension and Polysubstance Use Disorder ( Cocaine, Opioids) who presents to hepatology clinic for further evaluation of untreated Hepatitis C. Patient is Kostas speaking and telephone  used for the entirety of this clinical encounter.     Briefly, patient states that he was first notified of his Hepatitis C status at the same time that he was diagnosed with HIV ~ 1-year prior. He does endorse several year hx of IVDU w/ needle sharing and he thinks he acquired it during that time; reports last use ~3-years prior.  As part of his intake at the [facility] in 1/2024 and at the time he was found to have HCV RNA level of ~9 mil. Does endorse significant EtOH drinking ~1 Bottle size of forearm every 2-3 days and this has been ongoing for several years; at times drinks less if he is unable to afford it. Denies any Cannabinoid use; does endorse smoking cigarettes ~3-daily.   With regards to underlying liver disease, he states that prior to coming here from [facility], he was told that he had liver disease but he is unable to tell me anything beyond that.       Today, patient denies jaundice, lower extremity edema, abdominal distension, lethargy or confusion. He denies melena, hematemesis or hematochezia.   Liver Biochemistries: , , , Albumin 3.2, Tbili 1.1.  CBC: PLT 84   INR: 1.21 (4/9)     Patient denies fevers, sweats or chills.  Weight stable.    ROS:  10pt ROS performed and otherwise negative.    Allergies  No Known Allergies    Medications:     Medications reviewed with patient today, see Medication List/Assessment for details.  No other NSAID/anticoagulation reported by patient.  No other OTC/herbal/supplements reported by patient.    Current Outpatient Medications    Medication Sig Dispense Refill    ambrisentan (LETAIRIS) 5 MG tablet Take 1 tablet (5 mg) by mouth daily 30 tablet 1    bictegravir-emtricitabine-tenofovir (BIKTARVY) -25 MG per tablet Take 1 tablet by mouth daily 30 tablet 11    digoxin (LANOXIN) 250 MCG tablet Take 1 tablet (250 mcg) by mouth daily 30 tablet 11    furosemide (LASIX) 20 MG tablet Take 1 tablet (20 mg) by mouth daily 90 tablet 3    sildenafil (REVATIO) 20 MG tablet Take 1 tablet (20 mg) by mouth 3 times daily 90 tablet 11    Treprostinil (TYVASO DPI TITRATION KIT) 112 x 16MCG & 84 x 32MCG POWD Inhale 16 mcg into the lungs 4 times daily Increase dose weekly as tolerated to max dose of 64mcg. (16, 32, 48, 64) 56 each 11     No current facility-administered medications for this visit.          Surgical History:   Past Surgical History:   Procedure Laterality Date    CV RIGHT HEART CATH MEASUREMENTS RECORDED N/A 4/9/2024    Procedure: Heart Cath Right Heart Cath;  Surgeon: Andreas Johns MD;  Location:  HEART CARDIAC CATH LAB    CV RIGHT HEART CATH PULMONARY VASODILATOR STUDY N/A 4/9/2024    Procedure: Right Heart Cath Pulmonary Vasodilator Study;  Surgeon: Andreas Johns MD;  Location:  HEART CARDIAC CATH LAB         Past Medical History:  As noted above.  Past Medical History:   Diagnosis Date    Alcohol dependence (H)     Cannabis abuse     Current smoker     Hepatitis C     Human immunodeficiency virus (HIV) disease (H) 2019    upon arrival is on Dolutegravir/Lamivudine/Tenovir 50/300/300  one daily; CD4 788 (7/23)    Opioid use disorder, moderate, in sustained remission (H)     [place] 1[place]    Other ascites     Pulmonary hypertension (H)     severely impaired right ventricular systolic function    Reaction to QuantiFERON-TB test (QFT) without active tuberculosis     class B1 upon arrival to [place]    Tattoos     right hand, left leg       Social History:   Social History     Tobacco Use    Smoking  status: Every Day     Types: Cigarettes     Passive exposure: Current    Smokeless tobacco: Current     Types: Chew    Tobacco comments:     Betel nut with tobacco    Substance Use Topics    Alcohol use: Not on file         Family History:   No colon/panc/esophageal/other GI CA, no other HNPCC-related Kun.  No IBD/celiac, no other AI/liver/thyroid disease.  No family history on file.      Vitals:   /83   Pulse 103   Temp 98.5  F (36.9  C) (Oral)   Wt 66.4 kg (146 lb 6.4 oz)   SpO2 99%   BMI 24.24 kg/m    Body mass index is 24.24 kg/m .    Physical Examination:  Constitutional: Well-developed, well-nourished, in no apparent distress.    HEENT: Normocephalic.  scleral icterus. Moist oral mucosa. Dentition abnormal   Neck/Lymph: Normal ROM, supple. No thyromegaly.   Cardiac:  Regular rate and rhythm.    Respiratory: Nl effort.   GI:  Abdomen soft, non-distended, non-tender. BS present. No shifting dullness.+ Hepatomegaly.   Skin:  Tattoos throughout extremities, trunk, and abdomen. Skin is warm and dry. No rash noted.   No spider nevi noted.  No palmar erythema  Peripheral Vascular: No lower extremity edema. 2+ pulses in all extremities  Musculoskeletal:  ROM intact, Normal muscle bulk    Psychiatric: Normal mood and affect. Behavior is normal.  Neuro:  No asterixis, No tremor      Labs:   Lab Results   Component Value Date     05/01/2024    POTASSIUM 3.7 05/01/2024    CHLORIDE 108 (H) 05/01/2024    ANIONGAP 11 05/01/2024    CO2 21 (L) 05/01/2024    BUN 8.4 05/01/2024    CR 0.68 05/01/2024    GFRESTIMATED >90 05/01/2024    ASHLEY 7.9 (L) 05/01/2024      Lab Results   Component Value Date    WBC 6.7 05/01/2024    HGB 13.7 05/01/2024    HCT 39.8 (L) 05/01/2024    MCV 94 05/01/2024    MCH 32.4 05/01/2024    MCHC 34.4 05/01/2024    RDW 15.2 (H) 05/01/2024    PLT 84 (L) 05/01/2024     Lab Results   Component Value Date    ALBUMIN 3.2 (L) 05/01/2024    ALKPHOS 294 (H) 05/01/2024     (H) 05/01/2024      Lab Results   Component Value Date    INR 1.21 (H) 04/09/2024       MELD 3.0: 12 at 4/9/2024  8:14 AM  MELD-Na: 12 at 4/9/2024  8:14 AM  Calculated from:  Serum Creatinine: 0.82 mg/dL (Using min of 1 mg/dL) at 4/9/2024  8:14 AM  Serum Sodium: 137 mmol/L at 4/9/2024  8:14 AM  Total Bilirubin: 2.6 mg/dL at 4/9/2024  8:14 AM  Serum Albumin: 3.6 g/dL (Using max of 3.5 g/dL) at 4/9/2024  8:14 AM  INR(ratio): 1.21 at 4/9/2024  8:14 AM  Age at listing (hypothetical): 28 years  Sex: Male at 4/9/2024  8:14 AM      Procedures:  Liver biopsy: No history     EGD: No History     Colonoscopy: No History     Relevant Imaging:   No images are attached to the encounter.   ASSESSMENT:  28 year old male with a PMHx pertinent untreated Hepatitis C, HIV w/ CD4   Pulmonary Hypertension and Polysubstance Use Disorder ( Cocaine, Opioids) who presents to hepatology clinic for further evaluation of untreated Hepatitis C. Patient is Kostas speaking and telephone  used for the entirety of this clinical encounter.       #. Hepatitis C, Untreated   #. EtOH Dependence   #. Abnormal Liver Tests   #. Hx of HIV   -Patient with untreated HepC of unknown chronicity here for consideration of DAA.   -No clinical evidence of underlying liver disease; elevated liver tests suggestive of underlying chronic liver disease although possibly some degree of congestive hepatopathy given  severe Pulmonary HTN/elevated RVSP with recent NT-ProBNP 2000s.   -FIB4 of 12.3 suggests underlying advanced fibrosis.; elevated INR and low albumin c.f. synthetic liver dysfunction; Low Platelet ct possibly 2/2 CSPH   -Although reported to have Genotype 3A, unable to confirm with prior laboratory testing.   -Patient is HepB immune and based on serologies, was infected at some point and cleared. Although DAA does carry a risk of reactivation-particularly in setting of immunosuppression--patient with well controlled HIV and nl       PLAN:  -Hepatitis C RNA with  Genotype Testing   -Hepatic Function Panel, INR, CBC in 3-months   -US Abdomen with Elastography to evaluate for underlying fibrosis/cirrhosis   -Patient counseled on EtOH cessation       RTC 3, sooner if symptomatic.        Thank you very much for allowing me to participate in the care of this patient.  If you have any questions regarding my recommendations, please do not hesitate to contact me.        Patient discussed and seen with Staff Dr. López, who is in agreement with the above.       PAMELA HILLS MD, MD  Gastroenterology Fellow, PGY-5   [facility]   Department of Gastroenterology, Hepatology and Nutrition

## 2024-05-01 NOTE — NURSING NOTE
Chief Complaint   Patient presents with    New Patient       ./83   Pulse 103   Temp 98.5  F (36.9  C) (Oral)   Wt 66.4 kg (146 lb 6.4 oz)   SpO2 99%   BMI 24.24 kg/m      Fracisco Ricardo on 5/1/2024 at 8:38 AM

## 2024-05-02 LAB — HAV AB SER QL IA: REACTIVE

## 2024-05-02 NOTE — TELEPHONE ENCOUNTER
"Trina HAHN returning call. Reports PCP can disregard previous message, she realized patient is classified as \"class B\" and it is required for him to be referred to TB clinic for management.     Kylah Willis RN BSN  [facility]  "

## 2024-05-07 ENCOUNTER — PATIENT OUTREACH (OUTPATIENT)
Dept: CARE COORDINATION | Facility: CLINIC | Age: 29
End: 2024-05-07
Payer: COMMERCIAL

## 2024-05-07 NOTE — PROGRESS NOTES
Clinic Care Coordination Contact  Community Health Worker Follow Up    Care Gaps:   Health Maintenance Due   Topic Date Due    ADVANCE CARE PLANNING  Never done    COPD ACTION PLAN  Never done    MENINGITIS IMMUNIZATION (1 - Risk 2-dose series) Never done     PCP to address other care gaps at the next follow up appointment.     Care Plan:   Care Plan: Cardiology       Problem: Pulmonary hypertension       Goal: Patient will attend his cardiology clinic appointment in the next 6 months.       Start Date: 1/24/2024 Expected End Date: 7/31/2024    This Visit's Progress: 50% Recent Progress: 40%    Note:     Barriers: language barrier, low literacy, noncompliance, and lack of knowledge how to navigate complex health care system  Strengths: motivated to attend appt  Patient expressed understanding of goal: Yes    Action steps to achieve this goal:  1. I will answer my phone when cardiology clinic call to schedule follow up appointment.   2. I will attend my ECHO appointment as scheduled 2/7/2024 at 9:00am. Completed.   3. I will attend my initial cardiology appointment on 4/1/2024 at 10:15am with Andreas Johns MD. Completed.  4. I will attend my appointment for ECHO as scheduled on 4/09/2024 at 11:00 AM. Completed.  5. I will update CCC team at outreach.     Per cardiology clinic protocol that the clinic will reach out to patient directly to schedule an appointment.                             Care Plan: Pulmonology       Problem: Pulmonary hypertension       Goal: Patient will attend his pulmonology clinic appointment in the next 6 months.       Start Date: 1/24/2024 Expected End Date: 7/31/2024    This Visit's Progress: 40% Recent Progress: 30%    Note:     Barriers: language barrier, low literacy, noncompliance, and lack of knowledge how to navigate complex health care system  Strengths: motivated to attend appt  Patient expressed understanding of goal: Yes    Action steps to achieve this goal:  1. I will  attend my rescheduled PFT and pulmonology appts on 4/09/2024. Completed.  2. I will answer my phone when pulmonology clinic call to schedule follow up appointment.  3. I will let my [facility] worker/coordinator know once the appointment is scheduled.   4. I will update CCC team know at outreach.    Per Pulmonary clinic's protocol that the clinic or care team will reach out to patient to schedule follow up appointment.                              Care Plan: ID clinic       Problem: ID clinic       Goal: Patient will attend ID clinic appointment as scheduled in the next 12 months.       Start Date: 3/28/2024 Expected End Date: 12/31/2024    This Visit's Progress: 30% Recent Progress: 20%    Note:     Barriers: language barrier, low literacy, noncompliance, and lack of knowledge how to navigate complex health care system  Strengths: motivated to attend appt  Patient expressed understanding of goal: Yes    Action steps to achieve this goal:  1. I will attend my follow up appointment with infection doctor as scheduled on 7/11/2024.  2. I will schedule follow up appointment if recommended.  3. I will update CCC team at outreach.                             Care Plan: GI       Problem: Chronic hepatitis C without hepatic coma       Goal: Patient will attend GI clinic appointment in the next 12 months.       Start Date: 3/28/2024 Expected End Date: 12/31/2024    This Visit's Progress: 20% Recent Progress: 10%    Note:     Barriers: language barrier, low literacy, noncompliance, and lack of knowledge how to navigate complex health care system  Strengths: motivated to attend appt  Patient expressed understanding of goal: Yes    Action steps to achieve this goal:  1. I will attend my GI appointment as scheduled on 5/1/2024 at 8:00am lab then 8:45 am with Dr Morales.  2. I will schedule a follow up appointment with my provider if it is recommended to do so while I am at the clinic.  3. I will follow up with [facility] regarding this goal  at each outreach until it is completed.                           Intervention and Education during outreach:  -Patient has all medications and taking them accordingly  -Patient and family are receiving [place] benefits and receiving helps from the  who's helping with community resources, transportation and etc.  -CHW is unsure when patient needs to follow up with pulmonology and cardiology clinics, CHW reached out but was informed that the clinic will reach out to patient to schedule the appointments.   -Patient was informed to call with question or concerns.     CHW Next Outreach: In one month.

## 2024-05-09 ENCOUNTER — OFFICE VISIT (OUTPATIENT)
Dept: PSYCHIATRY | Facility: CLINIC | Age: 29
End: 2024-05-09
Attending: FAMILY MEDICINE
Payer: COMMERCIAL

## 2024-05-09 ENCOUNTER — VIRTUAL VISIT (OUTPATIENT)
Dept: INTERPRETER SERVICES | Facility: CLINIC | Age: 29
End: 2024-05-09
Payer: COMMERCIAL

## 2024-05-09 VITALS — HEART RATE: 77 BPM | DIASTOLIC BLOOD PRESSURE: 64 MMHG | OXYGEN SATURATION: 98 % | SYSTOLIC BLOOD PRESSURE: 102 MMHG

## 2024-05-09 DIAGNOSIS — F11.21 OPIOID DEPENDENCE IN REMISSION (H): ICD-10-CM

## 2024-05-09 DIAGNOSIS — F12.10 MARIJUANA ABUSE: ICD-10-CM

## 2024-05-09 PROCEDURE — 99207 PR NO CHARGE LOS: CPT | Performed by: NURSE PRACTITIONER

## 2024-05-09 ASSESSMENT — ANXIETY QUESTIONNAIRES
4. TROUBLE RELAXING: NOT AT ALL
5. BEING SO RESTLESS THAT IT IS HARD TO SIT STILL: NOT AT ALL
2. NOT BEING ABLE TO STOP OR CONTROL WORRYING: SEVERAL DAYS
8. IF YOU CHECKED OFF ANY PROBLEMS, HOW DIFFICULT HAVE THESE MADE IT FOR YOU TO DO YOUR WORK, TAKE CARE OF THINGS AT HOME, OR GET ALONG WITH OTHER PEOPLE?: SOMEWHAT DIFFICULT
IF YOU CHECKED OFF ANY PROBLEMS ON THIS QUESTIONNAIRE, HOW DIFFICULT HAVE THESE PROBLEMS MADE IT FOR YOU TO DO YOUR WORK, TAKE CARE OF THINGS AT HOME, OR GET ALONG WITH OTHER PEOPLE: SOMEWHAT DIFFICULT
GAD7 TOTAL SCORE: 2
GAD7 TOTAL SCORE: 2
1. FEELING NERVOUS, ANXIOUS, OR ON EDGE: SEVERAL DAYS
GAD7 TOTAL SCORE: 2
6. BECOMING EASILY ANNOYED OR IRRITABLE: NOT AT ALL
7. FEELING AFRAID AS IF SOMETHING AWFUL MIGHT HAPPEN: NOT AT ALL
7. FEELING AFRAID AS IF SOMETHING AWFUL MIGHT HAPPEN: NOT AT ALL
3. WORRYING TOO MUCH ABOUT DIFFERENT THINGS: NOT AT ALL

## 2024-05-09 ASSESSMENT — PATIENT HEALTH QUESTIONNAIRE - PHQ9
SUM OF ALL RESPONSES TO PHQ QUESTIONS 1-9: 0
SUM OF ALL RESPONSES TO PHQ QUESTIONS 1-9: 0

## 2024-05-09 NOTE — PATIENT INSTRUCTIONS
"Patient Education   The Panel Psychiatry Program  What to Expect  Here's what to expect in the Panel Psychiatry Program.   About the program  You'll be meeting with a psychiatric doctor to check your mental health. A psychiatric doctor helps you deal with troubling thoughts and feelings by giving you medicine. They'll make sure you know the plan for your care. You may see them for a long time. When you're feeling better, they may refer you back to seeing your family doctor.   If you have any questions, we'll be glad to talk to you.  About visits  Be open  At your visits, please talk openly about your problems. It may feel hard, but it's the best way for us to help you.  Cancelling visits  If you can't come to your visit, please call us right away at [phone]. If you don't cancel at least 24 hours (1 full day) before your visit, that's \"late cancellation.\"  Not showing up for your visits  Being very late is the same as not showing up. You'll be a \"no show\" if:  You're more than 15 minutes late for a 30-minute (half hour) visit.  You're more than 30 minutes late for a 60-minute (full hour) visit.  If you cancel late or don't show up 2 times within 6 months, we may end your care.  Getting help between visits  If you need help between visits, you can call us Monday to Friday from 8 a.m. to 4:30 p.m. at [phone].  Emergency care  Call 911 or go to the nearest emergency department if your life or someone else's life is in danger.  Call 988 anytime to reach the national Suicide and Crisis hotline.  Medicine refills  To refill your medicine, call your pharmacy. You can also call [facility] at [phone], Monday to Friday, 8 a.m. to 4:30 p.m. It can take 1 to 3 business days to get a refill.   Forms, letters, and tests  You may have papers to fill out, like FMLA, short-term disability, and workability. We can help you with these forms at your visits, but you must have an " appointment. You may need more than 1 visit for this, to be in an intensive therapy program, or both.  Before we can give you medicine for ADHD, we may refer you to get tested for it or confirm it another way.  We may not be able to give you an emotional support animal letter.  We don't do mental health checks ordered by the court.   We don't do mental health testing, but we can refer you to get tested.   Thank you for choosing us for your care.  For informational purposes only. Not to replace the advice of your health care provider. Copyright   2022 [facility]. All rights reserved. [employer] [id number] - 12/22.

## 2024-05-09 NOTE — PROGRESS NOTES
Mental Health and Collaborative Care Psychiatry Service Rooming Note      Most pressing mental health concern at this time: Unknown      Any new physical health conditions or diagnoses affecting you that we should be aware of: HIV, liver disease Hep C      Side effects related to medications patient would like to discuss with the provider:  Yes, HIV medication damaging his heart.       Are you taking your medications as prescribed?  Yes  If not, why? na      Do you need refills of any of the medications?  na  If so, which ones? na      Are you taking any recreational substances? No, only alcohol      Is there any chance you are pregnant? na  Do you use birth control? na      Provider notified  N/A      Care team has reviewed attendance agreement with patient. Patient advised that two failed appointments within 6 months may lead to termination of current episode of care.      Vicki Fitch RN  May 9, 2024  9:37 AM

## 2024-05-09 NOTE — PROGRESS NOTES
Provider introduced herself.  Introduced role.   used throughout conversation.  Patient states he is unsure why this appointment was scheduled.  He denies symptoms of depression and anxiety.  He denies history of psychosis.  Denies suicidal ideation.  He is not interested in psychiatric medication.  He does continue to consume alcohol but is not interested in stopping.  His main worry is obtaining employment and payment for his family.  Recently moved to the [place] approximately 4 months ago.  He has opted to not continue this appointment.  He has been welcome back at any point.  Offered appointment to addiction medicine to help with alcohol use but patient declined.  Last drink yesterday but is unable to quantify amount exactly.  Denies withdrawal symptoms.  Finds that alcohol has been more limited due to financial barriers.  In addition denies other substance use.  Psychiatric evaluation not completed today. Provider spent approximately 9 minutes with patient.   FAWAD Collins CNP on 5/9/2024 at 10:36 AM

## 2024-05-15 ENCOUNTER — PATIENT OUTREACH (OUTPATIENT)
Dept: CARE COORDINATION | Facility: CLINIC | Age: 29
End: 2024-05-15
Payer: COMMERCIAL

## 2024-05-15 ENCOUNTER — TELEPHONE (OUTPATIENT)
Dept: GASTROENTEROLOGY | Facility: CLINIC | Age: 29
End: 2024-05-15
Payer: COMMERCIAL

## 2024-05-15 NOTE — PROGRESS NOTES
Clinic Care Coordination Contact  Care Coordination Clinician Chart Review    Situation: Patient chart reviewed by Care Coordinator.       Background: Care Coordination Program started: 1/24/2024. Initial assessment completed and patient-centered care plan(s) were developed with participation from patient. Lead CC handed patient off to CHW for continued outreaches.       Assessment: Per chart review, patient outreach completed by CC CHW on 5/7/2024.  Patient is actively working to accomplish goal(s). Patient's goal(s) appropriate and relevant at this time. Patient is not due for updated Plan of Care.  Assessments will be completed annually or as needed/with change of patient status.    Cardiology  - attended appt follow up appt on 4/1 and completed ECHO on 4/9.     Plan  1.  Sildenafil 20 mg 3 times a day  2.  Ambrisentan 5 mg daily increase it to 10 mg in a month  3.  Inhaled treprostinil DPI start at 16 mcg 4 times a day and increase it as tolerated to maximum of 64 mcg 4 times daily on a weekly basis  4.  Digoxin to 50 mcg p.o. daily  5.  Furosemide 20 mg daily  6.  Return to clinic in 4 weeks after starting sildenafil and Ambrisentan with routine labs    Follow-up:   Schedule for pulmonary function test, VQ scan and right heart catheterization with vasodilator, echocardiogram with bubble study    Pulmonology  - PFT and lung scan completed on 4/9    GI/hepatology  - attended appt on 5/1/24 and follow up appt scheduled on 8/7/24 with US and lab.    Psychiatry  - attended appt on 5/9/2024  - Visit notes reviewed. Patient denied depression and anxiety during his visit with psychiatrist on 5/9/24.     ID clinic  - Patient attended his follow up appt with ID clinic on 4/4/2024 and follow up appt scheduled on 7/11/2024.     CCRN plans to follow up with patient via phone in 1-2 weeks on follow up on med compliance and plan to refer to home care skilled nursing if he agrees.     Care Plan: Cardiology       Problem:  Pulmonary hypertension       Goal: Patient will attend his cardiology clinic appointment in the next 6 months.       Start Date: 1/24/2024 Expected End Date: 7/31/2024    Recent Progress: 50%    Note:     Barriers: language barrier, low literacy, noncompliance, and lack of knowledge how to navigate complex health care system  Strengths: motivated to attend appt  Patient expressed understanding of goal: Yes    Action steps to achieve this goal:  1. I will answer my phone when cardiology clinic call to schedule follow up appointment.   2. I will attend my ECHO appointment as scheduled 2/7/2024 at 9:00am. Completed.   3. I will attend my initial cardiology appointment on 4/1/2024 at 10:15am with Andreas Johns MD. Completed.  4. I will attend my appointment for ECHO as scheduled on 4/09/2024 at 11:00 AM. Completed.  5. Patient will attend his 4 weeks follow up appt TBD  6. I will update CCC team at outreach.     Per cardiology clinic protocol that the clinic will reach out to patient directly to schedule an appointment.                             Care Plan: Pulmonology       Problem: Pulmonary hypertension       Goal: Patient will attend his pulmonology clinic appointment in the next 6 months.       Start Date: 1/24/2024 Expected End Date: 7/31/2024    This Visit's Progress: 40% Recent Progress: 30%    Note:     Barriers: language barrier, low literacy, noncompliance, and lack of knowledge how to navigate complex health care system  Strengths: motivated to attend appt  Patient expressed understanding of goal: Yes    Action steps to achieve this goal:  1. I will attend my rescheduled PFT and pulmonology appts on 4/09/2024. Completed.  2. I will answer my phone when pulmonology clinic call to schedule follow up appointment.  3. I will let my [facility] worker/coordinator know once the appointment is scheduled.   4. I will update CCC team know at outreach.    Per Pulmonary clinic's protocol that the clinic or care team  will reach out to patient to schedule follow up appointment.                              Care Plan: ID clinic       Problem: ID clinic       Goal: Patient will attend ID clinic appointment as scheduled in the next 12 months.       Start Date: 3/28/2024 Expected End Date: 12/31/2024    This Visit's Progress: 30% Recent Progress: 20%    Note:     Barriers: language barrier, low literacy, noncompliance, and lack of knowledge how to navigate complex health care system  Strengths: motivated to attend appt  Patient expressed understanding of goal: Yes    Action steps to achieve this goal:  1. I will attend my follow up appointment with infection doctor as scheduled on 7/11/2024.  2. I will schedule follow up appointment if recommended.  3. I will update CCC team at outreach.                             Care Plan: GI       Problem: Chronic hepatitis C without hepatic coma       Goal: Patient will attend GI clinic appointment in the next 12 months.       Start Date: 3/28/2024 Expected End Date: 12/31/2024    Recent Progress: 20%    Note:     Barriers: language barrier, low literacy, noncompliance, and lack of knowledge how to navigate complex health care system  Strengths: motivated to attend appt  Patient expressed understanding of goal: Yes    Action steps to achieve this goal:  1. I will attend my GI appointment as scheduled on 5/1/2024 at 8:00am lab then 8:45 am with Dr Morales. Completed.   2. I will attend my follow up appointment on 8/7/2024 with US and lab.   3. I will schedule a follow up appointment with my provider if it is recommended to do so while I am at the clinic.  4. I will follow up with CCC regarding this goal at each outreach until it is completed.                                  Plan/Recommendations: The patient will continue working with Care Coordination to achieve goal(s) as above. CHW will continue outreaches at minimum every 30 days and will involve Lead CC as needed or if patient is ready to  move to Maintenance. Lead CC will continue to monitor CHW outreaches and patient's progress to goal(s) every 6 weeks.     Plan of Care updated and sent to patient: No

## 2024-05-16 ENCOUNTER — OFFICE VISIT (OUTPATIENT)
Dept: FAMILY MEDICINE | Facility: CLINIC | Age: 29
End: 2024-05-16
Payer: COMMERCIAL

## 2024-05-16 VITALS
HEIGHT: 65 IN | RESPIRATION RATE: 12 BRPM | TEMPERATURE: 99.2 F | WEIGHT: 136.12 LBS | BODY MASS INDEX: 22.68 KG/M2 | SYSTOLIC BLOOD PRESSURE: 94 MMHG | HEART RATE: 85 BPM | OXYGEN SATURATION: 98 % | DIASTOLIC BLOOD PRESSURE: 70 MMHG

## 2024-05-16 DIAGNOSIS — Z23 NEED FOR VACCINATION: ICD-10-CM

## 2024-05-16 DIAGNOSIS — B18.2 CHRONIC HEPATITIS C WITHOUT HEPATIC COMA (H): ICD-10-CM

## 2024-05-16 DIAGNOSIS — Z86.59 HISTORY OF DEPRESSION: ICD-10-CM

## 2024-05-16 DIAGNOSIS — F10.29 ALCOHOL DEPENDENCE WITH UNSPECIFIED ALCOHOL-INDUCED DISORDER (H): ICD-10-CM

## 2024-05-16 DIAGNOSIS — Z86.11 HISTORY OF TUBERCULOSIS: ICD-10-CM

## 2024-05-16 DIAGNOSIS — I27.20 PULMONARY HYPERTENSION (H): Primary | ICD-10-CM

## 2024-05-16 DIAGNOSIS — R63.4 WEIGHT LOSS: ICD-10-CM

## 2024-05-16 DIAGNOSIS — B20 HUMAN IMMUNODEFICIENCY VIRUS (HIV) DISEASE (H): ICD-10-CM

## 2024-05-16 PROBLEM — E46 PROTEIN-CALORIE MALNUTRITION (H): Status: ACTIVE | Noted: 2024-05-16

## 2024-05-16 PROCEDURE — 90471 IMMUNIZATION ADMIN: CPT | Performed by: FAMILY MEDICINE

## 2024-05-16 PROCEDURE — 99214 OFFICE O/P EST MOD 30 MIN: CPT | Mod: 25 | Performed by: FAMILY MEDICINE

## 2024-05-16 PROCEDURE — 90619 MENACWY-TT VACCINE IM: CPT | Performed by: FAMILY MEDICINE

## 2024-05-16 NOTE — PROGRESS NOTES
History of pulmonary hypertension with RV dysfunction class I likely related to HIV, HIV on ART, HCV, alcohol use disorder, possible cirrhosis, opioid use disorder in remission, marijuana use, depression, and anxiety. Here to establish care with new PCP.    Assessment & Plan     Pulmonary hypertension (H)  Seen by cardiology, hadRHC, PFTs, V/Q, TTE 4/9. After these procedues, cardiology started sildenafil 20 TID, ambrisentan 5 daily increase to 10in 1mo, inhaled trepostinil DPI 16mcg QID and increase to max 64mcg QID qweek; digoxin 50mcg, furosemide 20mg. Today he says he is taking 2 pills and does not have the inhaler. He is unable to say which pills he is taking. I discussed with CCRN during clinic visit and he will come in tomorrow to see her with his medications so that she can help ensure he has them all, and assist if he does not. Sh will also help him schedul follow up with cardiology, which has not been done yet.    Chronic hepatitis C without hepatic coma (H)  Seen by GI, has follow up. Plan is for abdominal US with elastography to assess for cirrhosis then treat HCV.    Human immunodeficiency virus (HIV) disease (H)  As above, will confirm he is taking biktarvay.He has follow up with ID.    History of tuberculosis per [place] paperwork, negative quantiferon  He was referred to [facility] for class B1 TB.    Alcohol dependence with unspecified alcohol-induced disorder (H)  He does not identify his drinking as problematic at this point, it does sound like he continues daily drinking. He is able to say that he knows alcohol is affecting his liver. Will continue to discuss and encourage cessation.    Need for vaccination  Given HIV, due for Menquadfi.  - MENINGOCOCCAL (MENQUADFI ) (2 YRS - 55 YRS)    History of depression  He saw psychiatry for history of depression and anxiety and he declined further appointments. Denies symptoms today.    Weight loss  Has lost significant amount of weight - this could be  due to taking furosemide (althouh again, not sure he is taking it). Will continue to monitor. No signs of malnutrition at this point.      I spent a total of 38 minutes on the day of the visit.   Time spent by me doing chart review, history and exam, documentation and further activities per the note        The longitudinal plan of care for the diagnosis(es)/condition(s) as documented were addressed during this visit. Due to the added complexity in care, I will continue to support July in the subsequent management and with ongoing continuity of care.  Follow up with me in 4mos.    Subjective   July is a 28 year old, presenting for the following health issues:  Establish Care      5/16/2024     7:28 AM   Additional Questions   Roomed by osvaldo medina         5/16/2024     7:28 AM    Services Provided    services provided? Yes   Language Sandhya   Type of interpretation provided Telephone    name anel    ID [id number]    Agency Other   Agency name [employer]    phone number [phone]     History of Present Illness       Reason for visit:  Establish care      Understands he had lots of testing for his heart and liver - he was told he has some disease with the heart and the liver  Does not have his medications with him, he has started them and takes them regularly - he says he is taking 2 pills and does not have an inhaler; he is prescribed 5 pills and trepostinil  He understands there is some kind of heart disease  He doesn't understand why the liver is diseased - he thinks it is from smoking and drinking alcohol    Drinks 1 glass daily, 3 days per week he will drink to get drunk, white alcohol, drinks after he eats a meal   He has already decreased alcohol intake - he can't say what has helped him with this, he just has been cutting down on his own.  Denies using drugs currently or in the past    Lives with family: wife and three children (ages  "1y5mo, 7, and 8yo)  Not working  Goal: hopes to have a job, has been months and he needs a job    **cards appt needs scheduling  GI 5/1/24 - HCV/HIV coinfection; alcohol thrombocytopenia palpable liver edge c/f cirrhosis - abd w/elastography then treat HCV with 12w for GLEC-PIB; 9/4 follow up  4/4 - ID for HIV, TB neg quant AFB sputum x3 neg 9/23 (referred to  [facility] as well) - start biktarvy, follow up 7/11  Alcohol, IVDU, marijuana  Depression/anxiety - declined psych  pHTN and RV dysfunction, class I - likely related to HIV - had RHC, PFTs, V/Q, TTE 4/9 and started sildenafil 20 TID, ambrisentan 5 daily increase to 10in 1mo, inhaled trepostinil DPI 16mcg QID and increase to max 64mcg QID qweek; digoxin 50mcg, furosemide 20mg - 4w return to clinic, not scheduled!!      Does have difficulty breathing sometimes - he feels tightness of breathing occasionally and attributes it to asthma  No pain  Appetite - \"I already ate, and it was good\"  Denies overwhelming feelings of sadness or worry            Review of Systems  Constitutional, HEENT, cardiovascular, pulmonary, gi and gu systems are negative, except as otherwise noted.      Objective    BP 94/70   Pulse 85   Temp 99.2  F (37.3  C) (Oral)   Resp 12   Ht 1.662 m (5' 5.43\")   Wt 61.7 kg (136 lb 1.9 oz)   SpO2 98%   BMI 22.35 kg/m    Body mass index is 22.35 kg/m .  Wt Readings from Last 3 Encounters:   05/16/24 61.7 kg (136 lb 1.9 oz)   05/01/24 66.4 kg (146 lb 6.4 oz)   04/09/24 67.3 kg (148 lb 4.8 oz)       Physical Exam   GENERAL: alert and no distress  EYES: Eyes grossly normal to inspection, PERRL and conjunctivae and sclerae normal  HENT: ear canals and TM's normal, nose and mouth without ulcers or lesions  NECK: no adenopathy, no asymmetry, masses, or scars  RESP: lungs clear to auscultation - no rales, rhonchi or wheezes  CV: regular rates and rhythm, no murmur, click or rub, peripheral pulses strong, no peripheral edema, and normal S1 with loud " split S2 and no S3 or S4, no JVD  ABDOMEN: soft, nontender, no hepatosplenomegaly, no masses and bowel sounds normal  MS: no gross musculoskeletal defects noted, no edema  SKIN: no suspicious lesions or rashes  NEURO: Normal strength and tone, mentation intact and speech normal  PSYCH: mentation appears normal, affect normal/bright            Signed Electronically by: Rosalie Adams MD      Prior to immunization administration, verified patients identity using patient s name and date of birth. Please see Immunization Activity for additional information.     Screening Questionnaire for Adult Immunization    Are you sick today?   No   Do you have allergies to medications, food, a vaccine component or latex?   No   Have you ever had a serious reaction after receiving a vaccination?   NO   Do you have a long-term health problem with heart, lung, kidney, or metabolic disease (e.g., diabetes), asthma, a blood disorder, no spleen, complement component deficiency, a cochlear implant, or a spinal fluid leak?  Are you on long-term aspirin therapy?   Yes   Do you have cancer, leukemia, HIV/AIDS, or any other immune system problem?   No   Do you have a parent, brother, or sister with an immune system problem?   No   In the past 3 months, have you taken medications that affect  your immune system, such as prednisone, other steroids, or anticancer drugs; drugs for the treatment of rheumatoid arthritis, Crohn s disease, or psoriasis; or have you had radiation treatments?   No   Have you had a seizure, or a brain or other nervous system problem?   No   During the past year, have you received a transfusion of blood or blood    products, or been given immune (gamma) globulin or antiviral drug?   No   For women: Are you pregnant or is there a chance you could become       pregnant during the next month?   No   Have you received any vaccinations in the past 4 weeks?   No     Immunization questionnaire was positive for at  least one answer.  Notified Dr Braden.      Patient instructed to remain in clinic for 15 minutes afterwards, and to report any adverse reactions.     Screening performed by Allegra Duke MA on 5/16/2024 at 8:39 AM.

## 2024-05-17 ENCOUNTER — ALLIED HEALTH/NURSE VISIT (OUTPATIENT)
Dept: NURSING | Facility: CLINIC | Age: 29
End: 2024-05-17
Payer: COMMERCIAL

## 2024-05-17 DIAGNOSIS — Z71.89 COMPLEX CARE COORDINATION: Primary | ICD-10-CM

## 2024-05-17 PROCEDURE — 99207 PR NO CHARGE LOS: CPT

## 2024-05-17 NOTE — PROGRESS NOTES
Clinic Care Coordination Medication Education Initial Visit    Patient presents for:  Medication education, Pill box teaching, Compliance monitoring, and Medication reconciliation    Language: Sandhya  : Yes    Communication: Illiterate    Accompanied by:      Patient Living Situation:      Primary Care Provider:  Rosalie Adams    Barriers:  Financial, Language, Cultural, Poor insight into disease process, Inadequate support at home, Non-compliance of medications, and Multiple uncontrolled disease states    Medication List (see cited below): Patient presents with all medications EXCEPT lasix, digoxin,     Current Outpatient Medications   Medication Sig Dispense Refill    ambrisentan (LETAIRIS) 5 MG tablet Take 1 tablet (5 mg) by mouth daily 30 tablet 1    bictegravir-emtricitabine-tenofovir (BIKTARVY) -25 MG per tablet Take 1 tablet by mouth daily 30 tablet 11    digoxin (LANOXIN) 250 MCG tablet Take 1 tablet (250 mcg) by mouth daily 30 tablet 11    furosemide (LASIX) 20 MG tablet Take 1 tablet (20 mg) by mouth daily 90 tablet 3    sildenafil (REVATIO) 20 MG tablet Take 1 tablet (20 mg) by mouth 3 times daily 90 tablet 11    Treprostinil (TYVASO DPI TITRATION KIT) 112 x 16MCG & 84 x 32MCG POWD Inhale 16 mcg into the lungs 4 times daily Increase dose weekly as tolerated to max dose of 64mcg. (16, 32, 48, 64) 56 each 11     No current facility-administered medications for this visit.     Compliance: 20%    Future Appointments   Date Time Provider Department Center   5/17/2024 10:00 AM SPRO CCC RN DACSUP [facility] SPRO   7/11/2024  9:20 AM Augustine Tello MD MDINDS [facility]   9/4/2024  7:30 AM UCSCUS3 CUS [facility]   9/4/2024  8:30 AM  LAB UCLABR [facility]   9/4/2024  9:00 AM Nanette Morales MD [facility]   9/17/2024 11:30 AM Rosalie Adams MD DAFMOB [facility] SPRO       Action Plan  RN Will:  5/24/24 AT 9 am.    Nursing Notes:  CCRN met with patient today in clinic for med  teaching, med compliance and assist with med refill. [place] with spouse and 3 children.     Due to language barrier and new to the country, patient agreed to fill ALL his prescription at  mail order pharmacy at this time instead of using 3 different pharmacies of  mail order pharmacy, [facility] and [facility]. Patient can only filled  Bitarvay, ambrisentan and sildenafil at specialty pharmacies.     CCRN spoke with  mail order pharmacy tech today and requested digoxin, sildenafil, furosemide and treprostinil to be transferred to them from [facility] and [facility]. Per pharmacy tech, prescriptions should be delivered by 5/22/2024. Instructed patient to call CCRN directly at [phone] once prescriptions delivered for med teaching. Patient also scheduled to see CCRN in person on 5/24/2204 at 9am.     [facility] pharmacy - [phone]     Digoxin 250mcg - reports hasn't started yet. CCRN spoke with [facility] today and was told that they weren't able to deliver or patient didn't  after 2 weeks so they put it back.     Furosemide 20mg 1 tab daily - reports hasn't started yet. CCRN spoke with [facility] pharmacy today and was told that they weren't able to deliver or patient didn't  after 2 weeks so they put it back.     Ambrisentan 5mg (1) tab daily - reports taking at HS     Treprostinil  inhaler- reports hasn't started yet. CCRN spoke with [facility] pharmacy today and was told that they weren't able to deliver or patient didn't  after 2 weeks so they put it back.     Sildenafil 20mg 1 tab TID- reports hasn't started yet. CCRN spoke with [facility] pharmacy today and was told that they weren't able to deliver or patient didn't  after 2 weeks so they put it back.    -needs to be filled at specialty pharmacy per [facility]     Biktarvy (1) tab daily -reports taking at HS ( last filled by  mail order pharmacy on 4/27/2204)      Special meds: Biktarvy, sildenafil and ambrisentan will be delivered to patient in person     Non specialty meds: Digoxin, lasix and inhaler will be delivered via mail.     Plan to explore home care but patient states he would like to learn how to take it on his own first.

## 2024-05-21 PROBLEM — Z86.59 HISTORY OF DEPRESSION: Status: ACTIVE | Noted: 2024-05-21

## 2024-05-22 ENCOUNTER — TELEPHONE (OUTPATIENT)
Dept: CARDIOLOGY | Facility: CLINIC | Age: 29
End: 2024-05-22
Payer: COMMERCIAL

## 2024-05-22 NOTE — PROGRESS NOTES
CCRN received a call from patient stating that he received 3 prescriptions in the mail and unable to educate patient via phone how to take each meds because he wasn't able to tell what

## 2024-05-22 NOTE — TELEPHONE ENCOUNTER
"Patient had extensive appt yesterday with Rn to review all meds and assist in education and filling.  Based on charting, patient has not started nor received Tyvaso, so I sent email to [facility] asking for update & alerting them that patient needs \"Sandhya\" .  Yaritza Bull RN on 5/22/2024 at 11:53 AM        Provided Emilee with alternate mobile number we have listed for patient.  Asked her to update me on potential transfer ans if they get a hold of patient.  Yaritza Bull RN on 5/22/2024 at 4:12 PM    Pharmacy was able to reach patient yesterday, but requested med list, for which I sent this morning. Yaritza Bull RN on 5/23/2024 at 9:08 AM    "

## 2024-05-24 ENCOUNTER — PATIENT OUTREACH (OUTPATIENT)
Dept: CARE COORDINATION | Facility: CLINIC | Age: 29
End: 2024-05-24

## 2024-05-24 NOTE — PROGRESS NOTES
Clinic Care Coordination Contact  Follow Up Progress Note      Assessment: Patient no showed his med teaching/med set up visit with CCRN at 9am today. Patient called CCRN later today stating that his  came and took him to a job interview.     Patient agreed to come see CCRN on 5/29 at 10am    Plan: Patient will attend his appt with CCRN in clinic on 5/29/24

## 2024-05-30 ENCOUNTER — PATIENT OUTREACH (OUTPATIENT)
Dept: CARE COORDINATION | Facility: CLINIC | Age: 29
End: 2024-05-30
Payer: COMMERCIAL

## 2024-05-30 DIAGNOSIS — B18.2 CHRONIC HEPATITIS C WITHOUT HEPATIC COMA (H): ICD-10-CM

## 2024-05-30 DIAGNOSIS — Z86.59 HISTORY OF DEPRESSION: ICD-10-CM

## 2024-05-30 DIAGNOSIS — J43.8 OTHER EMPHYSEMA (H): Primary | ICD-10-CM

## 2024-05-30 DIAGNOSIS — E46 PROTEIN-CALORIE MALNUTRITION, UNSPECIFIED SEVERITY (H): ICD-10-CM

## 2024-05-30 DIAGNOSIS — F10.29 ALCOHOL DEPENDENCE WITH UNSPECIFIED ALCOHOL-INDUCED DISORDER (H): ICD-10-CM

## 2024-05-30 DIAGNOSIS — B20 HUMAN IMMUNODEFICIENCY VIRUS (HIV) DISEASE (H): ICD-10-CM

## 2024-05-30 DIAGNOSIS — D69.6 THROMBOCYTOPENIA (H): ICD-10-CM

## 2024-05-30 DIAGNOSIS — I27.20 PULMONARY HYPERTENSION (H): ICD-10-CM

## 2024-05-30 NOTE — PROGRESS NOTES
Clinic Care Coordination Medication Education Initial Visit    Patient presents for:  Medication education, Pill box teaching, Compliance monitoring, and Medication reconciliation    Language: Sandhya  : Yes    Communication: Illiterate    Accompanied by:      Patient Living Situation:      Primary Care Provider:  Rosalie Adams    Barriers:  Financial, Language, Cultural, Poor insight into disease process, Inadequate support at home, Non-compliance of medications, Multiple uncontrolled disease states, and Chronic persistent mental illness    Medication List (see cited below): Patient presents with all medications EXCEPT Treprostinil    Current Outpatient Medications   Medication Sig Dispense Refill    ambrisentan (LETAIRIS) 5 MG tablet Take 1 tablet (5 mg) by mouth daily 30 tablet 1    bictegravir-emtricitabine-tenofovir (BIKTARVY) -25 MG per tablet Take 1 tablet by mouth daily 30 tablet 11    digoxin (LANOXIN) 250 MCG tablet Take 1 tablet (250 mcg) by mouth daily 30 tablet 11    furosemide (LASIX) 20 MG tablet Take 1 tablet (20 mg) by mouth daily 90 tablet 3    sildenafil (REVATIO) 20 MG tablet Take 1 tablet (20 mg) by mouth 3 times daily 90 tablet 11    Treprostinil (TYVASO DPI TITRATION KIT) 112 x 16MCG & 84 x 32MCG POWD Inhale 16 mcg into the lungs 4 times daily Increase dose weekly as tolerated to max dose of 64mcg. (16, 32, 48, 64) 56 each 11     No current facility-administered medications for this visit.       Equipment: Basic pill box- four times daily dosing    Pill Box was set up by RN at visit       Medication Set Up: Dependent  Medication Administration:  Independent    Compliance: 50%    Future Appointments   Date Time Provider Department Center   6/6/2024  4:15 PM [facility]   6/6/2024  4:45 PM Sarah Jernigan PA [facility]   7/11/2024  9:20 AM Augustine Tello MD MDINDS [facility]   9/4/2024  7:30 AM [address]US[address] [place] [address]   9/4/2024  8:30 AM [facility]   9/4/2024  9:00 AM Nanette Morales MD UCMGI [facility]   9/17/2024 11:30 AM Rosalie Adams MD DAFCINDY [facility] SPRO       Action Plan  RN Will:  1 month       Nursing Notes:  Provided patient with 4x/day med box today.     Biktarvy (1) tab daily - reports taking at HS    Digoxin 250mg (1) tab - reports has not started yet. Added to AM slots daily     Ambrisentan 5mg (1) tab - reports taking at HS    Furosemide 20mg (1) tab AM - reports hasn't start taking it yet. Added 1 tab to morning slots.     Sildenafil 20mg (1) tab 3x/day -  AM-NOON-HS - reports hasn't started yet. Added 1 tab 3 times per day.     Treprostinil - reports hasn't started yet. Reported that a nurse will come out to see him for 1st initial home visit on 6/6/2024 at 12pm.     Patient agreed to home care referral. Plan to refer patient to [facility]. Message sent to PCP to place home care referral.

## 2024-05-30 NOTE — TELEPHONE ENCOUNTER
"5/30/2024  @ 2:52 PM -  Rcvd email  from Wanda Langford/[facility] on 5/30/2024 -  : \"Hello,   Tyvaso DPI is expected to ship on 6/3/2024.  We are out of network to fill Ambrisentan.  Patient can fill that medication with [facility]. Thank you, Wanda Langford   [facility] \"     Routed to  Nursing :  Yaritza Bull RN - Please send new eRx to [facility], [facility] is out of network - Snapshot updated.       Lisette SAHA CMA- Prior Auths  Cardiology/Pulmonary Hypertension     "

## 2024-05-30 NOTE — TELEPHONE ENCOUNTER
Home care order placed.   Jerrod Adams MD  Family Medicine with Obstetrics  [facility]  05/30/24  4:29 PM

## 2024-05-30 NOTE — TELEPHONE ENCOUNTER
"5/30/2024  @ 3:36 PM -  Rcvd email  from  Wanda Langford/[facility]  on 5/30/2024 -  \"Hello,  Tyvaso DPI is expected to ship on 6/3/2024.  We are out of network to fill Ambrisentan.  Patient can fill that medication with [facility]. Thank you,   Wanda Langford   [facility]  Practice Liaison \"    Lisette SAHA CMA- Prior Auths  Cardiology/Pulmonary Hypertension     "

## 2024-05-31 NOTE — PROGRESS NOTES
EDMONDN [facility]; [phone] today.     [facility]   Address: [address], [place] [address]    Phone: [phone]

## 2024-06-01 DIAGNOSIS — I27.20 PULMONARY HYPERTENSION (H): ICD-10-CM

## 2024-06-01 RX ORDER — TREPROSTINIL 16-32 MCG
16 KIT INHALATION 4 TIMES DAILY
Qty: 56 EACH | Refills: 11 | Status: SHIPPED | OUTPATIENT
Start: 2024-06-01 | End: 2024-09-26 | Stop reason: DRUGHIGH

## 2024-06-06 ENCOUNTER — TELEPHONE (OUTPATIENT)
Dept: CARDIOLOGY | Facility: CLINIC | Age: 29
End: 2024-06-06

## 2024-06-06 DIAGNOSIS — I27.20 PULMONARY HYPERTENSION (H): ICD-10-CM

## 2024-06-06 RX ORDER — AMBRISENTAN 5 MG/1
5 TABLET, FILM COATED ORAL DAILY
Qty: 30 TABLET | Refills: 2 | Status: SHIPPED | OUTPATIENT
Start: 2024-06-06 | End: 2024-09-05

## 2024-06-06 NOTE — TELEPHONE ENCOUNTER
I completed a tyvaso DPI start of care in person with Carolina Torres [birth year] and an in person Sandhya , Jl Hills ( Housing Care coordinator )  . All went will, no issues. It looks like all of his meds are through [facility] with exception to Tyvaso DPI. All pill bottles have a decent amount of pills left and he is using a pill organizer     Reports SOB on occassion, mostly at night. Has fatigue, no swelling noted.     I will plan an in person follow up visit ( he prefers in person vs. phone )  next Wednesday 6/12 and use Video  services at that time.  I can then make sure he titrates to correct dose .    Weight reported 156 lbs, /77, HR 83 , SPO2 99% RA  Post 1 hour Tyvaso dose : /74, HR 86    Here are some contact numbers :    Carolina Torres (patient) [phone]  Jl Hills ( housing care coordinator ) is very helpful and willing to assist is needed  [phone]  His mother, Hansel Rahman (emergency contact)  [phone]    I will see him in person 6/12

## 2024-06-07 ENCOUNTER — ALLIED HEALTH/NURSE VISIT (OUTPATIENT)
Dept: NURSING | Facility: CLINIC | Age: 29
End: 2024-06-07
Payer: COMMERCIAL

## 2024-06-07 DIAGNOSIS — Z71.89 COMPLEX CARE COORDINATION: Primary | ICD-10-CM

## 2024-06-07 PROCEDURE — 99207 PR NO CHARGE LOS: CPT

## 2024-06-07 NOTE — PROGRESS NOTES
Clinic Care Coordination Medication Education Follow - Up Visit    Patient presents for:  Medication education, Pill box teaching, and Compliance monitoring    Language: Sandhya  : Yes    Communication: Illiterate    Accompanied by:      Patient Living Situation:      Primary Care Provider:  Rosalie Adams    Barriers:  Financial, Language, Cultural, Poor insight into disease process, Inadequate support at home, and Multiple uncontrolled disease states    Medication List (see cited below): Patient presents with all ordered medications    Current Outpatient Medications   Medication Sig Dispense Refill    ambrisentan (LETAIRIS) 5 MG tablet Take 1 tablet (5 mg) by mouth daily 30 tablet 2    bictegravir-emtricitabine-tenofovir (BIKTARVY) -25 MG per tablet Take 1 tablet by mouth daily 30 tablet 11    digoxin (LANOXIN) 250 MCG tablet Take 1 tablet (250 mcg) by mouth daily 30 tablet 11    furosemide (LASIX) 20 MG tablet Take 1 tablet (20 mg) by mouth daily 90 tablet 3    sildenafil (REVATIO) 20 MG tablet Take 1 tablet (20 mg) by mouth 3 times daily 90 tablet 11    Treprostinil (TYVASO DPI TITRATION KIT) 112 x 16MCG & 84 x 32MCG POWD Inhale 16 mcg into the lungs 4 times daily Increase dose weekly as tolerated to max dose of 64mcg. (16, 32, 48, 64) 56 each 11     No current facility-administered medications for this visit.       Equipment: Basic pill box- four times daily dosing    Pill Box was set up by RN at visit  today    Medication Set Up: Dependent  Medication Administration:  Independent    Compliance: 100%    Future Appointments   Date Time Provider Department Center   6/7/2024  3:00 PM [facility] RN DACSUP [facility]PRO   7/11/2024  9:20 AM [facility]   9/4/2024  7:30 AM UCSCUS3 CUS [facility]   9/4/2024  8:30 AM  LAB UCLABR [facility]   9/4/2024  9:00 AM Nanette Morales MD [facility]   9/17/2024 11:30 AM Rosalie Adams MD DAFMOB [facility] SPRO       Action  Plan  RN Will:  1 week PRN       Nursing Notes:  med set up x 1 week. Confirmed with [facility] today that they plan to see patient for start of care next week.     Biktarvy (1) tab daily HS     Digoxin 250mg (1) tab AM     Ambrisentan 5mg (1) tab - reports taking at HS     Furosemide 20mg (1) tab AM      Sildenafil 20mg (1) tab 3x/day -  AM-NOON-HS -     CHW to follow up with patient and home care next week on 6/12 to make sure patient is established with them otherwise patient will need to come see CCRN for med set up in clinic.

## 2024-06-11 ENCOUNTER — PATIENT OUTREACH (OUTPATIENT)
Dept: CARE COORDINATION | Facility: CLINIC | Age: 29
End: 2024-06-11
Payer: COMMERCIAL

## 2024-06-11 ENCOUNTER — TELEPHONE (OUTPATIENT)
Dept: CARDIOLOGY | Facility: CLINIC | Age: 29
End: 2024-06-11
Payer: COMMERCIAL

## 2024-06-11 NOTE — TELEPHONE ENCOUNTER
6/13/2024 3:12PM Shea Lopez  Left Voicemail (2nd Attempt) MAX ATTEMPTS REACHED- Unable to LVM for Thida Aye, No MYC, sent letter for the patient to call back and schedule the following:    Appointment type: Return Pulmonary Hypertension  Provider: JEANNE Catalan  Return date: Next available  Specialty phone number: [phone] option 1  Additional appointment(s) needed: labs prior  Additonal Notes: 6/13 LVM (No MYC), unable to LVM for Thida Aye, sent letter for pt to schedule RPH w/ Sarah Jernigan w/ labs prior. NATALIYA Lopez 6/13/2024 3:12PM        6/11/2024 3:52PM Shea John  Left Voicemail (1st Attempt) and Left Voicemail with Family Member (1st Attempt) LVM for pt and [place] Coordinator; No MYC for the patient to call back and schedule the following:    Appointment type: Return Pulmonary Hypertension  Provider: JEANNE Catalan  Return date: Next available  Specialty phone number: [phone] option 1  Additional appointment(s) needed: labs prior  Additonal Notes: 6/11 LVM (No MYC) for pt to schedule RPH w/ Sarah Jernigan w/ lab prior. NATALIYA Lopez 6/11/2024 3:52PM

## 2024-06-11 NOTE — PROGRESS NOTES
Clinic Care Coordination Contact  Community Health Worker Follow Up    -[facility] called back and updated that an assigned RN, Latesha will admit patient as scheduled tomorrow, 6/12/2024 at 1:00 PM.

## 2024-06-11 NOTE — PROGRESS NOTES
Clinic Care Coordination Contact  [facility]/Voicemail    Clinical Data: Care Coordinator Outreach    Outreach Documentation Number of Outreach Attempt   6/11/2024   2:46 PM 1     CHW attempted to call for follow up, unable to reach and left message on patient's voicemail with call back information and requested return call.    Plan: Care Coordinator will try to reach patient again in two weeks.  -Transportation requested for upcoming appointment on 7/11/2024 to follow up with ID clinic.  -CHW called [facility] and stated patient is assigned to a RN for home visit sometimes this week but not sure the date. Message was sent to the assigned RN to call CHW back with updates.

## 2024-06-11 NOTE — TELEPHONE ENCOUNTER
----- Message from Katty Bauer sent at 6/10/2024  8:45 AM CDT -----    ----- Message -----  From: Ana Ojeda RN  Sent: 6/7/2024   2:28 PM CDT  To: Katty Bauer; Yaritza Bull RN    Follow up return PH with labs with di  ----- Message -----  From: Katty Bauer  Sent: 6/7/2024  12:56 PM CDT  To: Ana Ojeda RN    What should we schedule them for?  ----- Message -----  From: Ana Ojeda RN  Sent: 6/6/2024   5:02 PM CDT  To: Ana Ojeda RN; Yaritza Bull, RN; #    Hi team    Can you reschedule patient    [facility]/ARM worker  call her at [phone] for future appointments once there are schedule and they will arranged ride

## 2024-06-12 ENCOUNTER — MEDICAL CORRESPONDENCE (OUTPATIENT)
Dept: HEALTH INFORMATION MANAGEMENT | Facility: CLINIC | Age: 29
End: 2024-06-12
Payer: COMMERCIAL

## 2024-06-13 DIAGNOSIS — R06.09 DOE (DYSPNEA ON EXERTION): ICD-10-CM

## 2024-06-13 DIAGNOSIS — I27.20 PULMONARY HYPERTENSION (H): Primary | ICD-10-CM

## 2024-06-14 ENCOUNTER — TELEPHONE (OUTPATIENT)
Dept: CARDIOLOGY | Facility: CLINIC | Age: 29
End: 2024-06-14
Payer: COMMERCIAL

## 2024-06-14 NOTE — TELEPHONE ENCOUNTER
6/14 Patient confirmed scheduled appointment:  Date: 6/18/2024  Time: 3:45 pm  Visit type: Return Pulmonary Hypertension  Provider: Delon  Location: [facility]  Testing/imaging: labs prior   Additional notes: n/a

## 2024-06-17 DIAGNOSIS — Z53.9 DIAGNOSIS NOT YET DEFINED: Primary | ICD-10-CM

## 2024-06-17 PROCEDURE — G0180 MD CERTIFICATION HHA PATIENT: HCPCS | Performed by: FAMILY MEDICINE

## 2024-06-20 ENCOUNTER — PATIENT OUTREACH (OUTPATIENT)
Dept: CARE COORDINATION | Facility: CLINIC | Age: 29
End: 2024-06-20
Payer: COMMERCIAL

## 2024-06-20 NOTE — PROGRESS NOTES
Clinic Care Coordination Contact  [facility]/Voicemail    Clinical Data: Care Coordinator Outreach    Outreach Documentation Number of Outreach Attempt   6/11/2024   2:46 PM 1   6/20/2024   4:41 PM 2     CHW called, unable to reach and left message on patient's voicemail with call back information and requested return call.    Plan: Care Coordinator will try to reach patient again in 1 month.

## 2024-06-20 NOTE — TELEPHONE ENCOUNTER
6/20/2024  @ 2:13 PM -  Called [facility] @ Phone: [phone] Fax: [phone] - to check ship date of ambrisentan (ordered 6/6/24)     -  Still have Rx, talked to somebody and set  up delivery, pt should receive it on 6/25/24    Routed to  Nursing :  Yaritza Bull, RN    Lisette SAHA CMA- Prior Auths  Cardiology/Pulmonary Hypertension

## 2024-06-27 ENCOUNTER — TELEPHONE (OUTPATIENT)
Dept: CARDIOLOGY | Facility: CLINIC | Age: 29
End: 2024-06-27
Payer: COMMERCIAL

## 2024-06-27 ENCOUNTER — PATIENT OUTREACH (OUTPATIENT)
Dept: CARE COORDINATION | Facility: CLINIC | Age: 29
End: 2024-06-27
Payer: COMMERCIAL

## 2024-06-27 NOTE — PROGRESS NOTES
Clinic Care Coordination Contact  Follow Up Progress Note      Assessment: Per chart review, patient no showed his cardiology appointment on 6/18/24. Patient states transportation didn't show up. Agreed to reschedule. CCRN spoke with cardiology scheduling staff today but was told she needs to send a message to the care team to reschedule appt.     Care Gaps:    Health Maintenance Due   Topic Date Due    ADVANCE CARE PLANNING  Never done    COPD ACTION PLAN  Never done     Care Plans  Care Plan: Cardiology       Problem: Pulmonary hypertension       Goal: Patient will attend his cardiology clinic appointment in the next 12 months.       Start Date: 1/24/2024 Expected End Date: 1/30/2025    Recent Progress: 50%    Note:     Barriers: language barrier, low literacy, noncompliance, and lack of knowledge how to navigate complex health care system  Strengths: motivated to attend appt  Patient expressed understanding of goal: Yes    Action steps to achieve this goal:  1. I will answer my phone when cardiology clinic call to schedule follow up appointment.   2. I will attend my ECHO appointment as scheduled 2/7/2024 at 9:00am. Completed.   3. I will attend my initial cardiology appointment on 4/1/2024 at 10:15am with Andreas Johns MD. Completed.  4. I will attend my appointment for ECHO as scheduled on 4/09/2024 at 11:00 AM. Completed.  5. Patient will attend his 4 weeks follow up appt 6/18/2024. No showed.   6. Patient will attend his follow up cardiology appointment on   7. I will update CCC team at outreach.     Per cardiology clinic protocol that the clinic will reach out to patient directly to schedule an appointment.                             Care Plan: Pulmonology       Problem: Pulmonary hypertension       Goal: Patient will attend his pulmonology clinic appointment in the next 6 months.       Start Date: 1/24/2024 Expected End Date: 7/31/2024    This Visit's Progress: 40% Recent Progress: 30%    Note:      Barriers: language barrier, low literacy, noncompliance, and lack of knowledge how to navigate complex health care system  Strengths: motivated to attend appt  Patient expressed understanding of goal: Yes    Action steps to achieve this goal:  1. I will attend my rescheduled PFT and pulmonology appts on 4/09/2024. Completed.  2. I will answer my phone when pulmonology clinic call to schedule follow up appointment.  3. I will let my [facility] worker/coordinator know once the appointment is scheduled.   4. I will update CCC team know at outreach.    Per Pulmonary clinic's protocol that the clinic or care team will reach out to patient to schedule follow up appointment.                              Care Plan: ID clinic       Problem: ID clinic       Goal: Patient will attend ID clinic appointment as scheduled in the next 12 months.       Start Date: 3/28/2024 Expected End Date: 12/31/2024    This Visit's Progress: 30% Recent Progress: 20%    Note:     Barriers: language barrier, low literacy, noncompliance, and lack of knowledge how to navigate complex health care system  Strengths: motivated to attend appt  Patient expressed understanding of goal: Yes    Action steps to achieve this goal:  1. I will attend my follow up appointment with infection doctor as scheduled on 7/11/2024.  2. I will schedule follow up appointment if recommended.  3. I will update CCC team at outreach.                             Care Plan: GI       Problem: Chronic hepatitis C without hepatic coma       Goal: Patient will attend GI clinic appointment in the next 12 months.       Start Date: 3/28/2024 Expected End Date: 12/31/2024    Recent Progress: 20%    Note:     Barriers: language barrier, low literacy, noncompliance, and lack of knowledge how to navigate complex health care system  Strengths: motivated to attend appt  Patient expressed understanding of goal: Yes    Action steps to achieve this goal:  1. I will attend my GI appointment as  scheduled on 5/1/2024 at 8:00am lab then 8:45 am with Dr Morales. Completed.   2. I will attend my follow up appointment on 8/7/2024 with US and lab.   3. I will schedule a follow up appointment with my provider if it is recommended to do so while I am at the clinic.  4. I will follow up with CCC regarding this goal at each outreach until it is completed.                               Plan: CHW to follow up with patient next week to make sure appt is rescheduled with cardiologist.

## 2024-06-27 NOTE — LETTER
[facility]  Patient Centered Plan of Care  About Me:        Patient Name:  Carolina Torres    YOB: 1995  Age:         29 year old   [facility]vin MRN:    [id number] Telephone Information:  Home Phone [phone]   Mobile [phone]       Address:  [address]  [place] [address] Email address:  No e-mail address on record      Emergency Contact(s)    Name Relationship Lgl Grd Work Phone Home Phone Mobile Phone   1. SOLOMON FELICIANOSHELLY Other    [phone]   2. ORA SHELBY Other    [phone]           Primary language:  Sandhya     needed? Yes   [facility]:  [phone] op. 1  Other communication barriers:Language barrier    Preferred Method of Communication:     Current living arrangement: I live in a private home with family    Mobility Status/ Medical Equipment: Independent        Health Maintenance  Health Maintenance Reviewed: Not assessed      My Access Plan  Medical Emergency 911   Primary Clinic Line [facility] [phone]   24 Hour Appointment Line [phone] or  [phone]) (toll-free)   24 Hour Nurse Line [phone] (toll-free)   Preferred Urgent Care [facility] [phone]     [facility]  [phone]     Preferred Pharmacy [facility] - [address]     Behavioral Health Crisis Line The National Suicide Prevention Lifeline at 1-890.346.6496 or Text/Call 928           My Care Team Members  Patient Care Team         Relationship Specialty Notifications Start End    Rosalie Adams MD PCP - General Family Medicine  4/4/24     Phone: [phone] Fax: [phone]         [address] , [address] [facility] [address]    Nanette Morales MD Fellow Gastroenterology  1/24/24     Phone: [phone] Fax: [phone]         [address] [place] [address] [address] [facility] [address]    Ramon Torres, JACKLYNW  Community Health Worker Primary Care - CC Admissions 1/24/24     Phone: [phone] Fax: [phone]         [address] [place] [address] [address] [facility] [address]    Elizabeth Heaton, RN Lead Care Coordinator Primary Care - CC Admissions 1/24/24     Phone: [phone]         Hayley Andrade MD MD Critical Care  1/26/24      [address] [facility] [address]    Shoaib Mcdonnell MA Medical Assistant   2/2/24     Food Resource Navigator    Minh Zamora MD Assigned PCP   2/23/24     Phone: [phone] Fax: [phone]         [address] [place] [address] [address] [place] [address]    Augustine Tello MD Assigned Infectious Disease Provider   3/15/24     Phone: [phone] Fax: [phone]         [address] [facility] [address] [facility] [address]    Andreas Johns MD MD Cardiovascular Disease Admissions 4/2/24     Phone: [phone] Fax: [phone]         [address] [place] [address]    Ana Ojeda, RN Specialty Care Coordinator Cardiology Admissions 4/2/24     Phone: [phone] Pager: [phone] Fax: [phone]       Jud Khanna, RN Specialty Care Coordinator Cardiology Admissions 4/2/24     Phone: [phone] Pager: [phone] Fax: [phone]       Yaritza Bull, RN Specialty Care Coordinator Cardiology Admissions 4/2/24     Phone: [phone] Pager: [phone] Fax: [phone]       Pratik García, RN Specialty Care Coordinator  Admissions 4/2/24     Phone: [phone] Pager: [phone]        Andreas Johns MD Assigned Heart and Vascular Provider   4/23/24     Phone: [phone] Fax: [phone]         [address] [place] [address]    GoodeCodie Lucas APRN CNP Assigned Behavioral Health Provider   5/23/24     Phone: [phone] Fax: [phone]         [address] [address]                My Care Plans  Self Management and Treatment Plan    Care Plan  Care Plan: Cardiology       Problem: Pulmonary hypertension       Goal: Patient will  attend his cardiology clinic appointment in the next 12 months.       Start Date: 1/24/2024 Expected End Date: 1/30/2025    This Visit's Progress: 60% Recent Progress: 50%    Note:     Barriers: language barrier, low literacy, noncompliance, and lack of knowledge how to navigate complex health care system  Strengths: motivated to attend appt  Patient expressed understanding of goal: Yes    Action steps to achieve this goal:  1. I will answer my phone when cardiology clinic call to schedule follow up appointment.   2. I will attend my ECHO appointment as scheduled 2/7/2024 at 9:00am. Completed.   3. I will attend my initial cardiology appointment on 4/1/2024 at 10:15am with Andreas Johns MD. Completed.  4. I will attend my appointment for ECHO as scheduled on 4/09/2024 at 11:00 AM. Completed.  5. Patient will attend his 4 weeks follow up appt 6/18/2024. No showed and rescheduled.  6. Patient will attend his follow up cardiology appointment on 7/23/2024 for both lab and follow up with provider.  7. I will update CCC team at outreach.     Per cardiology clinic protocol that the clinic will reach out to patient directly to schedule an appointment.                             Care Plan: Pulmonology       Problem: Pulmonary hypertension       Goal: Patient will attend his pulmonology clinic appointment in the next 6 months.       Start Date: 1/24/2024 Expected End Date: 7/31/2024    This Visit's Progress: 50% Recent Progress: 40%    Note:     Barriers: language barrier, low literacy, noncompliance, and lack of knowledge how to navigate complex health care system  Strengths: motivated to attend appt  Patient expressed understanding of goal: Yes    Action steps to achieve this goal:  1. I will attend my rescheduled PFT and pulmonology appts on 4/09/2024. Completed.  2. I will answer my phone when pulmonology clinic call to schedule follow up appointment.  3. I will let my [facility] worker/coordinator know once the  appointment is scheduled.   4. I will update CCC team know at outreach.    Note: Per Pulmonary clinic's protocol that the clinic or care team will reach out to patient to schedule follow up appointment.                              Care Plan: ID clinic       Problem: ID clinic       Goal: Patient will attend ID clinic appointment as scheduled in the next 12 months.       Start Date: 3/28/2024 Expected End Date: 12/31/2024    This Visit's Progress: 40% Recent Progress: 30%    Note:     Barriers: language barrier, low literacy, noncompliance, and lack of knowledge how to navigate complex health care system  Strengths: motivated to attend appt  Patient expressed understanding of goal: Yes    Action steps to achieve this goal:  1. I will attend my follow up appointment with infection doctor as scheduled on 7/11/2024 at 9:05 AM. Transportation arranged with [employer].  2. I will schedule follow up appointment if recommended.  3. I will update CCC team at outreach.                             Care Plan: GI       Problem: Chronic hepatitis C without hepatic coma       Goal: Patient will attend GI clinic appointment in the next 12 months.       Start Date: 3/28/2024 Expected End Date: 12/31/2024    This Visit's Progress: 30% Recent Progress: 20%    Note:     Barriers: language barrier, low literacy, noncompliance, and lack of knowledge how to navigate complex health care system  Strengths: motivated to attend appt  Patient expressed understanding of goal: Yes    Action steps to achieve this goal:  1. I will attend my GI appointment as scheduled on 5/1/2024 at 8:00am lab then 8:45 am with Dr Morales. Completed.   2. I will attend my follow up appointment on 8/7/2024 with US and lab. Cancelled and rescheduled on 9/04/2024 and follow up with provider.   3. I will schedule a follow up appointment with my provider if it is recommended to do so while I am at the clinic.  4. I will follow up with [facility] regarding this goal at each  outreach until it is completed.                               Action Plans on File:                       Advance Care Plans/Directives:   Advanced Care Plan/Directives on file:   No    Discussed with patient/caregiver(s):   Declined Further Information             My Medical and Care Information  Problem List   Patient Active Problem List   Diagnosis    Other emphysema (H)    History of tuberculosis per overseas paperwork, negative quantiferon    Chronic hepatitis C without hepatic coma (H)    Human immunodeficiency virus (HIV) disease (H)    Pulmonary hypertension (H)    Refugee health examination    Anemia, unspecified type    Thrombocytopenia (H24)    Alcohol dependence with unspecified alcohol-induced disorder (H)    Protein-calorie malnutrition (H)    History of depression      Current Medications and Allergies:  See printed Medication Report.    Care Coordination Start Date: 1/24/2024   Frequency of Care Coordination: monthly, more frequently as needed     Form Last Updated: 07/19/2024

## 2024-06-27 NOTE — TELEPHONE ENCOUNTER
Patient confirmed scheduled appointment:  Date: 7/23  Time: 1:15  Visit type: [facility]  Provider: di  [place] [facility]  Testing/imaging: labs prior  Additional notes:

## 2024-06-27 NOTE — TELEPHONE ENCOUNTER
Health Call Center    Phone Message    May a detailed message be left on voicemail: yes     Reason for Call: Other: Elizabeth called on behalf of the patient to schedule a return ph appt. Please call back to further coordinate. If you are unable to reach Elizabeth via the phone, please send her an in basket message.     Action Taken: Message routed to:  Other: Cardiology    Travel Screening: Not Applicable     Thank you!  [facility]

## 2024-06-28 ENCOUNTER — TELEPHONE (OUTPATIENT)
Dept: CARDIOLOGY | Facility: CLINIC | Age: 29
End: 2024-06-28
Payer: COMMERCIAL

## 2024-07-01 ENCOUNTER — PATIENT OUTREACH (OUTPATIENT)
Dept: CARE COORDINATION | Facility: CLINIC | Age: 29
End: 2024-07-01
Payer: COMMERCIAL

## 2024-07-01 NOTE — PROGRESS NOTES
Clinic Care Coordination Contact  Community Health Worker Follow Up    Care Gaps:   Health Maintenance Due   Topic Date Due    ADVANCE CARE PLANNING  Never done    COPD ACTION PLAN  Never done     Care Gap due.    Care Plan:   Care Plan: Cardiology       Problem: Pulmonary hypertension       Goal: Patient will attend his cardiology clinic appointment in the next 12 months.       Start Date: 1/24/2024 Expected End Date: 1/30/2025    This Visit's Progress: 60% Recent Progress: 50%    Note:     Barriers: language barrier, low literacy, noncompliance, and lack of knowledge how to navigate complex health care system  Strengths: motivated to attend appt  Patient expressed understanding of goal: Yes    Action steps to achieve this goal:  1. I will answer my phone when cardiology clinic call to schedule follow up appointment.   2. I will attend my ECHO appointment as scheduled 2/7/2024 at 9:00am. Completed.   3. I will attend my initial cardiology appointment on 4/1/2024 at 10:15am with Andreas Johns MD. Completed.  4. I will attend my appointment for ECHO as scheduled on 4/09/2024 at 11:00 AM. Completed.  5. Patient will attend his 4 weeks follow up appt 6/18/2024. No showed and rescheduled.  6. Patient will attend his follow up cardiology appointment on 7/23/2024 for both lab and follow up with provider.  7. I will update CCC team at outreach.     Per cardiology clinic protocol that the clinic will reach out to patient directly to schedule an appointment.                             Care Plan: Pulmonology       Problem: Pulmonary hypertension       Goal: Patient will attend his pulmonology clinic appointment in the next 6 months.       Start Date: 1/24/2024 Expected End Date: 7/31/2024    This Visit's Progress: 50% Recent Progress: 40%    Note:     Barriers: language barrier, low literacy, noncompliance, and lack of knowledge how to navigate complex health care system  Strengths: motivated to attend appt  Patient  expressed understanding of goal: Yes    Action steps to achieve this goal:  1. I will attend my rescheduled PFT and pulmonology appts on 4/09/2024. Completed.  2. I will answer my phone when pulmonology clinic call to schedule follow up appointment.  3. I will let [facility] worker[facility]coordinator know once the appointment is scheduled.   4. I will update CCC team know at outreach.    Note: Per Pulmonary clinic's protocol that the clinic or care team will reach out to patient to schedule follow up appointment.                              Care Plan: ID clinic       Problem: ID clinic       Goal: Patient will attend ID clinic appointment as scheduled in the next 12 months.       Start Date: 3/28/2024 Expected End Date: 12/31/2024    This Visit's Progress: 40% Recent Progress: 30%    Note:     Barriers: language barrier, low literacy, noncompliance, and lack of knowledge how to navigate complex health care system  Strengths: motivated to attend appt  Patient expressed understanding of goal: Yes    Action steps to achieve this goal:  1. I will attend my follow up appointment with infection doctor as scheduled on 7/11/2024 at 9:05 AM. Transportation arranged with [employer].  2. I will schedule follow up appointment if recommended.  3. I will update CCC team at outreach.                             Care Plan: GI       Problem: Chronic hepatitis C without hepatic coma       Goal: Patient will attend GI clinic appointment in the next 12 months.       Start Date: 3/28/2024 Expected End Date: 12/31/2024    This Visit's Progress: 30% Recent Progress: 20%    Note:     Barriers: language barrier, low literacy, noncompliance, and lack of knowledge how to navigate complex health care system  Strengths: motivated to attend appt  Patient expressed understanding of goal: Yes    Action steps to achieve this goal:  1. I will attend my GI appointment as scheduled on 5/1/2024 at 8:00am lab then 8:45 am with Dr Morales. Completed.   2. I  will attend my follow up appointment on 8/7/2024 with US and lab. Cancelled and rescheduled on 9/04/2024 and follow up with provider.   3. I will schedule a follow up appointment with my provider if it is recommended to do so while I am at the clinic.  4. I will follow up with CCC regarding this goal at each outreach until it is completed.                           Intervention and Education during outreach:  -Patient has new address and updated Epic.  -CHW informed [facility] for transportation .  -Patient was informed to call with questions or concerns.     CHW Next Outreach: In one month.

## 2024-07-11 ENCOUNTER — LAB (OUTPATIENT)
Dept: LAB | Facility: CLINIC | Age: 29
End: 2024-07-11
Payer: COMMERCIAL

## 2024-07-11 ENCOUNTER — OFFICE VISIT (OUTPATIENT)
Dept: INFECTIOUS DISEASES | Facility: CLINIC | Age: 29
End: 2024-07-11
Payer: COMMERCIAL

## 2024-07-11 VITALS
BODY MASS INDEX: 23.01 KG/M2 | DIASTOLIC BLOOD PRESSURE: 72 MMHG | TEMPERATURE: 98.5 F | SYSTOLIC BLOOD PRESSURE: 111 MMHG | WEIGHT: 140.1 LBS | OXYGEN SATURATION: 98 % | HEART RATE: 79 BPM

## 2024-07-11 DIAGNOSIS — B20 HUMAN IMMUNODEFICIENCY VIRUS (HIV) DISEASE (H): Primary | ICD-10-CM

## 2024-07-11 DIAGNOSIS — I27.20 PULMONARY HYPERTENSION (H): ICD-10-CM

## 2024-07-11 DIAGNOSIS — B20 HUMAN IMMUNODEFICIENCY VIRUS (HIV) DISEASE (H): ICD-10-CM

## 2024-07-11 DIAGNOSIS — R06.09 DOE (DYSPNEA ON EXERTION): ICD-10-CM

## 2024-07-11 LAB
ALBUMIN SERPL BCG-MCNC: 4.2 G/DL (ref 3.5–5.2)
ALP SERPL-CCNC: 167 U/L (ref 40–150)
ALT SERPL W P-5'-P-CCNC: 176 U/L (ref 0–70)
ANION GAP SERPL CALCULATED.3IONS-SCNC: 12 MMOL/L (ref 7–15)
AST SERPL W P-5'-P-CCNC: 463 U/L (ref 0–45)
BASOPHILS # BLD AUTO: 0.1 10E3/UL (ref 0–0.2)
BASOPHILS NFR BLD AUTO: 1 %
BILIRUB SERPL-MCNC: 1.8 MG/DL
BUN SERPL-MCNC: 11.7 MG/DL (ref 6–20)
CALCIUM SERPL-MCNC: 9.6 MG/DL (ref 8.6–10)
CD3 CELLS # BLD: 1379 CELLS/UL (ref 603–2990)
CD3 CELLS NFR BLD: 77 % (ref 49–84)
CD3+CD4+ CELLS # BLD: 441 CELLS/UL (ref 441–2156)
CD3+CD4+ CELLS NFR BLD: 25 % (ref 28–63)
CD3+CD4+ CELLS/CD3+CD8+ CLL BLD: 0.49 % (ref 1.4–2.6)
CD3+CD8+ CELLS # BLD: 899 CELLS/UL (ref 125–1312)
CD3+CD8+ CELLS NFR BLD: 50 % (ref 10–40)
CHLORIDE SERPL-SCNC: 92 MMOL/L (ref 98–107)
CREAT SERPL-MCNC: 0.81 MG/DL (ref 0.67–1.17)
DEPRECATED HCO3 PLAS-SCNC: 30 MMOL/L (ref 22–29)
EGFRCR SERPLBLD CKD-EPI 2021: >90 ML/MIN/1.73M2
EOSINOPHIL # BLD AUTO: 0.3 10E3/UL (ref 0–0.7)
EOSINOPHIL NFR BLD AUTO: 5 %
ERYTHROCYTE [DISTWIDTH] IN BLOOD BY AUTOMATED COUNT: 12.8 % (ref 10–15)
GLUCOSE SERPL-MCNC: 82 MG/DL (ref 70–99)
HCT VFR BLD AUTO: 36.1 % (ref 40–53)
HGB BLD-MCNC: 12.6 G/DL (ref 13.3–17.7)
IMM GRANULOCYTES # BLD: 0 10E3/UL
IMM GRANULOCYTES NFR BLD: 0 %
LYMPHOCYTES # BLD AUTO: 1.9 10E3/UL (ref 0.8–5.3)
LYMPHOCYTES NFR BLD AUTO: 33 %
MCH RBC QN AUTO: 34.2 PG (ref 26.5–33)
MCHC RBC AUTO-ENTMCNC: 34.9 G/DL (ref 31.5–36.5)
MCV RBC AUTO: 98 FL (ref 78–100)
MONOCYTES # BLD AUTO: 0.8 10E3/UL (ref 0–1.3)
MONOCYTES NFR BLD AUTO: 14 %
NEUTROPHILS # BLD AUTO: 2.8 10E3/UL (ref 1.6–8.3)
NEUTROPHILS NFR BLD AUTO: 47 %
NT-PROBNP SERPL-MCNC: 331 PG/ML (ref 0–450)
PLATELET # BLD AUTO: 142 10E3/UL (ref 150–450)
POTASSIUM SERPL-SCNC: 4.3 MMOL/L (ref 3.4–5.3)
PROT SERPL-MCNC: 9.3 G/DL (ref 6.4–8.3)
RBC # BLD AUTO: 3.68 10E6/UL (ref 4.4–5.9)
SODIUM SERPL-SCNC: 134 MMOL/L (ref 135–145)
T CELL COMMENT: ABNORMAL
WBC # BLD AUTO: 5.9 10E3/UL (ref 4–11)

## 2024-07-11 PROCEDURE — T1013 SIGN LANG/ORAL INTERPRETER: HCPCS | Mod: U4

## 2024-07-11 PROCEDURE — 80053 COMPREHEN METABOLIC PANEL: CPT

## 2024-07-11 PROCEDURE — 86359 T CELLS TOTAL COUNT: CPT

## 2024-07-11 PROCEDURE — G2211 COMPLEX E/M VISIT ADD ON: HCPCS | Performed by: INTERNAL MEDICINE

## 2024-07-11 PROCEDURE — 83880 ASSAY OF NATRIURETIC PEPTIDE: CPT

## 2024-07-11 PROCEDURE — 99214 OFFICE O/P EST MOD 30 MIN: CPT | Performed by: INTERNAL MEDICINE

## 2024-07-11 PROCEDURE — 86360 T CELL ABSOLUTE COUNT/RATIO: CPT

## 2024-07-11 PROCEDURE — 36415 COLL VENOUS BLD VENIPUNCTURE: CPT

## 2024-07-11 PROCEDURE — 85025 COMPLETE CBC W/AUTO DIFF WBC: CPT

## 2024-07-11 PROCEDURE — 87536 HIV-1 QUANT&REVRSE TRNSCRPJ: CPT

## 2024-07-11 NOTE — PROGRESS NOTES
[facility] INFECTIOUS DISEASE CLINIC [facility]    Date: 07/11/2024   Patient Name: Carolina Torres   YOB: 1995  MRN: [id number]      ASSESSMENT:  29 year old man referred to ID clinic for HIV management.    HIV infection.  Diagnosed in [facility] ~2023.  Outside records show a CD4 count of 788 in July 2023.  Duration of infection unclear.  Initially on combination pill of dolutegravir, lamivudine, tenofovir DF.  Viral load undetectable and CD4 count 589 at initial visit. No pretreatment genotype available.  Patient reports acquisition from sharing needles.  Unknown history of opportunistic infections.  Wife and 1 daughter are also HIV positive and on treatment. Currently on Biktarvy, missing 1-2 doses a week  TB screening completed. Negative quantiferon test. AFB sputum x 3 negative Sept 2023  Hepatitis C.  Presumed from IV drug use.  Genotype 3A.  Viral load 8,590,000.  Has referral placed to GI for May.  Hepatitis B surface antigen negative with immunity from previous infection (positive core and surface antibodies).  Also has documentation of vaccination on 4/5/2013, 6/6/2013, and 7/17/2023 hepatitis A vaccine x 1 on 2/1/2024. Followed by GI, has not started treatment yet  Pulmonary hypertension.  Diagnosed prior to arrival.  Outpatient chest x-ray with cardiomegaly.  Seeing cardiology  Chronic tobacco use. Decreased use  EtOH use. Regular use  History of polysubstance abuse, including IV drug use.  Reports sobriety    PLAN:  -continue biktarvy 1 pill daily. Discussed importance to regular adherence  -Reviewed upcoming appts. Encourage making all appts, especially with hepatology.   -Discussed importance of EtOH cessation  -cmp, cbc, viral load, CD4 count today    Return to clinic in 3 months.    Augustine Tello MD  [facility]   Clinic phone: [phone]   Clinic fax: [phone]     ______________________________________________________________________    HISTORY OF PRESENT  ILLNESS:   From initial visit on 2/29/24:    Carolina Torres is a 28 year old man who is referred for evaluation of HIV.  Patient recently immigrated from [facility] in January of this year.  He was recently evaluated in primary care clinic and referred to ID due to a diagnosis of HIV.  He has extensive immigration records that are scanned and were reviewed today.  The patient has a known history of polysubstance abuse, including IV drug use.  He believes this is how he was infected with HIV.  Timeline of infection is unclear.  He denies any significant opportunistic infections in the past.  He does tell me that he was diagnosed over the past year and started on treatment.  He is on triple drug therapy.  He takes this regularly, but does miss occasional doses.  He is currently living with his wife and 3 children.  2 of his children are school-age and 1 is 16 months old.  His wife and middle daughter are both diagnosed with HIV and currently on treatment.  Today he has no acute complaints.  He does not have any questions regarding HIV diagnosis.  He knows that he needs to stay on his medications.  He does have a lesion on his nose that has been present for about a week.  He says that this started out as a pimple and is now draining.  He is not taking any medicine for this.    Outside records were reviewed which show workup for tuberculosis with sputum AFB smear and culture x 3 in September 2023.  Smear and cultures were negative.  He has a recent chest x-ray which indicates cardiomegaly without any other report of cavitary lesions or infiltrates.  He is known to be positive for hepatitis C.  HIV test was positive on 7/17/2023 (unknown if there were prior positive test).  Additional labs in July showed negative hepatitis B surface antigen, negative treponemal antibody.  An ultrasound of the liver showed enlarged liver smooth surface with normal parenchymal echogenicity without gross focal lesion.  Enlarged intrahepatic IVC and  hepatic vein.  No intrahepatic bile duct dilation is observed.  The visualized proximal CBD is measured about 0.3 cm.  Gallbladder is well-distended without gross stone.  Diffuse gallbladder wall thickening is noted.  The visualized part of pancreas appeared normal.  Spleen is measured about 11 cm in length.  Both kidneys are of normal size and echotexture.  Small ascites is noted.    Additional labs done following moved to [place] include negative QuantiFERON test, negative treponemal antibody, negative hepatitis B surface antigen, positive hepatitis B core and surface antibody, positive hepatitis C antibody with elevated viral load, genotype 3A.  Normal kidney and liver testing (mildly elevated AST)      Interval History:  Here for follow-up. Feels well, no new complaints. Intermittent chest pain, but not recently. Taking HIV med daily, but sometimes will oversleep and miss a dose. This may happen 1-2 times/week. He does not drink every week, but if he does it is 1 or 2 times during the week. He reports intoxication with drinking. Denies substance abuse. Occaisional smoking, but less than before      Seen with phone     Review of Systems:  No fevers or chills      Past Medical History:  Past Medical History:   Diagnosis Date    Alcohol dependence (H)     Cannabis abuse     Current smoker     Hepatitis C     Human immunodeficiency virus (HIV) disease (H) 2019    upon arrival is on Dolutegravir/Lamivudine/Tenovir 50/300/300  one daily; CD4 788 (7/23)    Opioid use disorder, moderate, in sustained remission (H)     diagnosis [place] in [place] 1/[place]24    Other ascites     Pulmonary hypertension (H)     severely impaired right ventricular systolic function    Reaction to QuantiFERON-TB test (QFT) without active tuberculosis     class B1 upon [place] to [place]    Tattoos     right hand, left leg       Past Surgical History:  Past Surgical History:   Procedure Laterality Date    CV RIGHT HEART CATH  MEASUREMENTS RECORDED N/A 4/9/2024    Procedure: Heart Cath Right Heart Cath;  Surgeon: Andreas Johns MD;  Location:  HEART CARDIAC CATH LAB    CV RIGHT HEART CATH PULMONARY VASODILATOR STUDY N/A 4/9/2024    Procedure: Right Heart Cath Pulmonary Vasodilator Study;  Surgeon: Andreas Johns MD;  Location:  HEART CARDIAC CATH LAB       Allergies:  No Known Allergies    Medications:    Current Outpatient Medications:     ambrisentan (LETAIRIS) 5 MG tablet, Take 1 tablet (5 mg) by mouth daily, Disp: 30 tablet, Rfl: 2    bictegravir-emtricitabine-tenofovir (BIKTARVY) -25 MG per tablet, Take 1 tablet by mouth daily, Disp: 30 tablet, Rfl: 11    digoxin (LANOXIN) 250 MCG tablet, Take 1 tablet (250 mcg) by mouth daily, Disp: 30 tablet, Rfl: 11    furosemide (LASIX) 20 MG tablet, Take 1 tablet (20 mg) by mouth daily, Disp: 90 tablet, Rfl: 3    sildenafil (REVATIO) 20 MG tablet, Take 1 tablet (20 mg) by mouth 3 times daily, Disp: 90 tablet, Rfl: 11    Treprostinil (TYVASO DPI TITRATION KIT) 112 x 16MCG & 84 x 32MCG POWD, Inhale 16 mcg into the lungs 4 times daily Increase dose weekly as tolerated to max dose of 64mcg. (16, 32, 48, 64), Disp: 56 each, Rfl: 11    Social History:  Social History     Socioeconomic History    Marital status:      Spouse name: Not on file    Number of children: Not on file    Years of education: Not on file    Highest education level: Not on file   Occupational History    Not on file   Tobacco Use    Smoking status: Every Day     Types: Cigarettes     Passive exposure: Current    Smokeless tobacco: Current     Types: Chew    Tobacco comments:     Betel nut with tobacco    Vaping Use    Vaping Use: Never used   Substance and Sexual Activity    Alcohol use: Not on file    Drug use: Not on file    Sexual activity: Not on file   Other Topics Concern    Not on file   Social History Narrative    As of 1/2024        [place]: 1[place]        Marrital status:  to   Henrik Griffin ( 1997) as of         Living situation: lives in an apartment with spouse and 3 kids            Languages spoken: Jac Arthur            Past employment: farmer            Place of birth: [place]    Years in a refugee [place]: 27 (all life until [place] to [place]        Family not in [place]:     Family in [place]:         Education previous [place] the [place]: [place] 2    Education started in the [place]:none            Alevism: not asked        A#: [phone]         Social Determinants of Health     Financial Resource Strain: Low Risk  (2024)    Financial Resource Strain     Within the past 12 months, have you or your family members you live with been unable to get utilities (heat, electricity) when it was really needed?: No   Food Insecurity: Low Risk  (2024)    Food Insecurity     Within the past 12 months, did you worry that your food would run out before you got money to buy more?: No     Within the past 12 months, did the food you bought just not last and you didn t have money to get more?: No   Transportation Needs: Low Risk  (2024)    Transportation Needs     Within the past 12 months, has lack of transportation kept you from medical appointments, getting your medicines, non-medical meetings or appointments, work, or from getting things that you need?: No   Physical Activity: Not on file   Stress: Not on file   Social Connections: Not on file   Interpersonal Safety: Low Risk  (2024)    Interpersonal Safety     Do you feel physically and emotionally safe where you currently live?: Yes     Within the past 12 months, have you been hit, slapped, kicked or otherwise physically hurt by someone?: No     Within the past 12 months, have you been humiliated or emotionally abused in other ways by your partner or ex-partner?: No   Housing Stability: Low Risk  (2024)    Housing Stability     Do you have housing? : Yes     Are you worried about losing your housing?: No         PHYSICAL EXAM:    /72   Pulse 79   Temp 98.5  F (36.9  C) (Oral)   Wt 63.5 kg (140 lb 1.6 oz)   SpO2 98%   BMI 23.01 kg/m      GENERAL:  well-developed, well-nourished, in no acute distress.   HENT:  Head is normocephalic, atraumatic. Oropharynx is moist without exudates or ulcers.  No thrush.  Terrible dentition.  Healed nose lesion  EYES:  Eyes have anicteric sclerae without conjunctival injection or stigmata of endocarditis.    NECK:  Supple.   LUNGS:  Clear to auscultation.  CARDIOVASCULAR:  Regular rate and rhythm with no murmurs, gallops or rubs.  ABDOMEN:  Normal bowel sounds, soft, nontender.  Mild right upper quadrant tenderness  MUSCULOSKELETAL: Extremities warm and without edema. No joint swelling.  SKIN:  No acute rashes.   NEUROLOGIC: Grossly nonfocal. Normal gait and station.          Pertinent labs:    Lab Results   Component Value Date     05/01/2024    POTASSIUM 3.7 05/01/2024    CHLORIDE 108 (H) 05/01/2024    CO2 21 (L) 05/01/2024    BUN 8.4 05/01/2024    CR 0.68 05/01/2024     (H) 05/01/2024       Lab Results   Component Value Date    WBC 6.7 05/01/2024    HGB 13.7 05/01/2024    HCT 39.8 (L) 05/01/2024    PLT 84 (L) 05/01/2024    MCV 94 05/01/2024    RDW 15.2 (H) 05/01/2024        Lab Results   Component Value Date    BILITOTAL 1.1 05/01/2024     (H) 05/01/2024     (H) 05/01/2024    PROTTOTAL 8.2 05/01/2024    ALBUMIN 3.2 (L) 05/01/2024    ALKPHOS 294 (H) 05/01/2024    INR 1.21 (H) 04/09/2024            MICROBIOLOGY DATA:  None    RADIOLOGY:  Reviewed      Attestation:  Total time preparing to see this patient, face-to-face time, and coordinating care time on the same calendar date: 21 minutes.  Face-face time: 12 minutes.  Over 50% of face-to-face time was spent in counseling/coordination of care.     The longitudinal plan of care for the diagnosis(es)/condition(s) as documented were addressed during this visit. Due to the added complexity in care, I will  continue to support July in the subsequent management and with ongoing continuity of care.

## 2024-07-13 LAB
HIV1 RNA # PLAS NAA DL=20: 223 COPIES/ML
HIV1 RNA SERPL NAA+PROBE-LOG#: 2.3 {LOG_COPIES}/ML

## 2024-07-16 ENCOUNTER — VIRTUAL VISIT (OUTPATIENT)
Dept: INTERPRETER SERVICES | Facility: CLINIC | Age: 29
End: 2024-07-16
Payer: COMMERCIAL

## 2024-07-18 ENCOUNTER — TRANSFERRED RECORDS (OUTPATIENT)
Dept: HEALTH INFORMATION MANAGEMENT | Facility: CLINIC | Age: 29
End: 2024-07-18
Payer: COMMERCIAL

## 2024-08-01 ENCOUNTER — PATIENT OUTREACH (OUTPATIENT)
Dept: CARE COORDINATION | Facility: CLINIC | Age: 29
End: 2024-08-01
Payer: COMMERCIAL

## 2024-08-01 ENCOUNTER — TELEPHONE (OUTPATIENT)
Dept: CARDIOLOGY | Facility: CLINIC | Age: 29
End: 2024-08-01
Payer: COMMERCIAL

## 2024-08-01 NOTE — PROGRESS NOTES
Clinic Care Coordination Contact  Community Health Worker Follow Up    Care Gaps:   Health Maintenance Due   Topic Date Due    ADVANCE CARE PLANNING  Never done    COPD ACTION PLAN  Never done    MENINGITIS IMMUNIZATION (2 - Risk 2-dose series) 07/11/2024    HEPATITIS A IMMUNIZATION (2 of 2 - Risk 2-dose series) 08/01/2024     PCP to address other medical care gaps at the next follow up visit.     Care Plan:   Care Plan: Cardiology       Problem: Pulmonary hypertension       Goal: Patient will attend his cardiology clinic appointment in the next 12 months.       Start Date: 1/24/2024 Expected End Date: 1/30/2025    This Visit's Progress: 60% Recent Progress: 60%    Note:     Barriers: language barrier, low literacy, noncompliance, and lack of knowledge how to navigate complex health care system  Strengths: motivated to attend appt  Patient expressed understanding of goal: Yes    Action steps to achieve this goal:  1. I will answer my phone when cardiology clinic call to schedule follow up appointment.   2. I will attend my ECHO appointment as scheduled 2/7/2024 at 9:00am. Completed.   3. I will attend my initial cardiology appointment on 4/1/2024 at 10:15am with Andreas Johns MD. Completed.  4. I will attend my appointment for ECHO as scheduled on 4/09/2024 at 11:00 AM. Completed.  5. Patient will attend his follow up cardiology appointment on 7/23/2024. No showed and rescheduled on 8/20/2024 at 8:00 AM, transportation requested to [facility].   6. I will update CCC team at outreach.                             Care Plan: Pulmonology       Problem: Pulmonary hypertension       Goal: Patient will attend his pulmonology clinic appointment in the next 6 months.       Start Date: 1/24/2024 Expected End Date: 7/31/2024    This Visit's Progress: 60% Recent Progress: 50%    Note:     Barriers: language barrier, low literacy, noncompliance, and lack of knowledge how to navigate complex health care  system  Strengths: motivated to attend appt  Patient expressed understanding of goal: Yes    Action steps to achieve this goal:  1. I will attend my rescheduled PFT and pulmonology appts on 4/09/2024. Completed.  2. I will answer my phone when pulmonology clinic call to schedule follow up appointment.  3. I will let my [facility] worker/coordinator know once the appointment is scheduled.   4. I will update CCC team know at outreach.    Note: Per Pulmonary clinic's protocol that the clinic or care team will reach out to patient to schedule follow up appointment.                              Care Plan: ID clinic       Problem: ID clinic       Goal: Patient will attend ID clinic appointment as scheduled in the next 12 months.       Start Date: 3/28/2024 Expected End Date: 12/31/2024    This Visit's Progress: 50% Recent Progress: 40%    Note:     Barriers: language barrier, low literacy, noncompliance, and lack of knowledge how to navigate complex health care system  Strengths: motivated to attend appt  Patient expressed understanding of goal: Yes    Action steps to achieve this goal:  1. I will attend my follow up appointment with infection doctor as scheduled on 7/11/2024 at 9:05 AM. Completed.  2. I will attend my follow up appointment as scheduled on 10/24/2024 at 9:45 AM.  3. I will update CCC team at outreach.                             Care Plan: GI       Problem: Chronic hepatitis C without hepatic coma       Goal: Patient will attend GI clinic appointment in the next 12 months.       Start Date: 3/28/2024 Expected End Date: 12/31/2024    This Visit's Progress: 40% Recent Progress: 30%    Note:     Barriers: language barrier, low literacy, noncompliance, and lack of knowledge how to navigate complex health care system  Strengths: motivated to attend appt  Patient expressed understanding of goal: Yes    Action steps to achieve this goal:  1. I will attend my GI appointment as scheduled on 5/1/2024 at 8:00am lab then  8:45 am with Dr Morales. Completed.   2. I will attend my follow up appointment on 9/04/2024 at 8:40 AM and will attend lab appointments prior.   3. I will schedule a follow up appointment with my provider if it is recommended to do so while I am at the clinic.  4. I will follow up with CCC regarding this goal at each outreach until it is completed.                           Intervention and Education during outreach:  -CHW assisted with no showed return pulmonary appointment and requested transportation.   -Per patient, [place], has medications and receiving home care for skills nursing.   -Patient was informed to call with questions or concerns.     CHW Next Outreach: In one month.

## 2024-08-01 NOTE — TELEPHONE ENCOUNTER
[facility] Call Center    Phone Message    May a detailed message be left on voicemail: yes     Reason for Call: Order(s): Other:   Reason for requested: Lab orders to be completed prior to 8.20.24  Date needed: as soon as possible  Provider name: Sarah Jernigan    NOTE:  pt will do labs at [facility]    Action Taken: Message routed to:  [facility] cardio    Travel Screening: Not Applicable    Thank you!  [facility]       Date of Service:

## 2024-08-06 ENCOUNTER — TELEPHONE (OUTPATIENT)
Dept: CARDIOLOGY | Facility: CLINIC | Age: 29
End: 2024-08-06
Payer: COMMERCIAL

## 2024-08-06 NOTE — TELEPHONE ENCOUNTER
No labs scheduled or resulted.  Sent msg to Coordinators asking them to call patient & get labs scheduled for upcoming appt.  Yaritza Bull RN on 8/6/2024 at 10:17 AM

## 2024-08-06 NOTE — TELEPHONE ENCOUNTER
8/6 Patient confirmed scheduled appointment:  Date: 8/20/2024  Time: 8:00 am  Visit type: Labs  Provider: Delon  [place] [facility]  Testing/imaging: N/A  Additional notes: N/A

## 2024-08-09 ENCOUNTER — APPOINTMENT (OUTPATIENT)
Dept: INTERPRETER SERVICES | Facility: CLINIC | Age: 29
End: 2024-08-09
Payer: COMMERCIAL

## 2024-08-14 ENCOUNTER — PATIENT OUTREACH (OUTPATIENT)
Dept: CARE COORDINATION | Facility: CLINIC | Age: 29
End: 2024-08-14
Payer: COMMERCIAL

## 2024-08-14 NOTE — PROGRESS NOTES
Clinic Care Coordination Contact  Care Coordination Clinician Chart Review    Situation: Patient chart reviewed by Care Coordinator.       Background: Care Coordination Program started: 1/24/2024. Initial assessment completed and patient-centered care plan(s) were developed with participation from patient. Lead CC handed patient off to CHW for continued outreaches.       Assessment: Per chart review, patient outreach completed by CC CHW on 8/1/2024.  Patient is actively working to accomplish goal(s). Patient's goal(s) appropriate and relevant at this time. Patient is not due for updated Plan of Care.  Assessments will be completed annually or as needed/with change of patient status.    Cardiology  Per chart review, patient is scheduled to follow up with his cardiologist on 8/20/24.     Pulmonology   Per chart review, no showed on 7/23/24. CHW to assist patient reschedule appt. Goal updated.     ID clinic follow up  Per chart review, patient is scheduled to follow up with ID clinic on 10/24/24.     GI/Hepatology  Per chart review, patient is scheduled to follow up with hepatology clinic on 9/4/24.      Care Plan: Cardiology       Problem: Pulmonary hypertension       Goal: Patient will attend his cardiology clinic appointment in the next 12 months.       Start Date: 1/24/2024 Expected End Date: 1/30/2025    Recent Progress: 60%    Note:     Barriers: language barrier, low literacy, noncompliance, and lack of knowledge how to navigate complex health care system  Strengths: motivated to attend appt  Patient expressed understanding of goal: Yes    Action steps to achieve this goal:  1. I will answer my phone when cardiology clinic call to schedule follow up appointment.   2. I will attend my ECHO appointment as scheduled 2/7/2024 at 9:00am. Completed.   3. I will attend my initial cardiology appointment on 4/1/2024 at 10:15am with Andreas Johns MD. Completed.  4. I will attend my appointment for ECHO as  scheduled on 4/09/2024 at 11:00 AM. Completed.  5. Patient will attend his follow up cardiology appointment on 7/23/2024. No showed and rescheduled on 8/20/2024 at 8:00 AM, transportation requested to [facility].   6. I will attend my follow up cardiology appointment on 8/20/24 at 8:00am.   7. I will update CCC team at outreach.                             Care Plan: Pulmonology       Problem: Pulmonary hypertension       Goal: Patient will attend his pulmonology clinic appointment in the next 12 months.       Start Date: 1/24/2024 Expected End Date: 1/31/2025    Recent Progress: 60%    Note:     Barriers: language barrier, low literacy, noncompliance, and lack of knowledge how to navigate complex health care system  Strengths: motivated to attend appt  Patient expressed understanding of goal: Yes    Action steps to achieve this goal:  1. I will attend my rescheduled PFT and pulmonology appts on 4/09/2024. Completed.  2. I will attend my follow up pulmonology appt on 7/23/24. No showed.   3. I will let my [facility] worker/coordinator know once the appointment is scheduled.   4. I will update CCC team know at outreach.    Note: Per Pulmonary clinic's protocol that the clinic or care team will reach out to patient to schedule follow up appointment.                              Care Plan: ID clinic       Problem: ID clinic       Goal: Patient will attend ID clinic appointment as scheduled in the next 12 months.       Start Date: 3/28/2024 Expected End Date: 12/31/2024    This Visit's Progress: 50% Recent Progress: 40%    Note:     Barriers: language barrier, low literacy, noncompliance, and lack of knowledge how to navigate complex health care system  Strengths: motivated to attend appt  Patient expressed understanding of goal: Yes    Action steps to achieve this goal:  1. I will attend my follow up appointment with infection doctor as scheduled on 7/11/2024 at 9:05 AM. Completed.  2. I will attend my follow up  appointment as scheduled on 10/24/2024 at 9:45 AM.  3. I will update CCC team at outreach.                             Care Plan: GI       Problem: Chronic hepatitis C without hepatic coma       Goal: Patient will attend GI clinic appointment in the next 12 months.       Start Date: 3/28/2024 Expected End Date: 12/31/2024    Recent Progress: 40%    Note:     Barriers: language barrier, low literacy, noncompliance, and lack of knowledge how to navigate complex health care system  Strengths: motivated to attend appt  Patient expressed understanding of goal: Yes    Action steps to achieve this goal:  1. I will attend my GI appointment as scheduled on 5/1/2024 at 8:00am lab then 8:45 am with Dr Morales. Completed.   2. I will attend my follow up appointment on 9/04/2024 at 8:40 AM and will attend lab appointments prior.   3. I will schedule a follow up appointment with my provider if it is recommended to do so while I am at the clinic.  4. I will follow up with CCC regarding this goal at each outreach until it is completed.                               Plan/Recommendations: The patient will continue working with Care Coordination to achieve goal(s) as above. CHW will continue outreaches at minimum every 30 days and will involve Lead CC as needed or if patient is ready to move to Maintenance. Lead CC will continue to monitor CHW outreaches and patient's progress to goal(s) every 6 weeks.     Plan of Care updated and sent to patient: No

## 2024-08-16 DIAGNOSIS — R06.09 DOE (DYSPNEA ON EXERTION): ICD-10-CM

## 2024-08-16 DIAGNOSIS — I27.20 PULMONARY HYPERTENSION (H): Primary | ICD-10-CM

## 2024-08-19 NOTE — PROGRESS NOTES
Arrived late for appt and was unable to be seen.      Sarah Jernigan PA-C  [facility]  Pager [phone]

## 2024-08-20 ENCOUNTER — OFFICE VISIT (OUTPATIENT)
Dept: CARDIOLOGY | Facility: CLINIC | Age: 29
End: 2024-08-20
Attending: PHYSICIAN ASSISTANT
Payer: COMMERCIAL

## 2024-08-20 DIAGNOSIS — I27.20 PULMONARY HYPERTENSION (H): Primary | ICD-10-CM

## 2024-08-20 PROCEDURE — 99207 PR NO BILLABLE SERVICE THIS VISIT: CPT | Performed by: PHYSICIAN ASSISTANT

## 2024-08-20 NOTE — LETTER
8/20/2024      RE: Carolina Torres  [address]  [place] [address]       Dear Colleague,    Thank you for the opportunity to participate in the care of your patient, Carolina Torres, at the [facility] [place] [facility]. Please see a copy of my visit note below.          Arrived late for appt and was unable to be seen.      Sarah Jernigan PA-C  [facility]  Pager [phone]            Please do not hesitate to contact me if you have any questions/concerns.     Sincerely,     JEANNE Catalan

## 2024-08-21 ENCOUNTER — TELEPHONE (OUTPATIENT)
Dept: CARDIOLOGY | Facility: CLINIC | Age: 29
End: 2024-08-21
Payer: COMMERCIAL

## 2024-08-21 NOTE — TELEPHONE ENCOUNTER
8/21/2024 10:49AM Shea Lopez  Patient confirmed scheduled appointment:  Date: 8/26/2024  Time: 8:30AM  Visit type: Return Pulmonary Hypertension  Provider: Dr. Johns  Location: [address] [place] [address]  Testing/imaging: Labs (1st Floor Imaging) prior at 7:45AM  Additional notes: 8/21 Scheduled [facility] w/ Dr. Johns 8/26 w/ labs prior. Pt informed he must arrive on time. Removed hold. Cancelled 9/3 appt w/ Sarah Jernigan w/ labs prior. MJ     Patient confirmed cancelled appointment:  Date: 9/3/2024  Time: 3:45PM  Visit type: Return Pulmonary Hypertension  Provider: JEANNE Catalan  Location: [address], [address] [place] [address] [place] [address]  Testing/imaging: Labs (1st Floor Imaging) prior at 3:30PM  Additional notes: 8/21 Scheduled [facility] w/ Dr. Johns 8/26 w/ labs prior. Pt informed he must arrive on time. Removed hold. Cancelled 9/3 appt w/ Sarah Jernigan w/ labs prior. NATALIYA Lopez 8/21/2024 10:49AM

## 2024-08-26 ENCOUNTER — TELEPHONE (OUTPATIENT)
Dept: CARDIOLOGY | Facility: CLINIC | Age: 29
End: 2024-08-26

## 2024-08-26 NOTE — TELEPHONE ENCOUNTER
[facility] Call Center    Phone Message    May a detailed message be left on voicemail: yes     Reason for Call: Other: Please share message w/Yaritza Lopez.  PT missed his appt today due to lack of planning transportation.  Ramon Torres - Care Coordinator at clinic pt goes to called on pt's behalf.  PT was provided a Held appt for today and Ramon Torres is requesting another Held appt to replace the one pt currently is scheduled for on Oct. 21, 2024.  Please contact Ramon Torres at [phone] if there are any other held appts that pt might qualify for.      Action Taken: Message routed to:  [facility] cardio    Travel Screening: Not Applicable    Thank you!  [facility]       Date of Service:

## 2024-08-27 ENCOUNTER — PATIENT OUTREACH (OUTPATIENT)
Dept: CARE COORDINATION | Facility: CLINIC | Age: 29
End: 2024-08-27
Payer: COMMERCIAL

## 2024-08-27 NOTE — PROGRESS NOTES
Clinic Care Coordination Contact  Community Health Worker Follow Up    Care Gaps:   Health Maintenance Due   Topic Date Due    ADVANCE CARE PLANNING  Never done    COPD ACTION PLAN  Never done    MENINGITIS IMMUNIZATION (2 - Risk 2-dose series) 07/11/2024    HEPATITIS A IMMUNIZATION (2 of 2 - Risk 2-dose series) 08/01/2024     PCP to address other care gaps with patient at the next follow up visit.    Care Plan:   Care Plan: Cardiology       Problem: Pulmonary hypertension       Goal: Patient will attend his cardiology clinic appointment in the next 12 months.       Start Date: 1/24/2024 Expected End Date: 1/30/2025    This Visit's Progress: 60% Recent Progress: 60%    Note:     Barriers: language barrier, low literacy, noncompliance, and lack of knowledge how to navigate complex health care system  Strengths: motivated to attend appt  Patient expressed understanding of goal: Yes    Action steps to achieve this goal:  1. I will answer my phone when cardiology clinic call to schedule follow up appointment.   2. I will attend my ECHO appointment as scheduled 2/7/2024 at 9:00am. Completed.   3. I will attend my initial cardiology appointment on 4/1/2024 at 10:15am with Andreas Johns MD. Completed.  4. I will attend my appointment for ECHO as scheduled on 4/09/2024 at 11:00 AM. Completed.  5. I will attend my follow up cardiology appointment on 8/20/24 at 8:00am. Completed.  6. Patient will attend his follow up cardiology appointment on 10/21/2024 at 8:45 AM.  7. I will update CCC team at outreach.                             Care Plan: Pulmonology       Problem: Pulmonary hypertension       Goal: Patient will attend his pulmonology clinic appointment in the next 12 months.       Start Date: 1/24/2024 Expected End Date: 1/31/2025    This Visit's Progress: 60% Recent Progress: 60%    Note:     Barriers: language barrier, low literacy, noncompliance, and lack of knowledge how to navigate complex health care  system  Strengths: motivated to attend appt  Patient expressed understanding of goal: Yes    Action steps to achieve this goal:  1. I will attend my rescheduled PFT and pulmonology appts on 4/09/2024. Completed.  2. I will attend my follow up pulmonology appt on 7/23/24. No showed rescheduled on 9/12/2024 at 2:15 PM for lab and with provider at 2:30 PM.  3. I will let my [facility] worker/coordinator know once the appointment is scheduled.   4. I will update [facility] team know at outreach.    Note: Per Pulmonary clinic's protocol that the clinic or care team will reach out to patient to schedule follow up appointment.                              Care Plan: ID clinic       Problem: ID clinic       Goal: Patient will attend ID clinic appointment as scheduled in the next 12 months.       Start Date: 3/28/2024 Expected End Date: 12/31/2024    This Visit's Progress: 50% Recent Progress: 50%    Note:     Barriers: language barrier, low literacy, noncompliance, and lack of knowledge how to navigate complex health care system  Strengths: motivated to attend appt  Patient expressed understanding of goal: Yes    Action steps to achieve this goal:  1. I will attend my follow up appointment with infection doctor as scheduled on 7/11/2024 at 9:05 AM. Completed.  2. I will attend my follow up appointment as scheduled on 10/24/2024 at 9:45 AM.  3. I will update [facility] team at outreach.                             Care Plan: GI       Problem: Chronic hepatitis C without hepatic coma       Goal: Patient will attend GI clinic appointment in the next 12 months.       Start Date: 3/28/2024 Expected End Date: 12/31/2024    This Visit's Progress: 40% Recent Progress: 40%    Note:     Barriers: language barrier, low literacy, noncompliance, and lack of knowledge how to navigate complex health care system  Strengths: motivated to attend appt  Patient expressed understanding of goal: Yes    Action steps to achieve this goal:  1. I will attend my GI  appointment as scheduled on 5/1/2024 at 8:00am lab then 8:45 am with Dr Morales. Completed.   2. I will attend my follow up appointment on 9/04/2024 at 8:40 AM and will attend lab appointments prior. Ride arranged with [facility].  3. I will schedule a follow up appointment with my provider if it is recommended to do so while I am at the clinic.  4. I will follow up with CCC regarding this goal at each outreach until it is completed.                           Intervention and Education during outreach:  -Rescheduled pulmonary follow up appointment and arranged transportation.   -Patient was informed to call with questions or concerns.     CHW Next Outreach: In one month.

## 2024-08-30 DIAGNOSIS — B18.2 CHRONIC HEPATITIS C WITHOUT HEPATIC COMA (H): Primary | ICD-10-CM

## 2024-09-03 ENCOUNTER — MEDICAL CORRESPONDENCE (OUTPATIENT)
Dept: HEALTH INFORMATION MANAGEMENT | Facility: CLINIC | Age: 29
End: 2024-09-03

## 2024-09-05 DIAGNOSIS — I27.20 PULMONARY HYPERTENSION (H): ICD-10-CM

## 2024-09-06 RX ORDER — AMBRISENTAN 5 MG/1
5 TABLET, FILM COATED ORAL DAILY
Qty: 30 TABLET | Refills: 5 | Status: SHIPPED | OUTPATIENT
Start: 2024-09-06 | End: 2024-09-26

## 2024-09-06 NOTE — TELEPHONE ENCOUNTER
Medication Refill double check:    Last 2A visit was on 4/9/24 with .    Follow up was recommended for 3 months.    Any additional encounters with changes to requested med? no    Authorizing provider is: Dr. Johns    Refill was approved.     Additional orders/notes:       Yaritza Bull RN on 9/6/2024 at 8:11 AM

## 2024-09-12 ENCOUNTER — LAB (OUTPATIENT)
Dept: LAB | Facility: CLINIC | Age: 29
End: 2024-09-12
Payer: COMMERCIAL

## 2024-09-12 DIAGNOSIS — B18.2 CHRONIC HEPATITIS C WITHOUT HEPATIC COMA (H): ICD-10-CM

## 2024-09-12 DIAGNOSIS — I27.20 PULMONARY HYPERTENSION (H): ICD-10-CM

## 2024-09-12 DIAGNOSIS — R06.09 DOE (DYSPNEA ON EXERTION): ICD-10-CM

## 2024-09-12 DIAGNOSIS — Z53.9 DIAGNOSIS NOT YET DEFINED: Primary | ICD-10-CM

## 2024-09-12 LAB
ALBUMIN SERPL BCG-MCNC: 4.4 G/DL (ref 3.5–5.2)
ALP SERPL-CCNC: 188 U/L (ref 40–150)
ALT SERPL W P-5'-P-CCNC: 228 U/L (ref 0–70)
ANION GAP SERPL CALCULATED.3IONS-SCNC: 14 MMOL/L (ref 7–15)
AST SERPL W P-5'-P-CCNC: 359 U/L (ref 0–45)
BILIRUB DIRECT SERPL-MCNC: 0.33 MG/DL (ref 0–0.3)
BILIRUB SERPL-MCNC: 0.9 MG/DL
BUN SERPL-MCNC: 9.4 MG/DL (ref 6–20)
CALCIUM SERPL-MCNC: 9.8 MG/DL (ref 8.8–10.4)
CHLORIDE SERPL-SCNC: 99 MMOL/L (ref 98–107)
CREAT SERPL-MCNC: 0.75 MG/DL (ref 0.67–1.17)
DIGOXIN SERPL-MCNC: 0.5 NG/ML (ref 0.6–2)
EGFRCR SERPLBLD CKD-EPI 2021: >90 ML/MIN/1.73M2
ERYTHROCYTE [DISTWIDTH] IN BLOOD BY AUTOMATED COUNT: 12.8 % (ref 10–15)
GLUCOSE SERPL-MCNC: 90 MG/DL (ref 70–99)
HCO3 SERPL-SCNC: 25 MMOL/L (ref 22–29)
HCT VFR BLD AUTO: 40.8 % (ref 40–53)
HCV AB SERPL QL IA: REACTIVE
HGB BLD-MCNC: 13.9 G/DL (ref 13.3–17.7)
INR PPP: 0.92 (ref 0.85–1.15)
MCH RBC QN AUTO: 33.6 PG (ref 26.5–33)
MCHC RBC AUTO-ENTMCNC: 34.1 G/DL (ref 31.5–36.5)
MCV RBC AUTO: 99 FL (ref 78–100)
NT-PROBNP SERPL-MCNC: 132 PG/ML (ref 0–450)
PLATELET # BLD AUTO: 147 10E3/UL (ref 150–450)
POTASSIUM SERPL-SCNC: 3.5 MMOL/L (ref 3.4–5.3)
PROT SERPL-MCNC: 9.3 G/DL (ref 6.4–8.3)
RBC # BLD AUTO: 4.14 10E6/UL (ref 4.4–5.9)
SODIUM SERPL-SCNC: 138 MMOL/L (ref 135–145)
WBC # BLD AUTO: 6.2 10E3/UL (ref 4–11)

## 2024-09-12 PROCEDURE — 86803 HEPATITIS C AB TEST: CPT | Performed by: STUDENT IN AN ORGANIZED HEALTH CARE EDUCATION/TRAINING PROGRAM

## 2024-09-12 PROCEDURE — 87902 NFCT AGT GNTYP ALYS HEP C: CPT | Mod: 90 | Performed by: PATHOLOGY

## 2024-09-12 PROCEDURE — 80162 ASSAY OF DIGOXIN TOTAL: CPT | Performed by: INTERNAL MEDICINE

## 2024-09-12 PROCEDURE — 87522 HEPATITIS C REVRS TRNSCRPJ: CPT | Performed by: STUDENT IN AN ORGANIZED HEALTH CARE EDUCATION/TRAINING PROGRAM

## 2024-09-12 PROCEDURE — G0179 MD RECERTIFICATION HHA PT: HCPCS | Performed by: FAMILY MEDICINE

## 2024-09-14 LAB
HCV RNA SERPL NAA+PROBE-ACNC: ABNORMAL IU/ML
HCV RNA SERPL NAA+PROBE-LOG IU: 7.1 {LOG_IU}/ML

## 2024-09-16 LAB
LABORATORY REPORT: NORMAL
PETH INTERPRETATION: NORMAL
PLPETH BLD-MCNC: 1492 NG/ML
POPETH BLD-MCNC: 1515 NG/ML

## 2024-09-17 ENCOUNTER — OFFICE VISIT (OUTPATIENT)
Dept: FAMILY MEDICINE | Facility: CLINIC | Age: 29
End: 2024-09-17
Attending: FAMILY MEDICINE
Payer: COMMERCIAL

## 2024-09-17 VITALS
SYSTOLIC BLOOD PRESSURE: 124 MMHG | BODY MASS INDEX: 24.03 KG/M2 | OXYGEN SATURATION: 99 % | HEART RATE: 86 BPM | WEIGHT: 144.25 LBS | RESPIRATION RATE: 12 BRPM | TEMPERATURE: 98.9 F | HEIGHT: 65 IN | DIASTOLIC BLOOD PRESSURE: 84 MMHG

## 2024-09-17 DIAGNOSIS — B20 HUMAN IMMUNODEFICIENCY VIRUS (HIV) DISEASE (H): ICD-10-CM

## 2024-09-17 DIAGNOSIS — I27.20 PULMONARY HYPERTENSION (H): ICD-10-CM

## 2024-09-17 DIAGNOSIS — F10.20 ALCOHOL USE DISORDER, SEVERE, DEPENDENCE (H): Primary | ICD-10-CM

## 2024-09-17 DIAGNOSIS — B18.2 CHRONIC HEPATITIS C WITHOUT HEPATIC COMA (H): ICD-10-CM

## 2024-09-17 DIAGNOSIS — Z23 NEED FOR VACCINATION: ICD-10-CM

## 2024-09-17 PROBLEM — E46 PROTEIN-CALORIE MALNUTRITION (H): Status: RESOLVED | Noted: 2024-05-16 | Resolved: 2024-09-17

## 2024-09-17 PROCEDURE — 90471 IMMUNIZATION ADMIN: CPT | Performed by: FAMILY MEDICINE

## 2024-09-17 PROCEDURE — 99214 OFFICE O/P EST MOD 30 MIN: CPT | Mod: 25 | Performed by: FAMILY MEDICINE

## 2024-09-17 PROCEDURE — 90632 HEPA VACCINE ADULT IM: CPT | Performed by: FAMILY MEDICINE

## 2024-09-17 PROCEDURE — 90619 MENACWY-TT VACCINE IM: CPT | Performed by: FAMILY MEDICINE

## 2024-09-17 PROCEDURE — 90656 IIV3 VACC NO PRSV 0.5 ML IM: CPT | Performed by: FAMILY MEDICINE

## 2024-09-17 PROCEDURE — 90472 IMMUNIZATION ADMIN EACH ADD: CPT | Performed by: FAMILY MEDICINE

## 2024-09-17 RX ORDER — ACAMPROSATE CALCIUM 333 MG/1
666 TABLET, DELAYED RELEASE ORAL 3 TIMES DAILY
Qty: 180 TABLET | Refills: 3 | Status: SHIPPED | OUTPATIENT
Start: 2024-09-17

## 2024-09-17 NOTE — PROGRESS NOTES
28yo male with History of pulmonary hypertension with RV dysfunction class I likely related to HIV, HIV on ART, HCV, alcohol use disorder, possible cirrhosis, opioid use disorder in remission, marijuana use, depression, and anxiety. Presenting for follow up.    Assessment & Plan     Alcohol use disorder, severe, dependence (H)  His goal is abstinence.  He understands that this is good for his health.  He declines referral for group therapy.  He is interested in starting medication for alcohol use.  We discussed that ideally he would be abstinent before starting acamprosate, but he finds that this is difficult to do.  We will try starting acamprosate.  He is not a candidate for naltrexone at this time given his liver dysfunction.  - acamprosate (CAMPRAL) 333 MG EC tablet; Take 2 tablets (666 mg) by mouth 3 times daily.    Need for vaccination  - INFLUENZA VACCINE,SPLIT VIRUS,TRIVALENT,PF(FLUZONE)  - MENINGOCOCCAL (MENQUADFI ) (2 YRS - 55 YRS)  - HEPATITIS A 19+ (HAVRIX/VAQTA)    Chronic hepatitis C without hepatic coma (H)  He no showed his appointment with GI and has not had his liver elastography done.  Message to care coordination to help him reschedule.    Human immunodeficiency virus (HIV) disease (H)  He has follow-up with ID in October.  He is taking Biktarvy and has been remembering his medicines more now that he has a home care nurse.    Pulmonary hypertension (H)  He reports that his symptoms are significantly improved since starting medication.  He has follow-up with cardiology in October.  He unfortunately no showed an appointment in August            Follow-up alcohol use in 3 months.      Subjective   July is a 29 year old, presenting for the following health issues:  Follow Up      9/17/2024    11:08 AM   Additional Questions   Roomed by DWAIN Breen   Accompanied by self     History of Present Illness       Reason for visit:  F/u      No issues with meds since having home care RN set up  "meds  Using inhaler    No shortness of breath, chest pain  All symptoms are gone    US elastography not scheduled, no GI follow up scheduled    Missed appointment with cardiology    Drinking alcohol here and there, cutting back  Drinks every day, 1 cup, only if he has at home - twice a day  Not drinking to get drunk anymore  No withdrawal symptoms  He would like to stop entirely  Does not think group would be helpful, he has people to talk to at home                Review of Systems  Constitutional, HEENT, cardiovascular, pulmonary, gi and gu systems are negative, except as otherwise noted.      Objective    BP (!) 135/90 (BP Location: Left arm, Patient Position: Sitting, Cuff Size: Adult Regular)   Pulse 86   Temp 98.9  F (37.2  C) (Oral)   Resp 12   Ht 1.662 m (5' 5.43\")   Wt 65.4 kg (144 lb 4 oz)   SpO2 99%   BMI 23.69 kg/m    Body mass index is 23.69 kg/m .  Wt Readings from Last 3 Encounters:   09/17/24 65.4 kg (144 lb 4 oz)   07/11/24 63.5 kg (140 lb 1.6 oz)   05/16/24 61.7 kg (136 lb 1.9 oz)       Physical Exam   General: well-appearing, no acute distress  HENT: normocephalic, atraumatic  Eyes: no conjunctival injection, no scleral icterus, EOMI  Neck: normal ROM, supple  Cardiovascular: regular rate  Pulmonary: normal effort  Abdomen: non-distended  Musculoskeletal: grossly normal ROM, no deformity  Extremities: no lower extremity edema  Psych: mood/affect normal         Prior to immunization administration, verified patients identity using patient s name and date of birth. Please see Immunization Activity for additional information.     Screening Questionnaire for Adult Immunization    Are you sick today?   No   Do you have allergies to medications, food, a vaccine component or latex?   No   Have you ever had a serious reaction after receiving a vaccination?   No   Do you have a long-term health problem with heart, lung, kidney, or metabolic disease (e.g., diabetes), asthma, a blood disorder, no " spleen, complement component deficiency, a cochlear implant, or a spinal fluid leak?  Are you on long-term aspirin therapy?   Yes. Liver and heart per pt   Do you have cancer, leukemia, HIV/AIDS, or any other immune system problem?   Yes. HIV   Do you have a parent, brother, or sister with an immune system problem?   No   In the past 3 months, have you taken medications that affect  your immune system, such as prednisone, other steroids, or anticancer drugs; drugs for the treatment of rheumatoid arthritis, Crohn s disease, or psoriasis; or have you had radiation treatments?   No   Have you had a seizure, or a brain or other nervous system problem?   No   During the past year, have you received a transfusion of blood or blood    products, or been given immune (gamma) globulin or antiviral drug?   No   For women: Are you pregnant or is there a chance you could become       pregnant during the next month?   No   Have you received any vaccinations in the past 4 weeks?   No     Immunization questionnaire was positive for at least 2 answer.  Okay to give vaccine per MD.      Patient instructed to remain in clinic for 15 minutes afterwards, and to report any adverse reactions.     Screening performed by Damion Rivas MA on 9/17/2024 at 12:22 PM.         Signed Electronically by: Rosalie Adams MD

## 2024-09-17 NOTE — Clinical Note
Hi Ramon, patient no showed his hepatology appointment and also needs an ultrasound elastography of liver (ordered 5/2024) done before his appointment with them - are you able to help him schedule these? Thank you!

## 2024-09-17 NOTE — PROGRESS NOTES
His appointments have been rescheduled along with ultrasound, transportation arranged. He also missed his cardiology appointment and this also has been rescheduled. His liver appointment won't be until January 2025 as they're booked out.

## 2024-09-18 ENCOUNTER — PATIENT OUTREACH (OUTPATIENT)
Dept: CARE COORDINATION | Facility: CLINIC | Age: 29
End: 2024-09-18
Payer: COMMERCIAL

## 2024-09-18 LAB — HCV GENTYP SERPL NAA+PROBE: NORMAL

## 2024-09-18 NOTE — PROGRESS NOTES
Clinic Care Coordination Contact  Care Coordination Clinician Chart Review    Situation: Patient chart reviewed by Care Coordinator.       Background: Care Coordination Program started: 1/24/2024. Initial assessment completed and patient-centered care plan(s) were developed with participation from patient. Lead CC handed patient off to CHW for continued outreaches.       Assessment: Per chart review, patient outreach completed by CC CHW on 8/27/24.  Patient is actively working to accomplish goal(s). Patient's goal(s) appropriate and relevant at this time. Patient is not due for updated Plan of Care.  Assessments will be completed annually or as needed/with change of patient status.    Cardiology follow up   Per chart review, patient is scheduled to follow up with his cardiologist on 10/21/2024 at 7:45am for lab and 8:45am with cardiologist. Goal updated.     Pulmonology follow up  Per chart review, patient attended lab appt on 0/12 but missed appt with his pulmonologist. Appt rescheduled on 9/26/24 at 2:00pm. Transportation already set up. Goal updated.     ID clinic follow up  Patient is scheduled to follow up with ID clinic on 10/24/24 at 9:45am. Goal up to date.    Hepatology   Patient no showed on 9/4 and appt rescheduled on 1/8/25 with lab. Goal updated.       Care Plan: Cardiology       Problem: Pulmonary hypertension       Goal: Patient will attend his cardiology clinic appointment in the next 12 months.       Start Date: 1/24/2024 Expected End Date: 1/30/2025    Recent Progress: 60%    Note:     Barriers: language barrier, low literacy, noncompliance, and lack of knowledge how to navigate complex health care system  Strengths: motivated to attend appt  Patient expressed understanding of goal: Yes    Action steps to achieve this goal:  1. I will answer my phone when cardiology clinic call to schedule follow up appointment.   2. I will attend my ECHO appointment as scheduled 2/7/2024 at 9:00am. Completed.   3.  I will attend my initial cardiology appointment on 4/1/2024 at 10:15am with Andreas Johns MD. Completed.  4. I will attend my appointment for ECHO as scheduled on 4/09/2024 at 11:00 AM. Completed.  5. I will attend my follow up cardiology appointment on 8/20/24 at 8:00am. Completed.  6. Patient will attend his follow up cardiology appointment on 10/21/2024 at 7:45am for lab and 8:45 AM with Dr Johns.  7. I will update CCC team at outreach.                             Care Plan: Pulmonology       Problem: Pulmonary hypertension       Goal: Patient will attend his pulmonology clinic appointment in the next 12 months.       Start Date: 1/24/2024 Expected End Date: 1/31/2025    Recent Progress: 60%    Note:     Barriers: language barrier, low literacy, noncompliance, and lack of knowledge how to navigate complex health care system  Strengths: motivated to attend appt  Patient expressed understanding of goal: Yes    Action steps to achieve this goal:  1. I will attend my rescheduled PFT and pulmonology appts on 4/09/2024. Completed.  2. I will attend my follow up pulmonology appt on 7/23/24. No showed rescheduled on 9/12/2024 at 2:15 PM for lab and with provider at 2:30 PM. Attended lab appt on 9/12, but not pulmonology appt.  3. I will attend my rescheduled pulmonology appointment on 9/26/24 at 2:00pm   3. I will let my [facility] worker/coordinator know once the appointment is scheduled.   4. I will update CCC team know at [facility].    Note: Per Pulmonary clinic's protocol that the clinic or care team will reach out to patient to schedule follow up appointment.                              Care Plan: ID clinic       Problem: ID clinic       Goal: Patient will attend ID clinic appointment as scheduled in the next 12 months.       Start Date: 3/28/2024 Expected End Date: 12/31/2024    This Visit's Progress: 50% Recent Progress: 50%    Note:     Barriers: language barrier, low literacy, noncompliance, and lack of  knowledge how to navigate complex health care system  Strengths: motivated to attend appt  Patient expressed understanding of goal: Yes    Action steps to achieve this goal:  1. I will attend my follow up appointment with infection doctor as scheduled on 7/11/2024 at 9:05 AM. Completed.  2. I will attend my follow up appointment as scheduled on 10/24/2024 at 9:45 AM.  3. I will update CCC team at outreach.                             Care Plan: GI       Problem: Chronic hepatitis C without hepatic coma       Goal: Patient will attend GI clinic appointment in the next 12 months.       Start Date: 3/28/2024 Expected End Date: 12/31/2024    Recent Progress: 40%    Note:     Barriers: language barrier, low literacy, noncompliance, and lack of knowledge how to navigate complex health care system  Strengths: motivated to attend appt  Patient expressed understanding of goal: Yes    Action steps to achieve this goal:  1. I will attend my GI appointment as scheduled on 5/1/2024 at 8:00am lab then 8:45 am with Dr Morales. Completed.   2. I will attend my follow up appointment on 9/04/2024 at 8:40 AM and will attend lab appointments prior. Ride arranged with [facility]. No showed.   3.  I will attend my follow up appointment on 1/8/25 at 9am for lab and 9:45am with Dr Morales.   4. I will schedule a follow up appointment with my provider if it is recommended to do so while I am at the clinic.  5. I will follow up with CCC regarding this goal at each outreach until it is completed.                           Plan/Recommendations: The patient will continue working with Care Coordination to achieve goal(s) as above. CHW will continue outreaches at minimum every 30 days and will involve Lead CC as needed or if patient is ready to move to Maintenance. Lead CC will continue to monitor CHW outreaches and patient's progress to goal(s) every 6 weeks.     Plan of Care updated and sent to patient: Marlys

## 2024-09-25 ENCOUNTER — PATIENT OUTREACH (OUTPATIENT)
Dept: CARE COORDINATION | Facility: CLINIC | Age: 29
End: 2024-09-25
Payer: COMMERCIAL

## 2024-09-25 NOTE — PROGRESS NOTES
Clinic Care Coordination Contact  Community Health Worker Follow Up    Care Gaps:   Health Maintenance Due   Topic Date Due    ADVANCE CARE PLANNING  Never done    COPD ACTION PLAN  Never done    COVID-19 Vaccine (2 - 2024-25 season) 09/01/2024     Care Gaps Last addressed on 9/17/2024.    Care Plan:   Care Plan: Cardiology       Problem: Pulmonary hypertension       Goal: Patient will attend his cardiology clinic appointment in the next 12 months.       Start Date: 1/24/2024 Expected End Date: 1/30/2025    This Visit's Progress: 70% Recent Progress: 60%    Note:     Barriers: language barrier, low literacy, noncompliance, and lack of knowledge how to navigate complex health care system  Strengths: motivated to attend appt  Patient expressed understanding of goal: Yes    Action steps to achieve this goal:  1. I will answer my phone when cardiology clinic call to schedule follow up appointment.   2. I will attend my ECHO appointment as scheduled 2/7/2024 at 9:00am. Completed.   3. I will attend my initial cardiology appointment on 4/1/2024 at 10:15am with Andreas Johns MD. Completed.  4. I will attend my appointment for ECHO as scheduled on 4/09/2024 at 11:00 AM. Completed.  5. I will attend my follow up cardiology appointment on 8/20/24 at 8:00am. Completed.  6. Patient will attend his follow up cardiology appointment on 10/21/2024 at 7:45am for lab and 8:45 AM with Dr Johns. Reminded and transportation arranged with [facility].  7. I will update CCC team at outreach.                             Care Plan: Pulmonology       Problem: Pulmonary hypertension       Goal: Patient will attend his pulmonology clinic appointment in the next 12 months.       Start Date: 1/24/2024 Expected End Date: 1/31/2025    This Visit's Progress: 60% Recent Progress: 60%    Note:     Barriers: language barrier, low literacy, noncompliance, and lack of knowledge how to navigate complex health care system  Strengths:  motivated to attend appt  Patient expressed understanding of goal: Yes    Action steps to achieve this goal:  1. I will attend my rescheduled PFT and pulmonology appts on 4/09/2024. Completed.  2. I will attend my follow up pulmonology appt on 7/23/24. No showed rescheduled on 9/12/2024 at 2:15 PM for lab and with provider at 2:30 PM. Attended lab appt on 9/12, but not pulmonology appt.  3. I will attend my rescheduled pulmonology appointment on 9/26/24 at 2:00pm. Reminded and transportation arranged with [facility].  3. I will let my [facility] worker/coordinator know once the appointment is scheduled.   4. I will update CCC team know at outreach.                            Care Plan: ID clinic       Problem: ID clinic       Goal: Patient will attend ID clinic appointment as scheduled in the next 12 months.       Start Date: 3/28/2024 Expected End Date: 12/31/2024    This Visit's Progress: 60% Recent Progress: 50%    Note:     Barriers: language barrier, low literacy, noncompliance, and lack of knowledge how to navigate complex health care system  Strengths: motivated to attend appt  Patient expressed understanding of goal: Yes    Action steps to achieve this goal:  1. I will attend my follow up appointment with infection doctor as scheduled on 7/11/2024 at 9:05 AM. Completed.  2. I will attend my follow up appointment as scheduled on 10/24/2024 at 9:45 AM. Reminded and transportation arranged with [facility].  3. I will update CCC team at outreach.                             Care Plan: GI       Problem: Chronic hepatitis C without hepatic coma       Goal: Patient will attend GI clinic appointment in the next 12 months.       Start Date: 3/28/2024 Expected End Date: 12/31/2024    This Visit's Progress: 50% Recent Progress: 40%    Note:     Barriers: language barrier, low literacy, noncompliance, and lack of knowledge how to navigate complex health care system  Strengths: motivated to attend appt  Patient expressed  understanding of goal: Yes    Action steps to achieve this goal:  1. I will attend my GI appointment as scheduled on 5/1/2024 at 8:00am lab then 8:45 am with Dr Morales. Completed.   2. I will attend my follow up appointment on 9/04/2024 at 8:40 AM and will attend lab appointments prior. Ride arranged with [facility]. No showed.   3.  I will attend my follow up appointment on 1/8/25 at 9am for lab and 9:45am with Dr Morales.   4. I will schedule a follow up appointment with my provider if it is recommended to do so while I am at the clinic.  5. I will follow up with CCC regarding this goal at each outreach until it is completed.                           Intervention and Education during outreach:  -CHW reminded patient of upcoming appointments and transportation arranged with [facility] as appropriated.   -CHW informed patient to bring all medication bottles for review at every visit.   -Per patient, he's receiving home visit for skills nursing and medications set up bi-weekly.   -Patient was informed to call with questions or concerns.     CHW Next Outreach: In one month.

## 2024-09-26 ENCOUNTER — HOSPITAL ENCOUNTER (OUTPATIENT)
Facility: CLINIC | Age: 29
End: 2024-09-26
Attending: INTERNAL MEDICINE | Admitting: INTERNAL MEDICINE
Payer: COMMERCIAL

## 2024-09-26 ENCOUNTER — OFFICE VISIT (OUTPATIENT)
Dept: CARDIOLOGY | Facility: CLINIC | Age: 29
End: 2024-09-26
Attending: PHYSICIAN ASSISTANT
Payer: COMMERCIAL

## 2024-09-26 VITALS
WEIGHT: 153.2 LBS | BODY MASS INDEX: 25.16 KG/M2 | SYSTOLIC BLOOD PRESSURE: 137 MMHG | DIASTOLIC BLOOD PRESSURE: 80 MMHG | HEART RATE: 104 BPM | OXYGEN SATURATION: 97 %

## 2024-09-26 DIAGNOSIS — R06.09 DOE (DYSPNEA ON EXERTION): ICD-10-CM

## 2024-09-26 DIAGNOSIS — I27.20 PULMONARY HYPERTENSION (H): ICD-10-CM

## 2024-09-26 DIAGNOSIS — R06.09 DOE (DYSPNEA ON EXERTION): Primary | ICD-10-CM

## 2024-09-26 PROCEDURE — G0463 HOSPITAL OUTPT CLINIC VISIT: HCPCS | Performed by: PHYSICIAN ASSISTANT

## 2024-09-26 RX ORDER — AMBRISENTAN 10 MG/1
10 TABLET, FILM COATED ORAL DAILY
Qty: 30 TABLET | Refills: 11 | COMMUNITY
Start: 2024-09-26 | End: 2024-10-03

## 2024-09-26 RX ORDER — LIDOCAINE 40 MG/G
CREAM TOPICAL
OUTPATIENT
Start: 2024-09-26

## 2024-09-26 RX ORDER — AMBRISENTAN 10 MG/1
10 TABLET, FILM COATED ORAL DAILY
Qty: 30 TABLET | Refills: 11 | OUTPATIENT
Start: 2024-09-26 | End: 2024-09-26 | Stop reason: DRUGHIGH

## 2024-09-26 ASSESSMENT — PAIN SCALES - GENERAL: PAINLEVEL: NO PAIN (0)

## 2024-09-26 NOTE — PROGRESS NOTES
Date of Visit:  09/26/24      [facility]pertension [facility]      Primary PH cardiologist:   Dr. Johns      HPI:  Mr. Carolina Torres is a pleasant 28 year old male who immigrated [place] from a [place] in [place].  He has a PMHx including HIV, hepatitis C, and drug abuse. He also has known pulmonary hypertension and was evaluated by Dr. Johns in April of this year. RHC showed severe pulmonary hypertension with significant RV dysfunction and PVR of 11 wood units. Parenteral prostacyclin was indicated but not felt to be a good option given his social circumstances and language barrier. Thus, plan was for triple up front therapy with sildenafil, ambrisentan, and inhaled Tyvaso. Additionally, he was placed on Lasix and digoxin.     Unfortunately, since that time we have had difficulty getting July in for follow up. Today, I am seeing him in clinic. He has started all of his medications as directed. He tells me that he is feeling much better in regard to his breathing. He denies any new LE edema. He has not had chest pain, palpitations, or dizziness/presyncope.     Labs performed prior to our visit today were reviewed as below.     Of note, today's visit was performed with the use of a telephone .       CURRENT PULMONARY HYPERTENSION REGIMEN:    PAH Rx: sildenafil 20mg TID,  ambrisentan 5mg daily, Tyvaso DPI 64mcg QID    Diuretics: Lasix 20mg daily     Oxygen: None    Anticoagulation: None      ASSESSMENT/PLAN:      1. Pulmonary hypertension:   --Mr. Torres has severe PAH with RV dysfunction, in the setting of HIV and Hepatitis C infections. He is now on triple combination therapy with sildenafil, ambrisentan, and inhaled Tyvaso 64mcg QID. As he is tolerating well, increase ambrisentan to full dose 10mg daily. Symptomatically, he is much improved and COMPERA risk score is intermediate low at 2.    --Continue digoxin for RV support.    --From a volume standpoint he  appears euvolemic on Lasix 20mg daily. Recent labs show normalization of his NT-proBNP and preserved renal function.    --He does not currently require supplemental oxygen, based on last 6MWT.   --Continue to follow with ID as recommended.   --I encouraged him to follow up with the GI team--his LFTs are elevated, but in the setting of ongoing ETOH abuse and hepatitis C with possible cirrhosis. He has some upcoming testing in this regard.     Follow up plan: Return in 3 months with a RHC, echocardiogram, and 6MWT to see Dr. Johns.   We are happy to see the patient back sooner with any new concerns.       Orders this Visit:  Orders Placed This Encounter   Procedures    Echocardiogram Complete    6 minute walk test    Case Request Cath Lab: Heart Cath Right Heart Cath     Orders Placed This Encounter   Medications    DISCONTD: ambrisentan (LETAIRIS) 10 MG tablet     Sig: Take 1 tablet (10 mg) by mouth daily.     Dispense:  30 tablet     Refill:  11    Treprostinil (TYVASO DPI) 64 MCG inhaler     Sig: Inhale 1 Inhalation (64 mcg) into the lungs 4 times daily.    ambrisentan (LETAIRIS) 10 MG tablet     Sig: Take 1 tablet (10 mg) by mouth daily. Please make sure you are completing you monthly mandatory      Dispense:  30 tablet     Refill:  11     If pt tolerated the 5 mg daily for 30 days then increase to 10 mg daily     Medications Discontinued During This Encounter   Medication Reason    ambrisentan (LETAIRIS) 5 MG tablet     Treprostinil (TYVASO DPI TITRATION KIT) 112 x 16MCG & 84 x 32MCG POWD Dose adjustment    ambrisentan (LETAIRIS) 10 MG tablet Dose adjustment       Review of Systems:  POS ROS ARE BOLDED, all other negative.    Cardiovascular: Chest pain, palpitations, orthopnea, LE edema  Resp: Dyspnea on exertion, cough, known chronic lung disease  Hematologic/lymphatic: Current systemic anticoagulation, hx of blood clots, new bleeding concerns.  Neurological: Dizziness, syncope/presyncope     Physical  Exam:  Vitals: /80 (BP Location: Right arm, Patient Position: Chair, Cuff Size: Adult Regular)   Pulse 104   Wt 69.5 kg (153 lb 3.2 oz)   SpO2 97%   BMI 25.16 kg/m     Wt Readings from Last 4 Encounters:   09/26/24 69.5 kg (153 lb 3.2 oz)   09/17/24 65.4 kg (144 lb 4 oz)   07/11/24 63.5 kg (140 lb 1.6 oz)   05/16/24 61.7 kg (136 lb 1.9 oz)       GEN:  In general, this is a well nourished male in no acute distress on RA.  Patient ambulatory, unaccompanied.   NECK: Supple, No JVD with patient upright.  C/V:  Regular rate and rhythm, no murmur, rub or gallop. Mildly accentuated P2. No S3 or RV heave.   RESP: Respirations are unlabored. No use of accessory muscles. Clear to auscultation bilaterally without wheezing, rales, or rhonchi.  EXTREM: No LE edema.   NEURO: Alert and oriented, cooperative. Gait not assessed.      Recent Lab Results:    LIVER ENZYME RESULTS:  Lab Results   Component Value Date     (H) 09/12/2024     (H) 09/12/2024       CBC RESULTS:  Lab Results   Component Value Date    WBC 6.2 09/12/2024    RBC 4.14 (L) 09/12/2024    HGB 13.9 09/12/2024    HCT 40.8 09/12/2024    MCV 99 09/12/2024    MCH 33.6 (H) 09/12/2024    MCHC 34.1 09/12/2024    RDW 12.8 09/12/2024     (L) 09/12/2024       BMP RESULTS:  Lab Results   Component Value Date     09/12/2024    POTASSIUM 3.5 09/12/2024    CHLORIDE 99 09/12/2024    CO2 25 09/12/2024    ANIONGAP 14 09/12/2024    GLC 90 09/12/2024    BUN 9.4 09/12/2024    CR 0.75 09/12/2024    GFRESTIMATED >90 09/12/2024    ASHLEY 9.8 09/12/2024        Recent Labs   Lab Test 09/12/24  1443 07/11/24  0946 05/01/24  0815 04/09/24  0814   NTBN  --   --   --  4,948*   NTBNP 132 331 2,146*  --          Most recent pertinent testing:      Echocardiogram 4/9/24  Interpretation Summary     Left ventricle is small and underfilled. Global and regional left ventricular  function is normal with an EF of 60-65%. Paradoxical septal motion consistent  with right  ventricular pressure and volume overload is present.  Severe right ventricular dilation is present. Global right ventricular  function is severely reduced.  The right ventricular systolic pressure is 60mmHg above the right atrial  pressure.  Mild aortic insufficiency is present.  Aorta is mildly dilated at the sinuses of valsalva measuring 4cm  Positive bubble study suggestive of PFO  IVC diameter >2.1 cm collapsing <50% with sniff suggests a high RA pressure  estimated at 15 mmHg or greater.  Trivial pericardial effusion is present.      RHC 4/9/24    Conclusion         Right sided filling pressures are moderately elevated.    Left sided filling pressures are normal.    Severely elevated pulmonary artery hypertension.    Reduced cardiac output level.    No response to acute vasodilator challenge with inhaled nitric oxide.         Hemodynamics    Right sided filling pressures are moderately elevated. Left sided filling pressures are normal. Severely elevated pulmonary artery hypertension. Reduced cardiac output level.     Pressures Phase: Baseline     Time Systolic (mmHg) Diastolic (mmHg) Mean (mmHg) A Wave (mmHg) V Wave (mmHg) EDP (mmHg) Max dp/dt (mmHg/sec) HR (bpm) Content (mL/dL) SAT (%)   RA Pressures  1:08 PM   15    15    18      81        RV Pressures 12:53 PM       768           1:09 PM 74    15         67        PA Pressures  1:09 PM 74    28    45        73          1:20 PM 75    32    46        60        PCW Pressures  1:10 PM   8    8    8      73          Pressures Phase: Nitric Oxide     Time Systolic (mmHg) Diastolic (mmHg) Mean (mmHg) A Wave (mmHg) V Wave (mmHg) EDP (mmHg) Max dp/dt (mmHg/sec) HR (bpm) Content (mL/dL) SAT (%)   RA Pressures  1:30 PM   15    20    19      68        PA Pressures  1:29 PM 66    36    47        64        PCW Pressures  1:29 PM   8    10    11      63              NYHA Functional Class:  2-3      A total of 30 minutes was spent today performing chart and history review,  gathering HPI, physical exam, patient education, pre and post visit documentation, and care coordination.      Sarah Jernigan PA-C  [facility]  Pager [phone]

## 2024-09-26 NOTE — PATIENT INSTRUCTIONS
You were seen today in the Pulmonary Hypertension Clinic at the [facility].     Cardiology Provider you saw during your visit:    JEANNE Catalan    Medication Changes:  Increase Letairis to 10mg once per day    Recommendations:   Come back in early January 2025 for a RHC with Dr. Johns, Echocardiogram, 6mwt & labs    Follow-up:   You will see Dr. Johns the same day as the testing    Please call us immediately if you have any syncope (fainting or passing out), chest pain, edema (swelling or weight gain), or decline in your functional status (general decline in how you are feeling).    If you have emergent concerns after hours or on the weekend, please call our on-call Cardiologist at [phone], option [phone]. For emergencies call [phone].     Thank you for allowing us to be a part of your care here at the [facility]    If you have questions or concerns please contact us at:    Yaritza Bull RN (P: [phone])    Nurse Coordinator       Pulmonary Hypertension     [facility]         AJ Godinez   (Prior Auths & Pt Assistance)   ()  Clinic   Clinic   Pulmonary Hypertension   Pulmonary Hypertension  [facility]  [facility]  (P)[phone]    (P) [phone]  (F) [phone]

## 2024-09-26 NOTE — NURSING NOTE
"Plan as patient understands:  Medication Changes:  Increase Letairis to 10mg once per day    Recommendations:   Come back in early January 2025 for a RHC with Dr. Johns, Echocardiogram, 6mwt & labs    Follow-up:   You will see Dr. Johns the same day as the testing    ========================  Reviewed Med list  Completed AVS  Follow-up orders placed  Marked chart \"Ready for Checkout\"  Patient brought bottles into clinic and I wrote on his Letairis that it would be increasing.  Originally sent Rx to [facility], but patient's bottles demonstrates it comes from [facility], so new Erx was sent.  Sent staff msg to Ivon for testing in early Jan. '25  Patient verbalized understanding, agreed with plan and denied any further questions. Yaritza Bull RN on 9/26/2024 at 2:49 PM    "

## 2024-09-26 NOTE — NURSING NOTE
Chief Complaint   Patient presents with    Follow Up     PH Appt Date- 9/26/24       Vitals were taken and medications reconciled.    Raj La, EMT  2:21 PM

## 2024-09-26 NOTE — LETTER
9/26/2024      RE: Carolina Torres  [address]  [place] [address]       Dear Colleague,    Thank you for the opportunity to participate in the care of your patient, Carolina Torres, at the [facility] [place] [facility]. Please see a copy of my visit note below.        Date of Visit:  09/26/24      [facility]pert[facility]      Primary PH cardiologist:   Dr. Johns      HPI:  Mr. Carolina Torres is a pleasant 28 year old male who immigrated [place] in [facility].  He has a PMHx including HIV, hepatitis C, and drug abuse. He also has known pulmonary hypertension and was evaluated by Dr. Johns in April of this year. RHC showed severe pulmonary hypertension with significant RV dysfunction and PVR of 11 wood units. Parenteral prostacyclin was indicated but not felt to be a good option given his social circumstances and language barrier. Thus, plan was for triple up front therapy with sildenafil, ambrisentan, and inhaled Tyvaso. Additionally, he was placed on Lasix and digoxin.     Unfortunately, since that time we have had difficulty getting Carolina matson for follow up. Today, I am seeing him in clinic. He has started all of his medications as directed. He tells me that he is feeling much better in regard to his breathing. He denies any new LE edema. He has not had chest pain, palpitations, or dizziness/presyncope.     Labs performed prior to our visit today were reviewed as below.     Of note, today's visit was performed with the use of a telephone .       CURRENT PULMONARY HYPERTENSION REGIMEN:    PAH Rx: sildenafil 20mg TID,  ambrisentan 5mg daily, Tyvaso DPI 64mcg QID    Diuretics: Lasix 20mg daily     Oxygen: None    Anticoagulation: None      ASSESSMENT/PLAN:      1. Pulmonary hypertension:   --Mr. Torres has severe PAH with RV dysfunction, in the setting of HIV and Hepatitis C infections.  He is now on triple combination therapy with sildenafil, ambrisentan, and inhaled Tyvaso 64mcg QID. As he is tolerating well, increase ambrisentan to full dose 10mg daily. Symptomatically, he is much improved and COMPERA risk score is intermediate low at 2.    --Continue digoxin for RV support.    --From a volume standpoint he appears euvolemic on Lasix 20mg daily. Recent labs show normalization of his NT-proBNP and preserved renal function.    --He does not currently require supplemental oxygen, based on last 6MWT.   --Continue to follow with ID as recommended.   --I encouraged him to follow up with the GI team--his LFTs are elevated, but in the setting of ongoing ETOH abuse and hepatitis C with possible cirrhosis. He has some upcoming testing in this regard.     Follow up plan: Return in 3 months with a RHC, echocardiogram, and 6MWT to see Dr. Johns.   We are happy to see the patient back sooner with any new concerns.       Orders this Visit:  Orders Placed This Encounter   Procedures     Echocardiogram Complete     6 minute walk test     Case Request Cath Lab: Heart Cath Right Heart Cath     Orders Placed This Encounter   Medications     DISCONTD: ambrisentan (LETAIRIS) 10 MG tablet     Sig: Take 1 tablet (10 mg) by mouth daily.     Dispense:  30 tablet     Refill:  11     Treprostinil (TYVASO DPI) 64 MCG inhaler     Sig: Inhale 1 Inhalation (64 mcg) into the lungs 4 times daily.     ambrisentan (LETAIRIS) 10 MG tablet     Sig: Take 1 tablet (10 mg) by mouth daily. Please make sure you are completing you monthly mandatory      Dispense:  30 tablet     Refill:  11     If pt tolerated the 5 mg daily for 30 days then increase to 10 mg daily     Medications Discontinued During This Encounter   Medication Reason     ambrisentan (LETAIRIS) 5 MG tablet      Treprostinil (TYVASO DPI TITRATION KIT) 112 x 16MCG & 84 x 32MCG POWD Dose adjustment     ambrisentan (LETAIRIS) 10 MG tablet Dose adjustment       Review of  Systems:  POS ROS ARE BOLDED, all other negative.    Cardiovascular: Chest pain, palpitations, orthopnea, LE edema  Resp: Dyspnea on exertion, cough, known chronic lung disease  Hematologic/lymphatic: Current systemic anticoagulation, hx of blood clots, new bleeding concerns.  Neurological: Dizziness, syncope/presyncope     Physical Exam:  Vitals: /80 (BP Location: Right arm, Patient Position: Chair, Cuff Size: Adult Regular)   Pulse 104   Wt 69.5 kg (153 lb 3.2 oz)   SpO2 97%   BMI 25.16 kg/m     Wt Readings from Last 4 Encounters:   09/26/24 69.5 kg (153 lb 3.2 oz)   09/17/24 65.4 kg (144 lb 4 oz)   07/11/24 63.5 kg (140 lb 1.6 oz)   05/16/24 61.7 kg (136 lb 1.9 oz)       GEN:  In general, this is a well nourished male in no acute distress on RA.  Patient ambulatory, unaccompanied.   NECK: Supple, No JVD with patient upright.  C/V:  Regular rate and rhythm, no murmur, rub or gallop. Mildly accentuated P2. No S3 or RV heave.   RESP: Respirations are unlabored. No use of accessory muscles. Clear to auscultation bilaterally without wheezing, rales, or rhonchi.  EXTREM: No LE edema.   NEURO: Alert and oriented, cooperative. Gait not assessed.      Recent Lab Results:    LIVER ENZYME RESULTS:  Lab Results   Component Value Date     (H) 09/12/2024     (H) 09/12/2024       CBC RESULTS:  Lab Results   Component Value Date    WBC 6.2 09/12/2024    RBC 4.14 (L) 09/12/2024    HGB 13.9 09/12/2024    HCT 40.8 09/12/2024    MCV 99 09/12/2024    MCH 33.6 (H) 09/12/2024    MCHC 34.1 09/12/2024    RDW 12.8 09/12/2024     (L) 09/12/2024       BMP RESULTS:  Lab Results   Component Value Date     09/12/2024    POTASSIUM 3.5 09/12/2024    CHLORIDE 99 09/12/2024    CO2 25 09/12/2024    ANIONGAP 14 09/12/2024    GLC 90 09/12/2024    BUN 9.4 09/12/2024    CR 0.75 09/12/2024    GFRESTIMATED >90 09/12/2024    ASHLEY 9.8 09/12/2024        Recent Labs   Lab Test 09/12/24  1443 07/11/24  0946 05/01/24  0815  04/09/24  0814   NTBNPI  --   --   --  4,948*   NTBNP 132 331 2,146*  --          Most recent pertinent testing:      Echocardiogram 4/9/24  Interpretation Summary     Left ventricle is small and underfilled. Global and regional left ventricular  function is normal with an EF of 60-65%. Paradoxical septal motion consistent  with right ventricular pressure and volume overload is present.  Severe right ventricular dilation is present. Global right ventricular  function is severely reduced.  The right ventricular systolic pressure is 60mmHg above the right atrial  pressure.  Mild aortic insufficiency is present.  Aorta is mildly dilated at the sinuses of valsalva measuring 4cm  Positive bubble study suggestive of PFO  IVC diameter >2.1 cm collapsing <50% with sniff suggests a high RA pressure  estimated at 15 mmHg or greater.  Trivial pericardial effusion is present.      RHC 4/9/24    Conclusion          Right sided filling pressures are moderately elevated.     Left sided filling pressures are normal.     Severely elevated pulmonary artery hypertension.     Reduced cardiac output level.     No response to acute vasodilator challenge with inhaled nitric oxide.         Hemodynamics    Right sided filling pressures are moderately elevated. Left sided filling pressures are normal. Severely elevated pulmonary artery hypertension. Reduced cardiac output level.     Pressures Phase: Baseline     Time Systolic (mmHg) Diastolic (mmHg) Mean (mmHg) A Wave (mmHg) V Wave (mmHg) EDP (mmHg) Max dp/dt (mmHg/sec) HR (bpm) Content (mL/dL) SAT (%)   RA Pressures  1:08 PM   15    15    18      81        RV Pressures 12:53 PM       768           1:09 PM 74    15         67        PA Pressures  1:09 PM 74    28    45        73          1:20 PM 75    32    46        60        PCW Pressures  1:10 PM   8    8    8      73          Pressures Phase: Nitric Oxide     Time Systolic (mmHg) Diastolic (mmHg) Mean (mmHg) A Wave (mmHg) V Wave (mmHg)  EDP (mmHg) Max dp/dt (mmHg/sec) HR (bpm) Content (mL/dL) SAT (%)   RA Pressures  1:30 PM   15    20    19      68        PA Pressures  1:29 PM 66    36    47        64        PCW Pressures  1:29 PM   8    10    11      63              NYHA Functional Class:  2-3      A total of 30 minutes was spent today performing chart and history review, gathering HPI, physical exam, patient education, pre and post visit documentation, and care coordination.      Sarah Jernigan PA-C  [facility]  Pager [phone]            Please do not hesitate to contact me if you have any questions/concerns.     Sincerely,     JEANNE Catalan

## 2024-10-02 ENCOUNTER — ANCILLARY PROCEDURE (OUTPATIENT)
Dept: ULTRASOUND IMAGING | Facility: CLINIC | Age: 29
End: 2024-10-02
Attending: STUDENT IN AN ORGANIZED HEALTH CARE EDUCATION/TRAINING PROGRAM
Payer: COMMERCIAL

## 2024-10-02 DIAGNOSIS — B18.2 CHRONIC HEPATITIS C WITHOUT HEPATIC COMA (H): ICD-10-CM

## 2024-10-02 PROCEDURE — 76981 USE PARENCHYMA: CPT | Performed by: RADIOLOGY

## 2024-10-03 ENCOUNTER — TELEPHONE (OUTPATIENT)
Dept: CARDIOLOGY | Facility: CLINIC | Age: 29
End: 2024-10-03
Payer: COMMERCIAL

## 2024-10-03 DIAGNOSIS — R06.09 DOE (DYSPNEA ON EXERTION): ICD-10-CM

## 2024-10-03 DIAGNOSIS — I27.20 PULMONARY HYPERTENSION (H): ICD-10-CM

## 2024-10-03 RX ORDER — AMBRISENTAN 10 MG/1
10 TABLET, FILM COATED ORAL DAILY
Qty: 30 TABLET | Refills: 11 | Status: SHIPPED | OUTPATIENT
Start: 2024-10-03

## 2024-10-03 NOTE — TELEPHONE ENCOUNTER
Called pharmacy and they are requesting a new Rx for the Ambrisentan 10mg.  Agreed to send. Yaritza Bull RN on 10/3/2024 at 12:09 PM

## 2024-10-03 NOTE — TELEPHONE ENCOUNTER
[facility] Call Center    Phone Message    May a detailed message be left on voicemail: yes     Reason for Call: Other: Josy from the [facility] in [place] was calling to see if this prescription can be sent over to them.     Action Taken: Other: Cardiology    Travel Screening: Not Applicable     Thank you!  [facility]

## 2024-10-10 DIAGNOSIS — Z53.9 DIAGNOSIS NOT YET DEFINED: Primary | ICD-10-CM

## 2024-10-10 PROCEDURE — G0179 MD RECERTIFICATION HHA PT: HCPCS | Performed by: FAMILY MEDICINE

## 2024-10-15 ENCOUNTER — DOCUMENTATION ONLY (OUTPATIENT)
Dept: CARDIOLOGY | Facility: CLINIC | Age: 29
End: 2024-10-15
Payer: COMMERCIAL

## 2024-10-15 DIAGNOSIS — I27.20 PULMONARY HYPERTENSION (H): Primary | ICD-10-CM

## 2024-10-15 DIAGNOSIS — R06.09 DOE (DYSPNEA ON EXERTION): ICD-10-CM

## 2024-10-23 ENCOUNTER — PATIENT OUTREACH (OUTPATIENT)
Dept: CARE COORDINATION | Facility: CLINIC | Age: 29
End: 2024-10-23
Payer: COMMERCIAL

## 2024-10-23 NOTE — PROGRESS NOTES
Clinic Care Coordination Contact  Community Health Worker Follow Up    Care Gaps:   Health Maintenance Due   Topic Date Due    ADVANCE CARE PLANNING  Never done    COPD ACTION PLAN  Never done    COVID-19 Vaccine (2 - 2024-25 season) 09/01/2024     Scheduled in December 2024 with PCP.      Care Plan:   Care Plan: Cardiology       Problem: Pulmonary hypertension       Goal: Patient will attend his cardiology clinic appointment in the next 12 months.       Start Date: 1/24/2024 Expected End Date: 1/30/2025    This Visit's Progress: 80% Recent Progress: 70%    Note:     Barriers: language barrier, low literacy, noncompliance, and lack of knowledge how to navigate complex health care system  Strengths: motivated to attend appt  Patient expressed understanding of goal: Yes    Action steps to achieve this goal:  1. I will answer my phone when cardiology clinic call to schedule follow up appointment.   2. I will attend my ECHO appointment as scheduled 2/7/2024 at 9:00am. Completed.   3. I will attend my initial cardiology appointment on 4/1/2024 at 10:15am with Andreas Johns MD. Completed.  4. I will attend my appointment for ECHO as scheduled on 4/09/2024 at 11:00 AM. Completed.  5. I will attend my follow up cardiology appointment on 8/20/24 at 8:00am. Completed.  6. Patient will attend his follow up cardiology appointment on 10/21/2024 at 7:45am for lab and 8:45 AM with Dr Johns. Cancelled and inappropriate visit per cancel reason noted. Patient is scheduled for procedure on 12/24/2024.  7. I will update CCC team at outreach.                             Care Plan: Pulmonology       Problem: Pulmonary hypertension       Goal: Patient will attend his pulmonology clinic appointment in the next 12 months.       Start Date: 1/24/2024 Expected End Date: 1/31/2025    This Visit's Progress: 70% Recent Progress: 60%    Note:     Barriers: language barrier, low literacy, noncompliance, and lack of knowledge how to  navigate complex health care system  Strengths: motivated to attend appt  Patient expressed understanding of goal: Yes    Action steps to achieve this goal:  1. I will attend my rescheduled PFT and pulmonology appts on 4/09/2024. Completed.  2. I will attend my follow up pulmonology appt on 7/23/24. No showed rescheduled on 9/12/2024 at 2:15 PM for lab and with provider at 2:30 PM. Attended lab appt on 9/12, but not pulmonology appt.  3. I will attend my rescheduled pulmonology appointment on 9/26/24 at 2:00pm. Attended and Recommendations:   Come back in early January 2025 for a RHC with Dr. Johns, Echocardiogram, 6mwt & labs     Follow-up:   You will see Dr. Johns the same day as the testing  3. I will let my Psychiatric hospital worker/coordinator know once the appointment is scheduled.   4. I will update CCC team know at outreach.                            Care Plan: ID clinic       Problem: ID clinic       Goal: Patient will attend ID clinic appointment as scheduled in the next 12 months.       Start Date: 3/28/2024 Expected End Date: 12/31/2024    This Visit's Progress: 70% Recent Progress: 60%    Note:     Barriers: language barrier, low literacy, noncompliance, and lack of knowledge how to navigate complex health care system  Strengths: motivated to attend appt  Patient expressed understanding of goal: Yes    Action steps to achieve this goal:  1. I will attend my follow up appointment with infection doctor as scheduled on 7/11/2024 at 9:05 AM. Completed.  2. I will attend my follow up appointment as scheduled on 10/24/2024 at 9:45 AM. Reminded and transportation arranged with [facility].  3. I will update CCC team at outreach.                             Care Plan: GI       Problem: Chronic hepatitis C without hepatic coma       Goal: Patient will attend GI clinic appointment in the next 12 months.       Start Date: 3/28/2024 Expected End Date: 12/31/2024    This Visit's Progress: 60% Recent Progress: 50%     Note:     Barriers: language barrier, low literacy, noncompliance, and lack of knowledge how to navigate complex health care system  Strengths: motivated to attend appt  Patient expressed understanding of goal: Yes    Action steps to achieve this goal:  1. I will attend my GI appointment as scheduled on 5/1/2024 at 8:00am lab then 8:45 am with Dr Morales. Completed.   2. I will attend my follow up appointment on 9/04/2024 at 8:40 AM and will attend lab appointments prior. Ride arranged with [facility]. No showed.   3.  I will attend my follow up appointment on 1/8/25 at 9am for lab and 9:45am with Dr Morales. Updated patient.   4. I will schedule a follow up appointment with my provider if it is recommended to do so while I am at the clinic.  5. I will follow up with CCC regarding this goal at each outreach until it is completed.                           Intervention and Education during outreach:  -CHW reminded patient of upcoming appointment and transportation arranged as appropriated.  -Per chart review that attended pulmonary follow up appointment on 9/26/2024 and recommended below.   Recommendations:   Come back in early January 2025 for a RHC with Dr. Johns, Echocardiogram, 6mwt & labs     Follow-up:   You will see Dr. Johns the same day as the testing    CHW Next Outreach: In one month.

## 2024-10-24 ENCOUNTER — OFFICE VISIT (OUTPATIENT)
Dept: INFECTIOUS DISEASES | Facility: CLINIC | Age: 29
End: 2024-10-24
Payer: COMMERCIAL

## 2024-10-24 ENCOUNTER — LAB (OUTPATIENT)
Dept: LAB | Facility: CLINIC | Age: 29
End: 2024-10-24
Payer: COMMERCIAL

## 2024-10-24 VITALS
HEART RATE: 76 BPM | TEMPERATURE: 99.1 F | DIASTOLIC BLOOD PRESSURE: 60 MMHG | BODY MASS INDEX: 25.62 KG/M2 | WEIGHT: 156 LBS | SYSTOLIC BLOOD PRESSURE: 118 MMHG

## 2024-10-24 DIAGNOSIS — R06.09 DOE (DYSPNEA ON EXERTION): ICD-10-CM

## 2024-10-24 DIAGNOSIS — I27.20 PULMONARY HYPERTENSION (H): ICD-10-CM

## 2024-10-24 DIAGNOSIS — B20 HUMAN IMMUNODEFICIENCY VIRUS (HIV) DISEASE (H): Primary | ICD-10-CM

## 2024-10-24 DIAGNOSIS — B20 HUMAN IMMUNODEFICIENCY VIRUS (HIV) DISEASE (H): ICD-10-CM

## 2024-10-24 LAB
ALBUMIN SERPL BCG-MCNC: 4.2 G/DL (ref 3.5–5.2)
ALP SERPL-CCNC: 152 U/L (ref 40–150)
ALT SERPL W P-5'-P-CCNC: 300 U/L (ref 0–70)
ANION GAP SERPL CALCULATED.3IONS-SCNC: 12 MMOL/L (ref 7–15)
AST SERPL W P-5'-P-CCNC: 334 U/L (ref 0–45)
BASOPHILS # BLD AUTO: 0 10E3/UL (ref 0–0.2)
BASOPHILS NFR BLD AUTO: 1 %
BILIRUB SERPL-MCNC: 0.9 MG/DL
BUN SERPL-MCNC: 9.1 MG/DL (ref 6–20)
CALCIUM SERPL-MCNC: 9.9 MG/DL (ref 8.8–10.4)
CD3 CELLS # BLD: 1273 CELLS/UL (ref 603–2990)
CD3 CELLS NFR BLD: 77 % (ref 49–84)
CD3+CD4+ CELLS # BLD: 491 CELLS/UL (ref 441–2156)
CD3+CD4+ CELLS NFR BLD: 30 % (ref 28–63)
CD3+CD4+ CELLS/CD3+CD8+ CLL BLD: 0.67 % (ref 1.4–2.6)
CD3+CD8+ CELLS # BLD: 735 CELLS/UL (ref 125–1312)
CD3+CD8+ CELLS NFR BLD: 44 % (ref 10–40)
CHLORIDE SERPL-SCNC: 103 MMOL/L (ref 98–107)
CREAT SERPL-MCNC: 0.84 MG/DL (ref 0.67–1.17)
EGFRCR SERPLBLD CKD-EPI 2021: >90 ML/MIN/1.73M2
EOSINOPHIL # BLD AUTO: 0.3 10E3/UL (ref 0–0.7)
EOSINOPHIL NFR BLD AUTO: 6 %
ERYTHROCYTE [DISTWIDTH] IN BLOOD BY AUTOMATED COUNT: 13.5 % (ref 10–15)
GLUCOSE SERPL-MCNC: 99 MG/DL (ref 70–99)
HCO3 SERPL-SCNC: 24 MMOL/L (ref 22–29)
HCT VFR BLD AUTO: 38.7 % (ref 40–53)
HGB BLD-MCNC: 13 G/DL (ref 13.3–17.7)
IMM GRANULOCYTES # BLD: 0 10E3/UL
IMM GRANULOCYTES NFR BLD: 0 %
LYMPHOCYTES # BLD AUTO: 1.4 10E3/UL (ref 0.8–5.3)
LYMPHOCYTES NFR BLD AUTO: 33 %
MCH RBC QN AUTO: 33.2 PG (ref 26.5–33)
MCHC RBC AUTO-ENTMCNC: 33.6 G/DL (ref 31.5–36.5)
MCV RBC AUTO: 99 FL (ref 78–100)
MONOCYTES # BLD AUTO: 0.6 10E3/UL (ref 0–1.3)
MONOCYTES NFR BLD AUTO: 14 %
NEUTROPHILS # BLD AUTO: 2 10E3/UL (ref 1.6–8.3)
NEUTROPHILS NFR BLD AUTO: 46 %
NT-PROBNP SERPL-MCNC: 187 PG/ML (ref 0–450)
PLATELET # BLD AUTO: 181 10E3/UL (ref 150–450)
POTASSIUM SERPL-SCNC: 4.2 MMOL/L (ref 3.4–5.3)
PROT SERPL-MCNC: 8.6 G/DL (ref 6.4–8.3)
RBC # BLD AUTO: 3.92 10E6/UL (ref 4.4–5.9)
SODIUM SERPL-SCNC: 139 MMOL/L (ref 135–145)
T CELL COMMENT: ABNORMAL
WBC # BLD AUTO: 4.4 10E3/UL (ref 4–11)

## 2024-10-24 PROCEDURE — 36415 COLL VENOUS BLD VENIPUNCTURE: CPT

## 2024-10-24 PROCEDURE — 80053 COMPREHEN METABOLIC PANEL: CPT

## 2024-10-24 PROCEDURE — 85025 COMPLETE CBC W/AUTO DIFF WBC: CPT

## 2024-10-24 PROCEDURE — 86360 T CELL ABSOLUTE COUNT/RATIO: CPT

## 2024-10-24 PROCEDURE — 99213 OFFICE O/P EST LOW 20 MIN: CPT | Performed by: INTERNAL MEDICINE

## 2024-10-24 PROCEDURE — 87536 HIV-1 QUANT&REVRSE TRNSCRPJ: CPT

## 2024-10-24 PROCEDURE — 83880 ASSAY OF NATRIURETIC PEPTIDE: CPT

## 2024-10-24 PROCEDURE — G2211 COMPLEX E/M VISIT ADD ON: HCPCS | Performed by: INTERNAL MEDICINE

## 2024-10-24 PROCEDURE — 86359 T CELLS TOTAL COUNT: CPT

## 2024-10-24 NOTE — PROGRESS NOTES
[facility] INFECTIOUS DISEASE CLINIC [facility]    Date: 10/24/2024   Patient Name: Carolina Torres   YOB: 1995  MRN: [id number]      ASSESSMENT:  29 year old man followed for HIV management.    HIV infection.  Diagnosed in [facility] ~2023.  Outside records show a CD4 count of 788 in July 2023.  Duration of infection unclear.  Initially on combination pill of dolutegravir, lamivudine, tenofovir DF.  Viral load undetectable and CD4 count 589 at initial visit. No pretreatment genotype available.  Patient reports acquisition from sharing needles.  Unknown history of opportunistic infections.  Wife and 1 daughter are also HIV positive and on treatment. Currently on Biktarvy, missing 1-2 doses a week. Last viral load 223.  TB screening completed. Negative quantiferon test. AFB sputum x 3 negative Sept 2023  Hepatitis C.  Presumed from IV drug use.  Genotype 3A.  Viral load 8,590,000.  Has referral placed to GI for May.  Hepatitis B surface antigen negative with immunity from previous infection (positive core and surface antibodies).  Also has documentation of vaccination on 4/5/2013, 6/6/2013, and 7/17/2023 hepatitis A vaccine x 1 on 2/1/2024. Followed by GI, has not started treatment yet  Pulmonary hypertension.  Diagnosed prior to arrival.  Outpatient chest x-ray with cardiomegaly.  Seeing cardiology  Chronic tobacco use. Currently vaping only   EtOH use. Regular use, 1-2 drinks per week  History of polysubstance abuse, including IV drug use.  Reports sobriety    PLAN:  -continue biktarvy 1 pill daily. Discussed importance to regular adherence. Will place MTM referral  -Reviewed upcoming appts. Missed last appt with GI, rescheduled for January. We discussed importance of making this appt.  -Discussed importance of EtOH cessation  -cmp, cbc, viral load, CD4 count today    Return to clinic in 3 months.    Augustine Tello MD  [facility]   Clinic phone: [phone]   Clinic fax: [phone]      ______________________________________________________________________    HISTORY OF PRESENT ILLNESS:   From initial visit on 2/29/24:    Carolina Torres is a 28 year old man who is referred for evaluation of HIV.  Patient recently immigrated from [facility] in January of this year.  He was recently evaluated in primary care clinic and referred to ID due to a diagnosis of HIV.  He has extensive immigration records that are scanned and were reviewed today.  The patient has a known history of polysubstance abuse, including IV drug use.  He believes this is how he was infected with HIV.  Timeline of infection is unclear.  He denies any significant opportunistic infections in the past.  He does tell me that he was diagnosed over the past year and started on treatment.  He is on triple drug therapy.  He takes this regularly, but does miss occasional doses.  He is currently living with his wife and 3 children.  2 of his children are school-age and 1 is 16 months old.  His wife and middle daughter are both diagnosed with HIV and currently on treatment.  Today he has no acute complaints.  He does not have any questions regarding HIV diagnosis.  He knows that he needs to stay on his medications.  He does have a lesion on his nose that has been present for about a week.  He says that this started out as a pimple and is now draining.  He is not taking any medicine for this.    Outside records were reviewed which show workup for tuberculosis with sputum AFB smear and culture x 3 in September 2023.  Smear and cultures were negative.  He has a recent chest x-ray which indicates cardiomegaly without any other report of cavitary lesions or infiltrates.  He is known to be positive for hepatitis C.  HIV test was positive on 7/17/2023 (unknown if there were prior positive test).  Additional labs in July showed negative hepatitis B surface antigen, negative treponemal antibody.  An ultrasound of the liver showed enlarged liver smooth  surface with normal parenchymal echogenicity without gross focal lesion.  Enlarged intrahepatic IVC and hepatic vein.  No intrahepatic bile duct dilation is observed.  The visualized proximal CBD is measured about 0.3 cm.  Gallbladder is well-distended without gross stone.  Diffuse gallbladder wall thickening is noted.  The visualized part of pancreas appeared normal.  Spleen is measured about 11 cm in length.  Both kidneys are of normal size and echotexture.  Small ascites is noted.    Additional labs done following moved to [place] include negative QuantiFERON test, negative treponemal antibody, negative hepatitis B surface antigen, positive hepatitis B core and surface antibody, positive hepatitis C antibody with elevated viral load, genotype 3A.  Normal kidney and liver testing (mildly elevated AST)      Interval History:  Here for follow-up. Feels well, no new complaints. Says that his last appt with cardiology was cancelled. He missed an appt with GI, which is now scheduled for January. Says he takes his biktarvy, but sometimes misses a dose if he is out of his house and didn't bring his meds.    Seen with ipad     Review of Systems:  No fevers or chills      Past Medical History:  Past Medical History:   Diagnosis Date    Alcohol dependence (H)     Cannabis abuse     Current smoker     Hepatitis C     Human immunodeficiency virus (HIV) disease (H) 2019    upon arrival is on Dolutegravir/Lamivudine/Tenovir 50/300/300  one daily; CD4 788 (7/23)    Opioid use disorder, moderate, in sustained remission (H)     diagnosis [place] 1/[place]24    Other ascites     Pulmonary hypertension (H)     severely impaired right ventricular systolic function    Reaction to QuantiFERON-TB test (QFT) without active tuberculosis     class B1 upon [place] to [place]    Tattoos     right hand, left leg       Past Surgical History:  Past Surgical History:   Procedure Laterality Date    CV RIGHT HEART CATH  MEASUREMENTS RECORDED N/A 4/9/2024    Procedure: Heart Cath Right Heart Cath;  Surgeon: Andreas Johns MD;  Location:  HEART CARDIAC CATH LAB    CV RIGHT HEART CATH PULMONARY VASODILATOR STUDY N/A 4/9/2024    Procedure: Right Heart Cath Pulmonary Vasodilator Study;  Surgeon: Andreas Johns MD;  Location:  HEART CARDIAC CATH LAB       Allergies:  No Known Allergies    Medications:    Current Outpatient Medications:     ambrisentan (LETAIRIS) 10 MG tablet, Take 1 tablet (10 mg) by mouth daily., Disp: 30 tablet, Rfl: 11    bictegravir-emtricitabine-tenofovir (BIKTARVY) -25 MG per tablet, Take 1 tablet by mouth daily, Disp: 30 tablet, Rfl: 11    digoxin (LANOXIN) 250 MCG tablet, Take 1 tablet (250 mcg) by mouth daily, Disp: 30 tablet, Rfl: 11    furosemide (LASIX) 20 MG tablet, Take 1 tablet (20 mg) by mouth daily, Disp: 90 tablet, Rfl: 3    sildenafil (REVATIO) 20 MG tablet, Take 1 tablet (20 mg) by mouth 3 times daily, Disp: 90 tablet, Rfl: 11    Treprostinil (TYVASO DPI) 64 MCG inhaler, Inhale 1 Inhalation (64 mcg) into the lungs 4 times daily., Disp: , Rfl:     acamprosate (CAMPRAL) 333 MG EC tablet, Take 2 tablets (666 mg) by mouth 3 times daily. (Patient not taking: Reported on 10/24/2024), Disp: 180 tablet, Rfl: 3    Social History:  Social History     Socioeconomic History    Marital status:      Spouse name: Not on file    Number of children: Not on file    Years of education: Not on file    Highest education level: Not on file   Occupational History    Not on file   Tobacco Use    Smoking status: Every Day     Types: Cigarettes     Passive exposure: Current    Smokeless tobacco: Current     Types: Chew    Tobacco comments:     Betel nut with tobacco    Vaping Use    Vaping Use: Never used   Substance and Sexual Activity    Alcohol use: Not on file    Drug use: Not on file    Sexual activity: Not on file   Other Topics Concern    Not on file   Social History Narrative    As of  2024        [place] in [place]: 2024        Marrital status:  to  Henrik Griffin ( 1997) as of         Living situation: lives in an apartment with spouse and 3 kids            Languages spoken: [place]            Past employment: farmer            Place of birth: [place]    Years in a refugee [place]: 27 (all life until [place] to [place]        Family not in [place]:     Family in [place]:         Education previous [place] the [place]: [place] 2    Education started in the [place]:none            Pentecostalism: not asked        A#: [phone]         Social Determinants of Health     Financial Resource Strain: Low Risk  (2024)    Financial Resource Strain     Within the past 12 months, have you or your family members you live with been unable to get utilities (heat, electricity) when it was really needed?: No   Food Insecurity: Low Risk  (2024)    Food Insecurity     Within the past 12 months, did you worry that your food would run out before you got money to buy more?: No     Within the past 12 months, did the food you bought just not last and you didn t have money to get more?: No   Transportation Needs: Low Risk  (2024)    Transportation Needs     Within the past 12 months, has lack of transportation kept you from medical appointments, getting your medicines, non-medical meetings or appointments, work, or from getting things that you need?: No   Physical Activity: Not on file   Stress: Not on file   Social Connections: Not on file   Interpersonal Safety: Low Risk  (2024)    Interpersonal Safety     Do you feel physically and emotionally safe where you currently live?: Yes     Within the past 12 months, have you been hit, slapped, kicked or otherwise physically hurt by someone?: No     Within the past 12 months, have you been humiliated or emotionally abused in other ways by your partner or ex-partner?: No   Housing Stability: Low Risk  (2024)    Housing Stability     Do you  have housing? : Yes     Are you worried about losing your housing?: No        PHYSICAL EXAM:    /60 (BP Location: Right arm, Patient Position: Sitting, Cuff Size: Adult Regular)   Pulse 76   Temp 99.1  F (37.3  C) (Oral)   Wt 70.8 kg (156 lb)   BMI 25.62 kg/m      GENERAL:  well-developed, well-nourished, in no acute distress.   HENT:  Head is normocephalic, atraumatic. Oropharynx is moist without exudates or ulcers.  No thrush.  Terrible dentition.  EYES:  Eyes have anicteric sclerae without conjunctival injection or stigmata of endocarditis.    NECK:  Supple. Small, nontender lymph nodes   LUNGS:  Clear to auscultation.  CARDIOVASCULAR:  Regular rate and rhythm with no murmurs, gallops or rubs.  ABDOMEN:  Normal bowel sounds, soft, nontender.    MUSCULOSKELETAL: Extremities warm and without edema. No joint swelling.  SKIN:  No acute rashes.   NEUROLOGIC: Grossly nonfocal. Normal gait and station.          Pertinent labs:    Lab Results   Component Value Date     09/12/2024    POTASSIUM 3.5 09/12/2024    CHLORIDE 99 09/12/2024    CO2 25 09/12/2024    BUN 9.4 09/12/2024    CR 0.75 09/12/2024    GLC 90 09/12/2024       Lab Results   Component Value Date    WBC 6.2 09/12/2024    HGB 13.9 09/12/2024    HCT 40.8 09/12/2024     (L) 09/12/2024    MCV 99 09/12/2024    RDW 12.8 09/12/2024        Lab Results   Component Value Date    BILITOTAL 0.9 09/12/2024     (H) 09/12/2024     (H) 09/12/2024    PROTTOTAL 9.3 (H) 09/12/2024    ALBUMIN 4.4 09/12/2024    ALKPHOS 188 (H) 09/12/2024    INR 0.92 09/12/2024            MICROBIOLOGY DATA:  None    RADIOLOGY:  Reviewed      Attestation:  Total time preparing to see this patient, face-to-face time, and coordinating care time on the same calendar date: 21 minutes.  Face-face time: 14 minutes.  Over 50% of face-to-face time was spent in counseling/coordination of care.     The longitudinal plan of care for the diagnosis(es)/condition(s) as  documented were addressed during this visit. Due to the added complexity in care, I will continue to support July in the subsequent management and with ongoing continuity of care.

## 2024-10-25 ENCOUNTER — TELEPHONE (OUTPATIENT)
Dept: CARDIOLOGY | Facility: CLINIC | Age: 29
End: 2024-10-25
Payer: COMMERCIAL

## 2024-10-25 NOTE — TELEPHONE ENCOUNTER
10/25 talked w/ pt and  and Deloris, Deloris did not have any openings in Dec and would like to wait for Jan schedule. Will cb once we hav Jan schedule for 6 mwt, echo, and RHC w/ [facility]

## 2024-10-26 LAB
HIV1 RNA # PLAS NAA DL=20: <20 COPIES/ML
HIV1 RNA SERPL NAA+PROBE-LOG#: <1.3 {LOG_COPIES}/ML

## 2024-10-28 ENCOUNTER — TELEPHONE (OUTPATIENT)
Dept: PHARMACY | Facility: OTHER | Age: 29
End: 2024-10-28
Payer: COMMERCIAL

## 2024-10-28 NOTE — TELEPHONE ENCOUNTER
MTM referral from: [facility] visit (referral by provider)    MTM referral outreach attempt #2 on October 28, 2024 at 3:21 PM      Outcome: Patient not reachable after several attempts, routed to Pharmacist Team/Provider as an FYI    Use lissett carrasco for the carrier/Plan on the flowsheet      MTM Practitioner please send patient letter    See Guerrero  [facility]   [phone]

## 2024-11-01 DIAGNOSIS — B18.2 CHRONIC HEPATITIS C WITHOUT HEPATIC COMA (H): Primary | ICD-10-CM

## 2024-11-04 ENCOUNTER — TELEPHONE (OUTPATIENT)
Dept: GASTROENTEROLOGY | Facility: CLINIC | Age: 29
End: 2024-11-04
Payer: COMMERCIAL

## 2024-11-04 NOTE — TELEPHONE ENCOUNTER
PA Initiation    Medication: MAVYRET 100-40 MG PO TABS  Insurance Company: [employer] - Phone [phone] Fax [phone]  Pharmacy Filling the Rx: [facility] - [place], [place] - [facility] [address]  Filling Pharmacy Phone: [phone]  Filling Pharmacy Fax: [phone]  Start Date: 11/4/2024

## 2024-11-05 NOTE — TELEPHONE ENCOUNTER
Hi Dr. Morales,   There is a drug interaction between Mavyret and digoxin where the serum digoxin concentration can be increased. The recommendation is to measure the digoxin serum concentrations before and after starting the Mavyret or reduce the digoxin dose by 15-30%. Can you let me know how you plan to address this DDI?    Thanks,  Marissa Bautista, PharmD  [facility]  [facility]  [phone]

## 2024-11-06 ENCOUNTER — PATIENT OUTREACH (OUTPATIENT)
Dept: CARE COORDINATION | Facility: CLINIC | Age: 29
End: 2024-11-06
Payer: COMMERCIAL

## 2024-11-06 NOTE — PROGRESS NOTES
Clinic Care Coordination Contact  Care Coordination Clinician Chart Review    Situation: Patient chart reviewed by Care Coordinator.       Background: Care Coordination Program started: 1/24/2024. Initial assessment completed and patient-centered care plan(s) were developed with participation from patient. Lead CC handed patient off to CHW for continued outreaches.       Assessment: Per chart review, patient outreach completed by CC CHW on 10/23/24.  Patient is actively working to accomplish goal(s). Patient's goal(s) appropriate and relevant at this time. Patient is not due for updated Plan of Care.  Assessments will be completed annually or as needed/with change of patient status.    Goals reviewed and updated today. CCRN plans to follow up again prior to heart cath procedure.      Care Plan: Cardiology       Problem: Pulmonary hypertension       Goal: Patient will attend his cardiology clinic appointment in the next 12 months.       Start Date: 1/24/2024 Expected End Date: 1/30/2025    This Visit's Progress: 80% Recent Progress: 70%    Note:     Barriers: language barrier, low literacy, noncompliance, and lack of knowledge how to navigate complex health care system  Strengths: motivated to attend appt  Patient expressed understanding of goal: Yes    Action steps to achieve this goal:  1. I will answer my phone when cardiology clinic call to schedule follow up appointment.   2. I will attend my ECHO appointment as scheduled 2/7/2024 at 9:00am. Completed.   3. I will attend my initial cardiology appointment on 4/1/2024 at 10:15am with Andreas Johns MD. Completed.  4. I will attend my appointment for ECHO as scheduled on 4/09/2024 at 11:00 AM. Completed.  5. I will attend my follow up cardiology appointment on 8/20/24 at 8:00am. Completed.  6. Patient will attend his follow up cardiology appointment on 10/21/2024 at 7:45am for lab and 8:45 AM with Dr Johns. Cancelled and inappropriate visit per cancel  reason noted. Patient is scheduled for procedure on 12/24/2024.  7. I will update CCC team at [facility].                             Care Plan: Pulmonology       Problem: Pulmonary hypertension       Goal: Patient will attend his pulmonology clinic appointment in the next 12 months.       Start Date: 1/24/2024 Expected End Date: 1/31/2025    This Visit's Progress: 70% Recent Progress: 60%    Note:     Barriers: language barrier, low literacy, noncompliance, and lack of knowledge how to navigate complex health care system  Strengths: motivated to attend appt  Patient expressed understanding of goal: Yes    Action steps to achieve this goal:  1. I will attend my rescheduled PFT and pulmonology appts on 4/09/2024. Completed.  2. I will attend my follow up pulmonology appt on 7/23/24. No showed rescheduled on 9/12/2024 at 2:15 PM for lab and with provider at 2:30 PM. Attended lab appt on 9/12, but not pulmonology appt.  3. I will attend my rescheduled pulmonology appointment on 9/26/24 at 2:00pm. Attended and Recommendations:   Come back in early January 2025 for a RHC with Dr. Johns, Echocardiogram, 6mwt & labs     Follow-up:   You will see Dr. Johns the same day as the testing  3. I will let my [facility] worker/coordinator know once the appointment is scheduled.   4. I will update CCC team know at [facility].                            Care Plan: ID clinic       Problem: ID clinic       Goal: Patient will attend ID clinic appointment as scheduled in the next 12 months.       Start Date: 3/28/2024 Expected End Date: 3/31/2025    Recent Progress: 70%    Note:     Barriers: language barrier, low literacy, noncompliance, and lack of knowledge how to navigate complex health care system  Strengths: motivated to attend appt  Patient expressed understanding of goal: Yes    Action steps to achieve this goal:  1. I will attend my follow up appointment with infection doctor as scheduled on 7/11/2024 at 9:05 AM. Completed.  2. I  will attend my follow up appointment as scheduled on 10/24/2024 at 9:45 AM. Completed  3. I will attend my 3 months ID clinic follow up on 1/30/25 at 0:25 am with Dr Tello.  4. I will update CCC team at outreach.                             Care Plan: GI       Problem: Chronic hepatitis C without hepatic coma       Goal: Patient will attend GI clinic appointment in the next 12 months.       Start Date: 3/28/2024 Expected End Date: 12/31/2024    This Visit's Progress: 60% Recent Progress: 50%    Note:     Barriers: language barrier, low literacy, noncompliance, and lack of knowledge how to navigate complex health care system  Strengths: motivated to attend appt  Patient expressed understanding of goal: Yes    Action steps to achieve this goal:  1. I will attend my GI appointment as scheduled on 5/1/2024 at 8:00am lab then 8:45 am with Dr Morales. Completed.   2. I will attend my follow up appointment on 9/04/2024 at 8:40 AM and will attend lab appointments prior. Ride arranged with [facility]. No showed.   3.  I will attend my follow up appointment on 1/8/25 at 9am for lab and 9:45am with Dr Morales. Updated patient.   4. I will schedule a follow up appointment with my provider if it is recommended to do so while I am at the clinic.  5. I will follow up with CCC regarding this goal at each outreach until it is completed.                                  Plan/Recommendations: The patient will continue working with Care Coordination to achieve goal(s) as above. CHW will continue outreaches at minimum every 30 days and will involve Lead CC as needed or if patient is ready to move to Maintenance. Lead CC will continue to monitor CHW outreaches and patient's progress to goal(s) every 6 weeks.     Plan of Care updated and sent to patient: No

## 2024-11-06 NOTE — Clinical Note
FYI: I moved your outreach on 11/23 to 12/16. I plan to follow up patient again before your outreach. Thanks.

## 2024-11-07 NOTE — TELEPHONE ENCOUNTER
Hi Dr. Johns,     We planned on initiating this patient on HepC treatment but the above drug drug interaction was noted. How do you feel about reduction in dosing of Digoxin ( by 15-30%) vs closely monitoring digoxin drug serum concentrations?

## 2024-11-12 ENCOUNTER — PATIENT OUTREACH (OUTPATIENT)
Dept: NURSING | Facility: CLINIC | Age: 29
End: 2024-11-12
Payer: COMMERCIAL

## 2024-11-12 NOTE — PROGRESS NOTES
Clinic Care Coordination Contact    Direct care provided in primary language, no  needed.     Follow Up Progress Note      Assessment: follow up on hep c treatment started on mavyret. Patient states he hasn't started on the treatment or prescription hasn't delivery yet. [facility] home care nurse sets up his meds every 2 weeks. Message sent to [facility] specialty pharmacy.    Care Plans  Care Plan: Cardiology       Problem: Pulmonary hypertension       Goal: Patient will attend his cardiology clinic appointment in the next 12 months.       Start Date: 1/24/2024 Expected End Date: 1/30/2025    This Visit's Progress: 80% Recent Progress: 70%    Note:     Barriers: language barrier, low literacy, noncompliance, and lack of knowledge how to navigate complex health care system  Strengths: motivated to attend appt  Patient expressed understanding of goal: Yes    Action steps to achieve this goal:  1. I will answer my phone when cardiology clinic call to schedule follow up appointment.   2. I will attend my ECHO appointment as scheduled 2/7/2024 at 9:00am. Completed.   3. I will attend my initial cardiology appointment on 4/1/2024 at 10:15am with Andreas Johns MD. Completed.  4. I will attend my appointment for ECHO as scheduled on 4/09/2024 at 11:00 AM. Completed.  5. I will attend my follow up cardiology appointment on 8/20/24 at 8:00am. Completed.  6. Patient will attend his follow up cardiology appointment on 10/21/2024 at 7:45am for lab and 8:45 AM with Dr Johns. Cancelled and inappropriate visit per cancel reason noted. Patient is scheduled for procedure on 12/24/2024.  7. I will update CCC team at outreach.                             Care Plan: Pulmonology       Problem: Pulmonary hypertension       Goal: Patient will attend his pulmonology clinic appointment in the next 12 months.       Start Date: 1/24/2024 Expected End Date: 1/31/2025    This Visit's Progress: 70% Recent Progress: 60%     Note:     Barriers: language barrier, low literacy, noncompliance, and lack of knowledge how to navigate complex health care system  Strengths: motivated to attend appt  Patient expressed understanding of goal: Yes    Action steps to achieve this goal:  1. I will attend my rescheduled PFT and pulmonology appts on 4/09/2024. Completed.  2. I will attend my follow up pulmonology appt on 7/23/24. No showed rescheduled on 9/12/2024 at 2:15 PM for lab and with provider at 2:30 PM. Attended lab appt on 9/12, but not pulmonology appt.  3. I will attend my rescheduled pulmonology appointment on 9/26/24 at 2:00pm. Attended and Recommendations:   Come back in early January 2025 for a C with Dr. Johns, Echocardiogram, 6mwt & labs     Follow-up:   You will see Dr. Johns the same day as the testing  3. I will let my [facility] worker/coordinator know once the appointment is scheduled.   4. I will update CCC team know at outreach.                            Care Plan: ID clinic       Problem: ID clinic       Goal: Patient will attend ID clinic appointment as scheduled in the next 12 months.       Start Date: 3/28/2024 Expected End Date: 3/31/2025    Recent Progress: 70%    Note:     Barriers: language barrier, low literacy, noncompliance, and lack of knowledge how to navigate complex health care system  Strengths: motivated to attend appt  Patient expressed understanding of goal: Yes    Action steps to achieve this goal:  1. I will attend my follow up appointment with infection doctor as scheduled on 7/11/2024 at 9:05 AM. Completed.  2. I will attend my follow up appointment as scheduled on 10/24/2024 at 9:45 AM. Completed  3. I will attend my 3 months ID clinic follow up on 1/30/25 at 0:25 am with Dr Tello.  4. I will update CCC team at outreach.                             Care Plan: GI       Problem: Chronic hepatitis C without hepatic coma       Goal: Patient will attend GI clinic appointment in the next 12 months.        Start Date: 3/28/2024 Expected End Date: 12/31/2024    This Visit's Progress: 60% Recent Progress: 50%    Note:     Barriers: language barrier, low literacy, noncompliance, and lack of knowledge how to navigate complex health care system  Strengths: motivated to attend appt  Patient expressed understanding of goal: Yes    Action steps to achieve this goal:  1. I will attend my GI appointment as scheduled on 5/1/2024 at 8:00am lab then 8:45 am with Dr Morales. Completed.   2. I will attend my follow up appointment on 9/04/2024 at 8:40 AM and will attend lab appointments prior. Ride arranged with [facility]. No showed.   3.  I will attend my follow up appointment on 1/8/25 at 9am for lab and 9:45am with Dr Morales. Updated patient.   4. I will schedule a follow up appointment with my provider if it is recommended to do so while I am at the clinic.  5. I will follow up with CCC regarding this goal at each outreach until it is completed.                             Plan: Patient will answer the phone when contacted by pharmacy for med delivery.

## 2024-11-12 NOTE — TELEPHONE ENCOUNTER
Prior Authorization Approval    Medication: MAVYRET 100-40 MG PO TABS  Authorization Effective Date: 11/5/2024  Authorization Expiration Date: 12/30/2024  Approved Dose/Quantity: 8 weeks  Reference #: [id number]   Insurance Company: [employer] - Phone [phone] Fax [phone]  Expected CoPay: $ 0  CoPay Card Available:    No  Financial Assistance Needed: No  Which Pharmacy is filling the prescription: [facility] - [place], [place] - [facility] [address]  Pharmacy Notified: Yes  Patient Notified: Yes     Del 11/14AM

## 2024-11-14 ENCOUNTER — CARE COORDINATION (OUTPATIENT)
Dept: GASTROENTEROLOGY | Facility: CLINIC | Age: 29
End: 2024-11-14
Payer: COMMERCIAL

## 2024-11-14 DIAGNOSIS — B18.2 CHRONIC HEPATITIS C WITHOUT HEPATIC COMA (H): Primary | ICD-10-CM

## 2024-11-14 NOTE — PROGRESS NOTES
"Connected with patient for f/u on Hep C treatment delivery/start status. Patient received their Mavyret medication and are ready to start treatment. Patient will be on Hep C treatment for 8 weeks. Patient reports no recent changes in health, hospitalizations or recent changes in medications. Patient did discuss with a pharmacist. Reviewed the following Hep C POC and education with patient    Counseled pt to stop taking Digoxin 250mcg while taking Mavyret. Advised pt that Digoxin is white, round, scored tablet that has a \"D\" and \"250\" on it. Pt verbalized understanding, removed from his medications.       Hepatitis C Treatment  Treatment: Mavyret x 8 weeks  Genotype: 3  Stage Fibrosis: F4  Previous Treatment Outcome: Naive    Please have labs drawn as close to the date indicated at an [facility].    Start Date: 11/14/24    Week 8-End of Treatment: 1/9/2025    3 Months Post Treatment  HCV RNA Quant Lab due: 4/9/2025        Please have this lab drawn on the specified date of 4/9/25 or within a week after. This final lab will determine if you have cleared the Hepatitis C virus with Mavyret treatment. Without this final lab, we will be unable to determine if treatment was successful. You do not need to fast for this lab.             Educational information to patient on Hep C treatment:     -Contact the [facility] Hepatology clinic and speak with clinical RN prior to starting any new prescribed or OTC medications.   -Take medications exactly as prescribed, do not change dose or stop taking without consulting your provider.   -Take Medication one time each day with food  -If you miss a dose of medication, then take it as soon as you remember on the same day. If not remembered on the same day, then skip the dose and take the next dose at the usual time. Do not take more than the recommended dose. Contact the clinic if you miss a dose.    Please contact the pharmacy 1-2 weeks prior to needing a medication refill.   "    Side Effects  The most common side effects of Hep C medication treatment can include:  -tiredness  -headache  -nausea  Notify the clinic of any side effects that bother you or that do not go away.    Contact clinic for rash, itching or unmanageable nausea.     How to store Hep C Treatment Medications  -Store Medication at room temperature below 86 degrees F  -Keep Medication in it's original container  -Do not use Medication if the seal is broken or missing     General information  It is not known if treatment will prevent you from infecting another person or reinfecting yourself with the hepatitis C virus during treatment. It is best that as soon as you start treatment to buy a new toothbrush, disposable razors (if you use a rotating shaver you do not need to buy a new one) and nail clippers. If you wear dentures, you should soak your dentures in 70-90% Isopropyl solution for 5 minutes one time within the first week of starting treatment. After dentures are done soaking, rinse your dentures off thoroughly with water. If you check your blood sugar at home, please dispose of the fingerstick needle after each use and DO NOT REUSE the insulin needles. These items should not be shared with anyone.          If you have any questions, please contact the main clinic at [phone] or your clinical RN at [phone]. We appreciate you choosing the [facility] for your treatment. Patient agrees to Hep C treatment POC and verbalizes understanding. Patient will receive a copy of treatment plan in the mail, address verified with patient. Patient has no further questions or concerns. Hep C care team updated on patient status.      Latesha Key RN Care Coordinator  [facility]  Hepatology Clinic/Specialty Program

## 2024-11-26 ENCOUNTER — TELEPHONE (OUTPATIENT)
Dept: GASTROENTEROLOGY | Facility: CLINIC | Age: 29
End: 2024-11-26
Payer: COMMERCIAL

## 2024-11-26 NOTE — TELEPHONE ENCOUNTER
Patient confirmed scheduled appointment:     Date: 1/6/25  Time: 9:00 am  Appointment Type: Return Liver  Provider: Dr. Nanette Morales  Location: [place]  Testing/imaging: Labs  Additional Notes:

## 2024-12-03 ENCOUNTER — APPOINTMENT (OUTPATIENT)
Dept: INTERPRETER SERVICES | Facility: CLINIC | Age: 29
End: 2024-12-03
Payer: COMMERCIAL

## 2024-12-03 ENCOUNTER — TELEPHONE (OUTPATIENT)
Dept: GASTROENTEROLOGY | Facility: CLINIC | Age: 29
End: 2024-12-03
Payer: COMMERCIAL

## 2024-12-03 NOTE — TELEPHONE ENCOUNTER
+ interp, LVM for pt to reschedule the 1.6.25 appt at 9am to be at 2pm, first attempt AN 12.3.24 (& LVM on the [id number] number)

## 2024-12-05 ENCOUNTER — TELEPHONE (OUTPATIENT)
Dept: GASTROENTEROLOGY | Facility: CLINIC | Age: 29
End: 2024-12-05
Payer: COMMERCIAL

## 2024-12-11 ENCOUNTER — TELEPHONE (OUTPATIENT)
Dept: CARDIOLOGY | Facility: CLINIC | Age: 29
End: 2024-12-11
Payer: COMMERCIAL

## 2024-12-11 NOTE — TELEPHONE ENCOUNTER
Called and [facility] worker [phone] to help schedule a ride for appts on 2/5.    Phylicia Alvarado

## 2024-12-11 NOTE — TELEPHONE ENCOUNTER
Cath Lab Case Request/Order    Location: [address] [address] [facility] Waiting Room    Procedure: Right Heart Cath (RHC)    Procedure Date: 2/5/25    Patient Arrival Time: 7:00am LABS [facility] 8:30am    Procedure Time: 3rd case to follow    Ordering Provider: Dr. Andreas Johns    Performing Cardiologist: Dr. Andreas Johns    Inpatient Bed Needed: No    Post-  Procedure GONZALO appointment scheduled (1 - 2 weeks): No appointment needed      Communicated Patient Instructions:     NPO, nothing to eat 8 hours and drink 2 hours before arrival time: No     , need to arrange a ride home - unable to drive post- procedure: No     Adult at home, need a responsible adult to stay with patient 24 hours post- procedure: No    Appointment was scheduled: Over the phone    Patient expressed understanding of above instructions and denied further questions at this time.    Phylicia Rodriges

## 2024-12-12 ENCOUNTER — PATIENT OUTREACH (OUTPATIENT)
Dept: NURSING | Facility: CLINIC | Age: 29
End: 2024-12-12
Payer: COMMERCIAL

## 2024-12-12 NOTE — PROGRESS NOTES
Clinic Care Coordination Contact  Care Coordination Clinician Chart Review    Situation: Patient chart reviewed by Care Coordinator.       Background: Care Coordination Program started: 1/24/2024. Initial assessment completed and patient-centered care plan(s) were developed with participation from patient. Lead CC handed patient off to CHW for continued outreaches.       Assessment: Per chart review, patient outreach completed by CC CHW on 10/23/24.  Patient is actively working to accomplish goal(s). Patient's goal(s) appropriate and relevant at this time. Patient is not due for updated Plan of Care.  Assessments will be completed annually or as needed/with change of patient status.    Cardiology   Per chart review, patient is scheduled for heart cath procedure on 2/5/25. Arrival time is 7:00am. CHW to request medical transportation. Goal updated.     ID clinic  Per chart review, ID clinic on 1/30/25 was rescheduled on 2/6/25 due to provider out. Goal updated.     GI follow up  Per chart review, patient is scheduled to follow up with GI clinic on 1/6/25. Goal up to date.       Care Plan: Cardiology Completed 1/6/2025      Problem: Pulmonary hypertension  Resolved 1/6/2025      Goal: Patient will attend his cardiology clinic appointment in the next 12 months.  Completed 1/6/2025      Start Date: 1/24/2024 Expected End Date: 1/30/2025    This Visit's Progress: 100% Recent Progress: 80%    Note:     Barriers: language barrier, low literacy, noncompliance, and lack of knowledge how to navigate complex health care system  Strengths: motivated to attend appt  Patient expressed understanding of goal: Yes    Action steps to achieve this goal:  1. I will answer my phone when cardiology clinic call to schedule follow up appointment.   2. I will attend my ECHO appointment as scheduled 2/7/2024 at 9:00am. Completed.   3. I will attend my initial cardiology appointment on 4/1/2024 at 10:15am with Andreas Johns MD.  Completed.  4. I will attend my appointment for ECHO as scheduled on 4/09/2024 at 11:00 AM. Completed.  5. I will attend my follow up cardiology appointment on 8/20/24 at 8:00am. Completed.  6. Patient will attend his follow up cardiology appointment on 10/21/2024 at 7:45am for lab and 8:45 AM with Dr Johns. Cancelled and inappropriate visit per cancel reason noted. Patient is rescheduled for procedure on 2/05/2025.  7. I will update CCC team at [facility].                             Care Plan: Pulmonology       Problem: Pulmonary hypertension       Goal: Patient will attend his pulmonology clinic appointment in the next 12 months.       Start Date: 1/24/2024 Expected End Date: 1/31/2025    This Visit's Progress: 70% Recent Progress: 70%    Note:     Barriers: language barrier, low literacy, noncompliance, and lack of knowledge how to navigate complex health care system  Strengths: motivated to attend appt  Patient expressed understanding of goal: Yes    Action steps to achieve this goal:  1. I will attend my rescheduled PFT and pulmonology appts on 4/09/2024. Completed.  2. I will attend my follow up pulmonology appt on 7/23/24. No showed rescheduled on 9/12/2024 at 2:15 PM for lab and with provider at 2:30 PM. Attended lab appt on 9/12, but not pulmonology appt.  3. I will attend my rescheduled pulmonology appointment on 9/26/24 at 2:00pm. Attended and Recommendations:   Come back in early January 2025 for a RHC with Dr. Johns, Echocardiogram, 6mwt & labs     Follow-up:   You will see Dr. Johns the same day as the testing  3. I will let my [facility] worker/coordinator know once the appointment is scheduled.   4. I will update CCC team know at [facility].                            Care Plan: ID clinic       Problem: ID clinic       Goal: Patient will attend ID clinic appointment as scheduled in the next 12 months.       Start Date: 3/28/2024 Expected End Date: 3/31/2025    This Visit's Progress: 80% Recent  Progress: 70%    Note:     Barriers: language barrier, low literacy, noncompliance, and lack of knowledge how to navigate complex health care system  Strengths: motivated to attend appt  Patient expressed understanding of goal: Yes    Action steps to achieve this goal:  1. I will attend my follow up appointment with infection doctor as scheduled on 7/11/2024 at 9:05 AM. Completed.  2. I will attend my follow up appointment as scheduled on 10/24/2024 at 9:45 AM. Completed  3. I will attend my 3 months ID clinic follow up on 1/30/25 at 0:25 am with Dr Tello. Rescheduled due to provider out.   4. I will attend my 3 rescheduled 3 months follow up ID clinic appointment on 2/6/25 at 9:05 am with Augustine Ross.  4. I will update CCC team at outreach.                             Care Plan: GI       Problem: Chronic hepatitis C without hepatic coma       Goal: Patient will attend GI clinic appointment in the next 12 months.       Start Date: 3/28/2024 Expected End Date: 3/31/2025    This Visit's Progress: 80% Recent Progress: 70%    Note:     Barriers: language barrier, low literacy, noncompliance, and lack of knowledge how to navigate complex health care system  Strengths: motivated to attend appt  Patient expressed understanding of goal: Yes    Action steps to achieve this goal:  1. I will attend my GI appointment as scheduled on 5/1/2024 at 8:00am lab then 8:45 am with Dr Morales. Completed.   2. I will attend my follow up appointment on 9/04/2024 at 8:40 AM and will attend lab appointments prior. Ride arranged with [employer]. No showed.   3.  I will attend my follow up appointment on 1/8/25 at 9am for lab and 9:45am with Dr Morales. Cancelled and rescheduled on 1/06/2025 with both lab and provider. Transportation is arranged with [employer].  4. I will schedule a follow up appointment with my provider if it is recommended to do so while I am at the clinic.  5. I will follow up with CCC regarding this goal at  each outreach until it is completed.                             Care Plan: Cardiology       Problem: HP GENERAL PROBLEM       Goal: Patient will attend his follow up cardiology clinic appointment in the next 12 months.       Start Date: 1/6/2025 Expected End Date: 12/31/2025    This Visit's Progress: 10%    Note:     Barriers: language barrier, low literacy, noncompliance, and lack of knowledge how to navigate complex health care system  Strengths: motivated to attend appt  Patient expressed understanding of goal: Yes    Action steps to achieve this goal:  1. I will attend my RHC procedure on 2/5/25 at 7:00 am arrival time.   2. I will schedule a follow up appointment with my cardiologist if it is recommended to do so while I am at the clinic.  3. I will follow up with CCC regarding this goal at each outreach until it is completed.                                  Plan/Recommendations: The patient will continue working with Care Coordination to achieve goal(s) as above. CHW will continue outreaches at minimum every 30 days and will involve Lead CC as needed or if patient is ready to move to Maintenance. Lead CC will continue to monitor CHW outreaches and patient's progress to goal(s) every 6 weeks.     Plan of Care updated and sent to patient: No

## 2024-12-12 NOTE — TELEPHONE ENCOUNTER
Called [facility] listed number and she stated that she no longer helps set up rides for him, will call other number.     Ivon Guo  Clinic    Pulmonary Hypertension   [facility]  (P) [phone]

## 2024-12-16 ENCOUNTER — PATIENT OUTREACH (OUTPATIENT)
Dept: CARE COORDINATION | Facility: CLINIC | Age: 29
End: 2024-12-16
Payer: COMMERCIAL

## 2024-12-16 NOTE — PROGRESS NOTES
Clinic Care Coordination Contact  Community Health Worker Follow Up    Care Gaps:   Health Maintenance Due   Topic Date Due    ADVANCE CARE PLANNING  Never done    COPD ACTION PLAN  Never done    COVID-19 Vaccine (2 - Pfizer risk series) 02/14/2024     Care Gaps Last addressed on 11/12/2024    Care Plan:   Care Plan: Cardiology       Problem: Pulmonary hypertension       Goal: Patient will attend his cardiology clinic appointment in the next 12 months.       Start Date: 1/24/2024 Expected End Date: 1/30/2025    This Visit's Progress: 80% Recent Progress: 80%    Note:     Barriers: language barrier, low literacy, noncompliance, and lack of knowledge how to navigate complex health care system  Strengths: motivated to attend appt  Patient expressed understanding of goal: Yes    Action steps to achieve this goal:  1. I will answer my phone when cardiology clinic call to schedule follow up appointment.   2. I will attend my ECHO appointment as scheduled 2/7/2024 at 9:00am. Completed.   3. I will attend my initial cardiology appointment on 4/1/2024 at 10:15am with Andreas Johns MD. Completed.  4. I will attend my appointment for ECHO as scheduled on 4/09/2024 at 11:00 AM. Completed.  5. I will attend my follow up cardiology appointment on 8/20/24 at 8:00am. Completed.  6. Patient will attend his follow up cardiology appointment on 10/21/2024 at 7:45am for lab and 8:45 AM with Dr Johns. Cancelled and inappropriate visit per cancel reason noted. Patient is rescheduled for procedure on 2/05/2025.  7. I will update CCC team at outreach.                             Care Plan: Pulmonology       Problem: Pulmonary hypertension       Goal: Patient will attend his pulmonology clinic appointment in the next 12 months.       Start Date: 1/24/2024 Expected End Date: 1/31/2025    This Visit's Progress: 70% Recent Progress: 70%    Note:     Barriers: language barrier, low literacy, noncompliance, and lack of knowledge how  to navigate complex health care system  Strengths: motivated to attend appt  Patient expressed understanding of goal: Yes    Action steps to achieve this goal:  1. I will attend my rescheduled PFT and pulmonology appts on 4/09/2024. Completed.  2. I will attend my follow up pulmonology appt on 7/23/24. No showed rescheduled on 9/12/2024 at 2:15 PM for lab and with provider at 2:30 PM. Attended lab appt on 9/12, but not pulmonology appt.  3. I will attend my rescheduled pulmonology appointment on 9/26/24 at 2:00pm. Attended and Recommendations:   Come back in early January 2025 for a RHC with Dr. Johns, Echocardiogram, 6mwt & labs     Follow-up:   You will see Dr. Johns the same day as the testing  3. I will let my [facility] worker/coordinator know once the appointment is scheduled.   4. I will update CCC team know at outreach.                            Care Plan: ID clinic       Problem: ID clinic       Goal: Patient will attend ID clinic appointment as scheduled in the next 12 months.       Start Date: 3/28/2024 Expected End Date: 3/31/2025    This Visit's Progress: 70% Recent Progress: 70%    Note:     Barriers: language barrier, low literacy, noncompliance, and lack of knowledge how to navigate complex health care system  Strengths: motivated to attend appt  Patient expressed understanding of goal: Yes    Action steps to achieve this goal:  1. I will attend my follow up appointment with infection doctor as scheduled on 7/11/2024 at 9:05 AM. Completed.  2. I will attend my follow up appointment as scheduled on 10/24/2024 at 9:45 AM. Completed  3. I will attend my 3 months ID clinic follow up on 1/30/25 at 0:25 am with Dr Tello. Updated.  4. I will update CCC team at outreach.                             Care Plan: GI       Problem: Chronic hepatitis C without hepatic coma       Goal: Patient will attend GI clinic appointment in the next 12 months.       Start Date: 3/28/2024 Expected End Date: 12/31/2024     This Visit's Progress: 70% Recent Progress: 60%    Note:     Barriers: language barrier, low literacy, noncompliance, and lack of knowledge how to navigate complex health care system  Strengths: motivated to attend appt  Patient expressed understanding of goal: Yes    Action steps to achieve this goal:  1. I will attend my GI appointment as scheduled on 5/1/2024 at 8:00am lab then 8:45 am with Dr Morales. Completed.   2. I will attend my follow up appointment on 9/04/2024 at 8:40 AM and will attend lab appointments prior. Ride arranged with [employer]. No showed.   3.  I will attend my follow up appointment on 1/8/25 at 9am for lab and 9:45am with Dr Morales. Cancelled and rescheduled on 1/06/2025 with both lab and provider. Transportation is arranged with [employer].  4. I will schedule a follow up appointment with my provider if it is recommended to do so while I am at the clinic.  5. I will follow up with CCC regarding this goal at each outreach until it is completed.                           Intervention and Education during outreach:   -CHW reminded patient of upcoming appointment with PCP and MTM. Reminded patient to bring all medication bottles for review.   -Patient was informed to call with questions or concerns.     CHW Next Outreach: In one month.

## 2024-12-17 ENCOUNTER — OFFICE VISIT (OUTPATIENT)
Dept: FAMILY MEDICINE | Facility: CLINIC | Age: 29
End: 2024-12-17
Payer: COMMERCIAL

## 2024-12-17 ENCOUNTER — OFFICE VISIT (OUTPATIENT)
Dept: PHARMACY | Facility: CLINIC | Age: 29
End: 2024-12-17
Payer: COMMERCIAL

## 2024-12-17 VITALS
HEART RATE: 114 BPM | DIASTOLIC BLOOD PRESSURE: 62 MMHG | BODY MASS INDEX: 26.79 KG/M2 | RESPIRATION RATE: 16 BRPM | TEMPERATURE: 98.8 F | WEIGHT: 160.8 LBS | SYSTOLIC BLOOD PRESSURE: 128 MMHG | OXYGEN SATURATION: 98 % | HEIGHT: 65 IN

## 2024-12-17 VITALS
BODY MASS INDEX: 26.59 KG/M2 | HEART RATE: 129 BPM | SYSTOLIC BLOOD PRESSURE: 128 MMHG | OXYGEN SATURATION: 97 % | WEIGHT: 161.9 LBS | DIASTOLIC BLOOD PRESSURE: 75 MMHG

## 2024-12-17 DIAGNOSIS — B18.2 CHRONIC HEPATITIS C WITHOUT HEPATIC COMA (H): ICD-10-CM

## 2024-12-17 DIAGNOSIS — B20 HUMAN IMMUNODEFICIENCY VIRUS (HIV) DISEASE (H): ICD-10-CM

## 2024-12-17 DIAGNOSIS — I27.20 PULMONARY HYPERTENSION (H): ICD-10-CM

## 2024-12-17 DIAGNOSIS — B20 HUMAN IMMUNODEFICIENCY VIRUS (HIV) DISEASE (H): Primary | ICD-10-CM

## 2024-12-17 DIAGNOSIS — Z86.59 HISTORY OF DEPRESSION: ICD-10-CM

## 2024-12-17 DIAGNOSIS — F10.29 ALCOHOL DEPENDENCE WITH UNSPECIFIED ALCOHOL-INDUCED DISORDER (H): Primary | ICD-10-CM

## 2024-12-17 PROBLEM — J43.8 OTHER EMPHYSEMA (H): Status: RESOLVED | Noted: 2024-01-30 | Resolved: 2024-12-17

## 2024-12-17 PROCEDURE — 99605 MTMS BY PHARM NP 15 MIN: CPT | Performed by: PHARMACIST

## 2024-12-17 PROCEDURE — 99214 OFFICE O/P EST MOD 30 MIN: CPT | Performed by: FAMILY MEDICINE

## 2024-12-17 PROCEDURE — G2211 COMPLEX E/M VISIT ADD ON: HCPCS | Performed by: FAMILY MEDICINE

## 2024-12-17 NOTE — PATIENT INSTRUCTIONS
"  Recommendations from today's MTM visit:                                                       Take medications as prescribed, even if that means taking them a few hours later than normal   Could take medications with you if you are leaving home     Follow-up: Return in 7 weeks (on 2/5/2025) for With cardiology.      It was great speaking with you today.  I value your experience and would be very thankful for your time in providing feedback in our clinic survey. In the next few days, you may receive an email or text message from [employer] with a link to a survey related to your  clinical pharmacist.\"      To schedule another MTM appointment, please call the clinic directly or you may call the MTM scheduling line at [phone].      My Clinical Pharmacist's contact information:                                                       Please feel free to contact me with any questions or concerns you have.      Mackenzie Laboy, PharmD   Medication Therapy Management Pharmacy Resident      "

## 2024-12-17 NOTE — PROGRESS NOTES
Medication Therapy Management (MTM) Encounter    ASSESSMENT:                            Medication Adherence/Access: See below for considerations.    HIV  Managed by ID.Continue current medications with no change.     Pulmonary Hypertension  Managed by cardiology. Continue current medications with no change.     Hep C  Managed by hepatology. Continue current medications with no change.     PLAN:                            Take medications as prescribed, even if that means taking them a few hours later than normal   Could take medications with you if you are leaving home     Follow-up: Follow up with PCP. Will route note to ID MTM pharmacist to assess if further MTM visits are necessary.     SUBJECTIVE/OBJECTIVE:                          July Brian is a 29 year old male seen for an initial visit. He was referred to me from Dr. Tello.  (ID# [id number]) was used during today's visit. Following the MTM visit, patient was seen by PCP Dr. Braden.      Reason for visit: MTM initial visit.    Allergies/ADRs: None  Past Medical History: Reviewed in chart  Tobacco: He reports that he has been smoking cigarettes and vaping device. He has been exposed to tobacco smoke. He uses smokeless tobacco.  Nicotine/Tobacco Cessation Plan: Information offered: Patient not interested at this time    Alcohol: 1-3 beverages / week     Medication Adherence/Access: Has nurse that comes every 2 weeks to set up pill box. Pill box is setup up to take medications 3 times per day. His treprostinil is 4 times daily and he uses this 4 times daily. Mavyret is not put into pillbox, he takes those out of the  packaging once daily. Patient reported that   Ambrisentan, biktarvy, sildenafil, furosemide, and digoxin all had several open bottles of medication brought into clinic.     Per patient, he misses a dose of medication 1-2 times per week. He will not take medications late if he realizes that he has missed a dose. Reports that he is  most likely to miss his 5 pm dose as he is often not home at that time and he does not have a car to get home & he never carries his medications with him.       HIV:  Biktarvy 1 tablet daily  Diagnosis: human immunodeficiency virus  Patient reports no current medication side effects.     Pulmonary hypertension:  Ambrisentan 10 mg once daily   Digoxin 250 mcg once daily - not currently taking as he was told to stop with mavyret  Treprostinil 64 mcg inhaler 4 times daily   Sildenafil 20 mg 3 times daily   Furosemide 20 mg once daily   Patient reports no current medication side effects      Hep C:   Mavyret 3 tablets daily  Patient reports no current medication side effects      Today's Vitals: /75   Pulse (!) 129   Wt 161 lb 14.4 oz (73.4 kg)   SpO2 97%   BMI 26.59 kg/m    ----------------      I spent 30 minutes with this patient today. All changes were made via collaborative practice agreement with Rosalie Adams MD.     A summary of these recommendations was given to the patient (see AVS from today's appointment with Dr. Braden).    Mackenzie Laboy, PharmD   Medication Therapy Management Pharmacy Resident     Medication Therapy Recommendations  No medication therapy recommendations to display

## 2024-12-17 NOTE — Clinical Note
Bhupendra Braden,   Routing to you per [facility] protocol. Let me know if you have any questions!   Thanks,  Mackenzie

## 2024-12-17 NOTE — PROGRESS NOTES
"  Assessment & Plan     Alcohol dependence with unspecified alcohol-induced disorder (H)  Continues to drink, does express he wants to cut back and eventually quit. Prescribed acamprosate at last visit, but he never took. He would be interested in a support group or program. Will refer for substance use eval, and ask care coordination to follow up.  Due to his multiple chronic health conditions, I would also support his application for disability benefits - will ask care coordination to make sure he is submitting necessary paperwork.  - Adult Mental Health  Referral; Future    History of depression  Denies current depressive symptoms.    Human immunodeficiency virus (HIV) disease (H)  Taking biktarvy, last saw ID in October and plan is to follow up 3mos. Was supposed to be connected to ID MTM but saw our MTM pharmacist today - they have connected back to him.    Pulmonary hypertension (H)  Symptoms are well controlled on current meds. He has follow up in January.    Chronic hepatitis C without hepatic coma (H)  GI follow up was also rescheduled. On Mavyret, continue.          BMI  Estimated body mass index is 26.63 kg/m  as calculated from the following:    Height as of this encounter: 1.655 m (5' 5.16\").    Weight as of this encounter: 72.9 kg (160 lb 12.8 oz).   Weight management plan: Discussed healthy diet and exercise guidelines      Follow up for annual preventive. Declines COVID vaccine today.      30 minutes spent by me on the date of the encounter doing chart review, history and exam, documentation and further activities per the note    The longitudinal plan of care for the diagnosis(es)/condition(s) as documented were addressed during this visit. Due to the added complexity in care, I will continue to support July in the subsequent management and with ongoing continuity of care.    Subjective   July is a 29 year old, presenting for the following health issues:  Follow up  (Alcohol. )        " "12/17/2024     2:39 PM   Additional Questions   Roomed by frandy medina   Accompanied by Self     History of Present Illness       Reason for visit:  Follow up Alcohol.    He eats 0-1 servings of fruits and vegetables daily.He consumes 0 sweetened beverage(s) daily.He exercises with enough effort to increase his heart rate 9 or less minutes per day.  He exercises with enough effort to increase his heart rate 3 or less days per week.   He is taking medications regularly.     Would like a support letter to apply for other benefits to not work - feels like he needs help with food, rent, etc, any kind of benefit  Not able to work due to his health conditions - even when he applies to different companies to work, he is never hired  Gets some chest pain after he coughs, otherwise no chest pain; when he uses inhaler, he coughs, then gets pain in chest  No shortness of breath  Thinks it was his PCA who helped him with paperwork    Continues to drink alcohol, sometimes one small shot and sometimes more, once a day  Did not try acamprosate, feels like if he wanted to stop drinking he could  Thinks he would like to gradually decrease amount he is drinking  He is more interested in a program than medication              Review of Systems  Constitutional, HEENT, cardiovascular, pulmonary, gi and gu systems are negative, except as otherwise noted.      Objective    /62   Pulse 114   Temp 98.8  F (37.1  C) (Oral)   Resp 16   Ht 1.655 m (5' 5.16\")   Wt 72.9 kg (160 lb 12.8 oz)   SpO2 98%   BMI 26.63 kg/m    Body mass index is 26.63 kg/m .  Physical Exam               Signed Electronically by: Rosalie Adams MD    "

## 2024-12-17 NOTE — Clinical Note
[facility],   I saw July today in clinic for an initial visit. He has HIV and Hep C. He was referred to MT for adherence issues and I saw him today (not quite sure why he wasn't scheduled with you). I did not identify any adherence issues, but I thought I'd pass the note along so you could assess if you would like to see him. Let me know if you have any questions!   Thanks,  Mackenzie Laboy, PharmD  Medication Therapy Management Pharmacy Resident

## 2024-12-18 NOTE — PROGRESS NOTES
I have verified the content of the note, which accurately reflects my assessment of the patient and the plan of care.   Isabel Leyva, LT[facility], located within [facility], PharmD

## 2025-01-06 ENCOUNTER — PATIENT OUTREACH (OUTPATIENT)
Dept: CARE COORDINATION | Facility: CLINIC | Age: 30
End: 2025-01-06

## 2025-01-06 ENCOUNTER — OFFICE VISIT (OUTPATIENT)
Dept: GASTROENTEROLOGY | Facility: CLINIC | Age: 30
End: 2025-01-06
Attending: STUDENT IN AN ORGANIZED HEALTH CARE EDUCATION/TRAINING PROGRAM
Payer: COMMERCIAL

## 2025-01-06 ENCOUNTER — LAB (OUTPATIENT)
Dept: LAB | Facility: CLINIC | Age: 30
End: 2025-01-06
Attending: STUDENT IN AN ORGANIZED HEALTH CARE EDUCATION/TRAINING PROGRAM
Payer: COMMERCIAL

## 2025-01-06 VITALS
OXYGEN SATURATION: 99 % | BODY MASS INDEX: 26.51 KG/M2 | WEIGHT: 159.13 LBS | HEIGHT: 65 IN | SYSTOLIC BLOOD PRESSURE: 145 MMHG | DIASTOLIC BLOOD PRESSURE: 97 MMHG | HEART RATE: 122 BPM

## 2025-01-06 DIAGNOSIS — B18.2 CHRONIC HEPATITIS C WITHOUT HEPATIC COMA (H): Primary | ICD-10-CM

## 2025-01-06 LAB
ALBUMIN SERPL BCG-MCNC: 4.6 G/DL (ref 3.5–5.2)
ALP SERPL-CCNC: 104 U/L (ref 40–150)
ALT SERPL W P-5'-P-CCNC: 90 U/L (ref 0–70)
ANION GAP SERPL CALCULATED.3IONS-SCNC: 17 MMOL/L (ref 7–15)
AST SERPL W P-5'-P-CCNC: 116 U/L (ref 0–45)
BILIRUB DIRECT SERPL-MCNC: 0.2 MG/DL (ref 0–0.3)
BILIRUB SERPL-MCNC: 0.6 MG/DL
BUN SERPL-MCNC: 7.9 MG/DL (ref 6–20)
CALCIUM SERPL-MCNC: 9.4 MG/DL (ref 8.8–10.4)
CHLORIDE SERPL-SCNC: 100 MMOL/L (ref 98–107)
CREAT SERPL-MCNC: 0.87 MG/DL (ref 0.67–1.17)
EGFRCR SERPLBLD CKD-EPI 2021: >90 ML/MIN/1.73M2
ERYTHROCYTE [DISTWIDTH] IN BLOOD BY AUTOMATED COUNT: 12.8 % (ref 10–15)
GLUCOSE SERPL-MCNC: 95 MG/DL (ref 70–99)
HCO3 SERPL-SCNC: 23 MMOL/L (ref 22–29)
HCT VFR BLD AUTO: 43.9 % (ref 40–53)
HGB BLD-MCNC: 14.8 G/DL (ref 13.3–17.7)
HOLD SPECIMEN: NORMAL
MCH RBC QN AUTO: 33 PG (ref 26.5–33)
MCHC RBC AUTO-ENTMCNC: 33.7 G/DL (ref 31.5–36.5)
MCV RBC AUTO: 98 FL (ref 78–100)
PLATELET # BLD AUTO: 201 10E3/UL (ref 150–450)
POTASSIUM SERPL-SCNC: 3.5 MMOL/L (ref 3.4–5.3)
PROT SERPL-MCNC: 9.1 G/DL (ref 6.4–8.3)
RBC # BLD AUTO: 4.49 10E6/UL (ref 4.4–5.9)
SODIUM SERPL-SCNC: 140 MMOL/L (ref 135–145)
WBC # BLD AUTO: 6.9 10E3/UL (ref 4–11)

## 2025-01-06 PROCEDURE — 80053 COMPREHEN METABOLIC PANEL: CPT | Performed by: PATHOLOGY

## 2025-01-06 PROCEDURE — 85027 COMPLETE CBC AUTOMATED: CPT | Performed by: PATHOLOGY

## 2025-01-06 PROCEDURE — G0463 HOSPITAL OUTPT CLINIC VISIT: HCPCS | Performed by: STUDENT IN AN ORGANIZED HEALTH CARE EDUCATION/TRAINING PROGRAM

## 2025-01-06 PROCEDURE — 99215 OFFICE O/P EST HI 40 MIN: CPT | Mod: GC | Performed by: STUDENT IN AN ORGANIZED HEALTH CARE EDUCATION/TRAINING PROGRAM

## 2025-01-06 PROCEDURE — 82248 BILIRUBIN DIRECT: CPT | Performed by: PATHOLOGY

## 2025-01-06 PROCEDURE — 36415 COLL VENOUS BLD VENIPUNCTURE: CPT | Performed by: PATHOLOGY

## 2025-01-06 ASSESSMENT — PAIN SCALES - GENERAL: PAINLEVEL_OUTOF10: NO PAIN (0)

## 2025-01-06 NOTE — PROGRESS NOTES
Clinic Care Coordination Contact  Community Health Worker Follow Up    Care Gaps:   Health Maintenance Due   Topic Date Due    ADVANCE CARE PLANNING  Never done    COPD ACTION PLAN  Never done    COVID-19 Vaccine (2 - Pfizer risk series) 02/14/2024    PHQ-2 (once per calendar year)  01/01/2025    YEARLY PREVENTIVE VISIT  02/01/2025     Preventive care visit is scheduled in April 2025.    Care Plan:   Care Plan: Pulmonology       Problem: Pulmonary hypertension       Goal: Patient will attend his pulmonology clinic appointment in the next 12 months.       Start Date: 1/24/2024 Expected End Date: 1/31/2025    This Visit's Progress: 80% Recent Progress: 70%    Note:     Barriers: language barrier, low literacy, noncompliance, and lack of knowledge how to navigate complex health care system  Strengths: motivated to attend appt  Patient expressed understanding of goal: Yes    Action steps to achieve this goal:  1. I will attend my rescheduled PFT and pulmonology appts on 4/09/2024. Completed.  2. I will attend my follow up pulmonology appt on 7/23/24. No showed rescheduled on 9/12/2024 at 2:15 PM for lab and with provider at 2:30 PM. Attended lab appt on 9/12, but not pulmonology appt.  3. I will attend my rescheduled pulmonology appointment on 9/26/24 at 2:00pm. Attended and Recommendations:   Come back in early February 2025 for a RHC with Dr. Johns, Echocardiogram, 6mwt & labs. Transportation is arranged with [employer].     Follow-up:   You will see Dr. Johns the same day as the testing  3. I will let my [facility] worker/coordinator know once the appointment is scheduled.   4. I will update CCC team know at outreach.                            Care Plan: ID clinic       Problem: ID clinic       Goal: Patient will attend ID clinic appointment as scheduled in the next 12 months.       Start Date: 3/28/2024 Expected End Date: 3/31/2025    This Visit's Progress: 80% Recent Progress: 80%    Note:     Barriers:  language barrier, low literacy, noncompliance, and lack of knowledge how to navigate complex health care system  Strengths: motivated to attend appt  Patient expressed understanding of goal: Yes    Action steps to achieve this goal:  1. I will attend my follow up appointment with infection doctor as scheduled on 7/11/2024 at 9:05 AM. Completed.  2. I will attend my follow up appointment as scheduled on 10/24/2024 at 9:45 AM. Completed  3. I will attend my 3 months ID clinic follow up on 2/06/25 at 9:05 AM with Dr Tello. CHW will set up ride at the next follow up visit.  4. I will update CCC team at outreach.                             Care Plan: GI       Problem: Chronic hepatitis C without hepatic coma       Goal: Patient will attend GI clinic appointment in the next 12 months.       Start Date: 3/28/2024 Expected End Date: 3/31/2025    This Visit's Progress: 80% Recent Progress: 80%    Note:     Barriers: language barrier, low literacy, noncompliance, and lack of knowledge how to navigate complex health care system  Strengths: motivated to attend appt  Patient expressed understanding of goal: Yes    Action steps to achieve this goal:  1. I will attend my GI appointment as scheduled on 5/1/2024 at 8:00am lab then 8:45 am with Dr Morales. Completed.   2. I will attend my follow up appointment on 9/04/2024 at 8:40 AM and will attend lab appointments prior. Ride arranged with [facility] [employer]. No showed.   3.  I will attend my follow up appointment on 1/6/25 at 1:15 PM for lab and 1:45pm with Dr Morales. Reminded patient today and Transportation is confirmed with [facility].  4. I will schedule a follow up appointment with my provider if it is recommended to do so while I am at the clinic.  5. I will follow up with CCC regarding this goal at each outreach until it is completed.                             Care Plan: Cardiology       Problem: HP GENERAL PROBLEM       Goal: Patient will attend his follow up cardiology  clinic appointment in the next 12 months.       Start Date: 1/6/2025 Expected End Date: 12/31/2025    This Visit's Progress: 20% Recent Progress: 10%    Note:     Barriers: language barrier, low literacy, noncompliance, and lack of knowledge how to navigate complex health care system  Strengths: motivated to attend appt  Patient expressed understanding of goal: Yes    Action steps to achieve this goal:  1. I will attend my RHC procedure on 2/5/25 at 7:00 am arrival time. Transportation is arranged with [employer] [facility].  2. I will schedule a follow up appointment with my cardiologist if it is recommended to do so while I am at the clinic.  3. I will follow up with CCC regarding this goal at each outreach until it is completed.                           Intervention and Education during outreach:  -CHW reminded patient of upcoming appointments and transportation is confirmed.   -Patient was informed to call with questions or concerns.     CHW Next Outreach: In one month.

## 2025-01-06 NOTE — LETTER
1/6/2025      Carolina Torres  [address]  [place] [address]      Dear Colleague,    Thank you for referring your patient, Carolina Torres, to the [facility] [place]. Please see a copy of my visit note below.    Hepatology Follow-Up Visit:         Follow up for Hepatitis C    Last visit:   New patient visit 5/1/24 - Initial GI visit for untreated Hep C for consideration of DAA. At that time he reported ongoing EtOH use, ~1 bottle (about the size of his forearm) every 2-3 days, which he had been doing for several years. Had FIB4 of 12.3, elevated INR, low albumin, palpable liver edge concerning for advanced liver disease/cirrhosis. Plan was to obtain abdominal US with elastography followed by treatment with Mavyret.     Patient completed elastography on 10/2/24 revealing advanced chronic liver disease with median liver stiffness of 2.25 m/sec and IQR/median value of 0.12. No focal hepatic lesion noted. Hep C viral load in 9/2024 was 12,200,000 from 8,590,000 in 1/2024. Reactive Hep B core Josefina and Surface Josefina with negative Surface Agn, consistent with prior infection. Hep A Josefina reactive.     He was started on Mavyret with his first dose on 11/17/24. Of note, patient instructed to stop his digoxin while on Mavyret due to drug interaction.     HISTORY OF PRESENT ILLNESS:   29 year old male with a PMHx pertinent for chronic Hepatitis C (Genotype 3A), HIV on Biktarvy (CD4 count 491 in 10/2024), severe pulmonary hypertension, polysubstance abuse (prior IVDU, cocaine) with ongoing heavy alcohol use presenting today for follow up.     Patient is Kostas speaking and telephone  used for the entirety of this clinical encounter.     Today, July confirms that he started Mavyret on 11/17/24, however he does endorse multiple missed doses (estimates that he's missed 7-8 days of medication in total). He continues consuming alcohol heavily, with a large bottle of vodka lasting him 3 days. He  acknowledges that he was instructed to stop using alcohol while taking Mavyret. He declines any referral to CD treatment resources stating that he's able to stop drinking alcohol without any additional assistance.     Patient denies any chest pain, shortness of breath, or palpitations. Not walking long distances currently which he attributes to the cold, but [place] prior [place]     Patient denies jaundice, lower extremity edema, abdominal distension, lethargy or confusion. He denies melena, hematemesis or hematochezia. Denies fevers, sweats or chills. Weight stable.    Review of systems  A targeted GI review of systems complete and is otherwise negative.     Surgical History:   Past Surgical History:   Procedure Laterality Date     CV RIGHT HEART CATH MEASUREMENTS RECORDED N/A 4/9/2024    Procedure: Heart Cath Right Heart Cath;  Surgeon: Andreas Johns MD;  Location:  HEART CARDIAC CATH LAB     CV RIGHT HEART CATH PULMONARY VASODILATOR STUDY N/A 4/9/2024    Procedure: Right Heart Cath Pulmonary Vasodilator Study;  Surgeon: Andreas Johns MD;  Location:  HEART CARDIAC CATH LAB       Other PMHx:  Past Medical History:   Diagnosis Date     Alcohol dependence (H)      Cannabis abuse      Current smoker      Hepatitis C      Human immunodeficiency virus (HIV) disease (H) 2019    upon arrival is on Dolutegravir/Lamivudine/Tenovir 50/300/300  one daily; CD4 788 (7/23)     Opioid use disorder, moderate, in sustained remission (H)     diagnosis upon [place] in [place] 1/[place]24     Other ascites      Pulmonary hypertension (H)     severely impaired right ventricular systolic function     Reaction to QuantiFERON-TB test (QFT) without active tuberculosis     class B1 upon arrival to [place]     Tattoos     right hand, left leg       Family History:   No family history on file.    Social History:   Social History     Tobacco Use     Smoking status:  "Every Day     Types: Cigarettes, Vaping Device     Passive exposure: Current     Smokeless tobacco: Current     Tobacco comments:     No chewing anymore, only vaping   Substance Use Topics     Alcohol use: Yes     Comment: drinks 1 glass daily, white alcohol, drinks after he eats a meal       Medications:   Current Outpatient Medications   Medication Sig Dispense Refill     ambrisentan (LETAIRIS) 10 MG tablet Take 1 tablet (10 mg) by mouth daily. 30 tablet 11     bictegravir-emtricitabine-tenofovir (BIKTARVY) -25 MG per tablet Take 1 tablet by mouth daily 30 tablet 11     digoxin (LANOXIN) 250 MCG tablet Take 1 tablet (250 mcg) by mouth daily 30 tablet 11     furosemide (LASIX) 20 MG tablet Take 1 tablet (20 mg) by mouth daily 90 tablet 3     glecaprevir-pibrentasvir (MAVYRET) 100-40 MG per tablet Take 3 tablets by mouth daily. 84 tablet 1     sildenafil (REVATIO) 20 MG tablet Take 1 tablet (20 mg) by mouth 3 times daily 90 tablet 11     Treprostinil (TYVASO DPI) 64 MCG inhaler Inhale 1 Inhalation (64 mcg) into the lungs 4 times daily.       No current facility-administered medications for this visit.        Allergies  No Known Allergies    Vitals:   BP (!) 145/97 (BP Location: Right arm, Cuff Size: Adult Regular)   Pulse (!) 122   Ht 1.651 m (5' 5\")   Wt 72.2 kg (159 lb 2 oz)   SpO2 99%   BMI 26.48 kg/m    Body mass index is 26.48 kg/m .      Physical Exam:   Constitutional: Well-developed, well-nourished, in no apparent distress.    HEENT: Normocephalic. No scleral icterus. Moist oral mucosa. Dentition poor.  Neck/Lymph: Normal ROM, supple. No thyromegaly.  No lymphadenopathy  Cardiac: Well perfused.   Respiratory: Breathing comfortably on room air.   GI:  Abdomen is non-distended.   Skin: Skin is warm and dry. No rash noted.  No jaundice. No spider nevi noted.  No palmar erythema  Peripheral Vascular: No lower extremity edema.   Musculoskeletal:  ROM intact, Normal muscle bulk    Psychiatric: Normal " mood and affect. Behavior is normal.  Neuro: No tremor.      Labs:   Lab Results   Component Value Date     01/06/2025    POTASSIUM 3.5 01/06/2025    CHLORIDE 100 01/06/2025    ANIONGAP 17 (H) 01/06/2025    CO2 23 01/06/2025    BUN 7.9 01/06/2025    CR 0.87 01/06/2025    GFRESTIMATED >90 01/06/2025    ASHLEY 9.4 01/06/2025      Lab Results   Component Value Date    WBC 6.9 01/06/2025    HGB 14.8 01/06/2025    HCT 43.9 01/06/2025    MCV 98 01/06/2025    MCH 33.0 01/06/2025    MCHC 33.7 01/06/2025    RDW 12.8 01/06/2025     01/06/2025     Lab Results   Component Value Date    ALBUMIN 4.6 01/06/2025    ALKPHOS 104 01/06/2025     (H) 01/06/2025     Lab Results   Component Value Date    INR 0.92 09/12/2024       MELD 3.0: 6 at 9/12/2024  2:43 PM  MELD-Na: 6 at 9/12/2024  2:43 PM  Calculated from:  Serum Creatinine: 0.75 mg/dL (Using min of 1 mg/dL) at 9/12/2024  2:43 PM  Serum Sodium: 138 mmol/L (Using max of 137 mmol/L) at 9/12/2024  2:43 PM  Total Bilirubin: 0.9 mg/dL (Using min of 1 mg/dL) at 9/12/2024  2:43 PM  Serum Albumin: 4.4 g/dL (Using max of 3.5 g/dL) at 9/12/2024  2:43 PM  INR(ratio): 0.92 (Using min of 1) at 9/12/2024  2:43 PM  Age at listing (hypothetical): 29 years  Sex: Male at 9/12/2024  2:43 PM    Procedures:  Liver biopsy: None  EGD: None  Colonoscopy: None    Relevant Imaging:   No images are attached to the encounter.     Assessment/Plan:   29 year old male 29 year old male with a PMHx pertinent for chronic, compensated Hepatitis C (Genotype 3A), HIV on Biktarvy (CD4 count 491 in 10/2024), severe pulmonary hypertension, polysubstance abuse (prior IVDU, cocaine) with ongoing heavy alcohol use presenting today for follow up.     #. Compensated Hepatitis C   #. EtOH Dependence   #. Abnormal Liver Tests   #. Hx of HIV   - Continues on 8 week course of Mavyret (first dose 11/17/24) though frequently missing doses   - Ongoing heavy alcohol consumption, not interested in CD resources today    - ALT (90 from 300), AST (116 from 334) improving   - Elastography in 10/2/24 with advanced chronic liver disease with median liver stiffness of 2.25 m/sec and IQR/median value of 0.12. No focal hepatic lesion noted.   - Hep C viral load in 9/2024 was 12,200,000 from 8,590,000 in 1/2024.   - Reactive Hep B core Josefina and Surface Josefina with negative Surface Agn, consistent with prior infection. Hep A Josefina reactive.     PLAN:   - Complete remainder of 8 week course of Mavyret   - Will obtain Hepatitis C RNA after treatment completion to help guide further management   - Follow up Abdominal US ordered today   - Continues to work on alcohol cessation, pt prefers to quit on his own     Return to Clinic: 6 months    Thank you very much for allowing me to participate in the care of this patient.  If you have any questions regarding my recommendations, please do not hesitate to contact me.          Patient discussed and seen with Staff Dr. Beckwith, who is in agreement with the above.     Binta Kwok MD  Internal Medicine-Pediatrics PGY1       Attestation signed by Addi Beckwith MD at 1/6/2025  5:27 PM:  The patient was seen and examined.  The above assessment and plan was developed jointly with the resident and the fellow.      I did spend total of 40 minutes (on the date of the encounter), including 30 minutes of face-to-face clinic time including counseling. The rest of the time was spent in documentation and review of records.      Thank you very much for allowing me to participate in the care of this patient.  If you have any questions regarding my recommendations, please do not hesitate to contact me.      Sincerely,         Addi Beckwith MD      Professor of Medicine  [employer]      Executive Medical Director, Solid Organ Transplant Program  [facility]      Again, thank you for allowing me to participate in the care of your patient.         Sincerely,        PAMELA HILLS MD    Electronically signed

## 2025-01-06 NOTE — PROGRESS NOTES
Hepatology Follow-Up Visit:         Follow up for Hepatitis C    Last visit:   New patient visit 5/1/24 - Initial GI visit for untreated Hep C for consideration of DAA. At that time he reported ongoing EtOH use, ~1 bottle (about the size of his forearm) every 2-3 days, which he had been doing for several years. Had FIB4 of 12.3, elevated INR, low albumin, palpable liver edge concerning for advanced liver disease/cirrhosis. Plan was to obtain abdominal US with elastography followed by treatment with Mavyret.     Patient completed elastography on 10/2/24 revealing advanced chronic liver disease with median liver stiffness of 2.25 m/sec and IQR/median value of 0.12. No focal hepatic lesion noted. Hep C viral load in 9/2024 was 12,200,000 from 8,590,000 in 1/2024. Reactive Hep B core Josefina and Surface Josefina with negative Surface Agn, consistent with prior infection. Hep A Josefina reactive.     He was started on Mavyret with his first dose on 11/17/24. Of note, patient instructed to stop his digoxin while on Mavyret due to drug interaction.     HISTORY OF PRESENT ILLNESS:   29 year old male with a PMHx pertinent for chronic Hepatitis C (Genotype 3A), HIV on Biktarvy (CD4 count 491 in 10/2024), severe pulmonary hypertension, polysubstance abuse (prior IVDU, cocaine) with ongoing heavy alcohol use presenting today for follow up.     Patient is Kostas speaking and telephone  used for the entirety of this clinical encounter.     Today, July confirms that he started Mavyret on 11/17/24, however he does endorse multiple missed doses (estimates that he's missed 7-8 days of medication in total). He continues consuming alcohol heavily, with a large bottle of vodka lasting him 3 days. He acknowledges that he was instructed to stop using alcohol while taking Mavyret. He declines any referral to CD treatment resources stating that he's able to stop drinking alcohol without any additional assistance.     Patient denies any chest  pain, shortness of breath, or palpitations. Not walking long distances currently which he attributes to the cold, but [place]     Patient denies jaundice, lower extremity edema, abdominal distension, lethargy or confusion. He denies melena, hematemesis or hematochezia. Denies fevers, sweats or chills. Weight stable.    Review of systems  A targeted GI review of systems complete and is otherwise negative.     Surgical History:   Past Surgical History:   Procedure Laterality Date    CV RIGHT HEART CATH MEASUREMENTS RECORDED N/A 4/9/2024    Procedure: Heart Cath Right Heart Cath;  Surgeon: Andreas Johns MD;  Location:  HEART CARDIAC CATH LAB    CV RIGHT HEART CATH PULMONARY VASODILATOR STUDY N/A 4/9/2024    Procedure: Right Heart Cath Pulmonary Vasodilator Study;  Surgeon: Andreas Johns MD;  Location:  HEART CARDIAC CATH LAB       Other PMHx:  Past Medical History:   Diagnosis Date    Alcohol dependence (H)     Cannabis abuse     Current smoker     Hepatitis C     Human immunodeficiency virus (HIV) disease (H) 2019    upon arrival is on Dolutegravir/Lamivudine/Tenovir 50/300/300  one daily; CD4 788 (7/23)    Opioid use disorder, moderate, in sustained remission (H)     diagnosis [place] 1[place]    Other ascites     Pulmonary hypertension (H)     severely impaired right ventricular systolic function    Reaction to QuantiFERON-TB test (QFT) without active tuberculosis     class B1 upon arrival to [place]    Tattoos     right hand, left leg       Family History:   No family history on file.    Social History:   Social History     Tobacco Use    Smoking status: Every Day     Types: Cigarettes, Vaping Device     Passive exposure: Current    Smokeless tobacco: Current    Tobacco comments:     No chewing anymore, only vaping   Substance Use Topics    Alcohol use: Yes     Comment: drinks 1 glass daily, white alcohol,  "drinks after he eats a meal       Medications:   Current Outpatient Medications   Medication Sig Dispense Refill    ambrisentan (LETAIRIS) 10 MG tablet Take 1 tablet (10 mg) by mouth daily. 30 tablet 11    bictegravir-emtricitabine-tenofovir (BIKTARVY) -25 MG per tablet Take 1 tablet by mouth daily 30 tablet 11    digoxin (LANOXIN) 250 MCG tablet Take 1 tablet (250 mcg) by mouth daily 30 tablet 11    furosemide (LASIX) 20 MG tablet Take 1 tablet (20 mg) by mouth daily 90 tablet 3    glecaprevir-pibrentasvir (MAVYRET) 100-40 MG per tablet Take 3 tablets by mouth daily. 84 tablet 1    sildenafil (REVATIO) 20 MG tablet Take 1 tablet (20 mg) by mouth 3 times daily 90 tablet 11    Treprostinil (TYVASO DPI) 64 MCG inhaler Inhale 1 Inhalation (64 mcg) into the lungs 4 times daily.       No current facility-administered medications for this visit.        Allergies  No Known Allergies    Vitals:   BP (!) 145/97 (BP Location: Right arm, Cuff Size: Adult Regular)   Pulse (!) 122   Ht 1.651 m (5' 5\")   Wt 72.2 kg (159 lb 2 oz)   SpO2 99%   BMI 26.48 kg/m    Body mass index is 26.48 kg/m .      Physical Exam:   Constitutional: Well-developed, well-nourished, in no apparent distress.    HEENT: Normocephalic. No scleral icterus. Moist oral mucosa. Dentition poor.  Neck/Lymph: Normal ROM, supple. No thyromegaly.  No lymphadenopathy  Cardiac: Well perfused.   Respiratory: Breathing comfortably on room air.   GI:  Abdomen is non-distended.   Skin: Skin is warm and dry. No rash noted.  No jaundice. No spider nevi noted.  No palmar erythema  Peripheral Vascular: No lower extremity edema.   Musculoskeletal:  ROM intact, Normal muscle bulk    Psychiatric: Normal mood and affect. Behavior is normal.  Neuro: No tremor.      Labs:   Lab Results   Component Value Date     01/06/2025    POTASSIUM 3.5 01/06/2025    CHLORIDE 100 01/06/2025    ANIONGAP 17 (H) 01/06/2025    CO2 23 01/06/2025    BUN 7.9 01/06/2025    CR 0.87 " 01/06/2025    GFRESTIMATED >90 01/06/2025    ASHLEY 9.4 01/06/2025      Lab Results   Component Value Date    WBC 6.9 01/06/2025    HGB 14.8 01/06/2025    HCT 43.9 01/06/2025    MCV 98 01/06/2025    MCH 33.0 01/06/2025    MCHC 33.7 01/06/2025    RDW 12.8 01/06/2025     01/06/2025     Lab Results   Component Value Date    ALBUMIN 4.6 01/06/2025    ALKPHOS 104 01/06/2025     (H) 01/06/2025     Lab Results   Component Value Date    INR 0.92 09/12/2024       MELD 3.0: 6 at 9/12/2024  2:43 PM  MELD-Na: 6 at 9/12/2024  2:43 PM  Calculated from:  Serum Creatinine: 0.75 mg/dL (Using min of 1 mg/dL) at 9/12/2024  2:43 PM  Serum Sodium: 138 mmol/L (Using max of 137 mmol/L) at 9/12/2024  2:43 PM  Total Bilirubin: 0.9 mg/dL (Using min of 1 mg/dL) at 9/12/2024  2:43 PM  Serum Albumin: 4.4 g/dL (Using max of 3.5 g/dL) at 9/12/2024  2:43 PM  INR(ratio): 0.92 (Using min of 1) at 9/12/2024  2:43 PM  Age at listing (hypothetical): 29 years  Sex: Male at 9/12/2024  2:43 PM    Procedures:  Liver biopsy: None  EGD: None  Colonoscopy: None    Relevant Imaging:   No images are attached to the encounter.     Assessment/Plan:   29 year old male 29 year old male with a PMHx pertinent for chronic, compensated Hepatitis C (Genotype 3A), HIV on Biktarvy (CD4 count 491 in 10/2024), severe pulmonary hypertension, polysubstance abuse (prior IVDU, cocaine) with ongoing heavy alcohol use presenting today for follow up.     #. Compensated Hepatitis C   #. EtOH Dependence   #. Abnormal Liver Tests   #. Hx of HIV   - Continues on 8 week course of Mavyret (first dose 11/17/24) though frequently missing doses   - Ongoing heavy alcohol consumption, not interested in CD resources today   - ALT (90 from 300), AST (116 from 334) improving   - Elastography in 10/2/24 with advanced chronic liver disease with median liver stiffness of 2.25 m/sec and IQR/median value of 0.12. No focal hepatic lesion noted.   - Hep C viral load in 9/2024 was 12,200,000  from 8,590,000 in 1/2024.   - Reactive Hep B core Josefina and Surface Josefina with negative Surface Agn, consistent with prior infection. Hep A Josefina reactive.     PLAN:   - Complete remainder of 8 week course of Mavyret   - Will obtain Hepatitis C RNA after treatment completion to help guide further management   - Follow up Abdominal US ordered today   - Continues to work on alcohol cessation, pt prefers to quit on his own     Return to Clinic: 6 months    Thank you very much for allowing me to participate in the care of this patient.  If you have any questions regarding my recommendations, please do not hesitate to contact me.          Patient discussed and seen with Staff Dr. Beckwith, who is in agreement with the above.     Binta Kwok MD  Internal Medicine-Pediatrics PGY1

## 2025-02-04 ENCOUNTER — PATIENT OUTREACH (OUTPATIENT)
Dept: CARE COORDINATION | Facility: CLINIC | Age: 30
End: 2025-02-04
Payer: COMMERCIAL

## 2025-02-04 NOTE — PROGRESS NOTES
Clinic Care Coordination Contact  Community Health Worker Follow Up    Care Gaps:   Health Maintenance Due   Topic Date Due    ADVANCE CARE PLANNING  Never done    COPD ACTION PLAN  Never done    ZOSTER IMMUNIZATION (1 of 2) Never done    COVID-19 Vaccine (2 - Pfizer risk series) 02/14/2024    YEARLY PREVENTIVE VISIT  02/01/2025     Scheduled on 4/21/2025 for preventive care visit.      Care Plan:   Care Plan: Pulmonology       Problem: Pulmonary hypertension       Goal: Patient will attend his pulmonology clinic appointment in the next 12 months.       Start Date: 1/24/2024 Expected End Date: 1/31/2025    This Visit's Progress: 80% Recent Progress: 80%    Note:     Barriers: language barrier, low literacy, noncompliance, and lack of knowledge how to navigate complex health care system  Strengths: motivated to attend appt  Patient expressed understanding of goal: Yes    Action steps to achieve this goal:  1. I will attend my rescheduled PFT and pulmonology appts on 4/09/2024. Completed.  2. I will attend my follow up pulmonology appt on 7/23/24. No showed rescheduled on 9/12/2024 at 2:15 PM for lab and with provider at 2:30 PM. Attended lab appt on 9/12, but not pulmonology appt.  3. I will attend my rescheduled pulmonology appointment on 9/26/24 at 2:00pm. Attended and Recommendations:   4. I will attend my appointment as scheduled on 2/05/2025 at 9:30 AM C with Dr. Johns, Echocardiogram, 6mwt & labs. Transportation is arranged with [address].                            Care Plan: ID clinic       Problem: ID clinic       Goal: Patient will attend ID clinic appointment as scheduled in the next 12 months.       Start Date: 3/28/2024 Expected End Date: 3/31/2025    This Visit's Progress: 80% Recent Progress: 80%    Note:     Barriers: language barrier, low literacy, noncompliance, and lack of knowledge how to navigate complex health care system  Strengths: motivated to attend appt  Patient expressed  understanding of goal: Yes    Action steps to achieve this goal:  1. I will attend my follow up appointment with infection doctor as scheduled on 7/11/2024 at 9:05 AM. Completed.  2. I will attend my follow up appointment as scheduled on 10/24/2024 at 9:45 AM. Completed  3. I will attend my 3 months ID clinic follow up on 2/06/25 at 9:05 AM with Dr Tello. CHW reminded and transportation is arranged with [employer] [facility].  4. I will update CCC team at outreach.                             Care Plan: GI       Problem: Chronic hepatitis C without hepatic coma       Goal: Patient will attend GI clinic appointment in the next 12 months.       Start Date: 3/28/2024 Expected End Date: 3/31/2025    This Visit's Progress: 80% Recent Progress: 80%    Note:     Barriers: language barrier, low literacy, noncompliance, and lack of knowledge how to navigate complex health care system  Strengths: motivated to attend appt  Patient expressed understanding of goal: Yes    Action steps to achieve this goal:  1. I will attend my GI appointment as scheduled on 5/1/2024 at 8:00am lab then 8:45 am with Dr Morales. Completed.   2. I will attend my follow up appointment on 9/04/2024 at 8:40 AM and will attend lab appointments prior. Ride arranged with [employer]. No showed.   3.  I will attend my follow up appointment on 1/6/25 at 1:15 PM for lab and 1:45pm with Dr Morales. Completed.  4. I will attend my follow up appointment with GI specialist as scheduled on 7/21/2025 at 1:45 PM.  5. I will follow up with CCC regarding this goal at each outreach until it is completed.                             Care Plan: Cardiology       Problem: HP GENERAL PROBLEM       Goal: Patient will attend his follow up cardiology clinic appointment in the next 12 months.       Start Date: 1/6/2025 Expected End Date: 12/31/2025    This Visit's Progress: 30% Recent Progress: 20%    Note:     Barriers: language barrier, low literacy, noncompliance, and lack of  knowledge how to navigate complex health care system  Strengths: motivated to attend appt  Patient expressed understanding of goal: Yes    Action steps to achieve this goal:  1. I will attend my RHC procedure on 2/5/25 at 7:00 am arrival time. Transportation is arranged with [employer] [facility].  2. I will schedule a follow up appointment with my cardiologist if it is recommended to do so while I am at the clinic.  3. I will follow up with CCC regarding this goal at each outreach until it is completed.                           Intervention and Education during outreach:  -Patient is still connected with case management through a resettlement agency, [facility] for intensive case management due to high and complex need and this will allow up to 2 years which will anticipate to end sometimes in the summer.   -CHW attempted to confirm for [facility] services with [facility] and was informed that patient was not able to reach therefor, DA is still pending. CHW will receive a call back to confirm if patient will continue to get DA from [facility].   -CHW reminded patient of upcoming appointments, transportation arranged as appropriate and patient to call with questions or concerns.     CHW Next Outreach: In one month.

## 2025-02-05 ENCOUNTER — OFFICE VISIT (OUTPATIENT)
Dept: PULMONOLOGY | Facility: CLINIC | Age: 30
End: 2025-02-05
Attending: PHYSICIAN ASSISTANT
Payer: COMMERCIAL

## 2025-02-05 ENCOUNTER — LAB (OUTPATIENT)
Dept: LAB | Facility: CLINIC | Age: 30
End: 2025-02-05
Attending: INTERNAL MEDICINE
Payer: COMMERCIAL

## 2025-02-05 ENCOUNTER — APPOINTMENT (OUTPATIENT)
Dept: MEDSURG UNIT | Facility: CLINIC | Age: 30
End: 2025-02-05
Payer: COMMERCIAL

## 2025-02-05 DIAGNOSIS — I27.20 PULMONARY HYPERTENSION (H): ICD-10-CM

## 2025-02-05 DIAGNOSIS — R06.09 DOE (DYSPNEA ON EXERTION): ICD-10-CM

## 2025-02-05 DIAGNOSIS — B18.2 CHRONIC HEPATITIS C WITHOUT HEPATIC COMA (H): ICD-10-CM

## 2025-02-05 LAB
6 MIN WALK (FT): 1640 FT
6 MIN WALK (M): 500 M
FIO2-PRE: 21 %
INR PPP: 0.98 (ref 0.85–1.15)

## 2025-02-05 PROCEDURE — 85610 PROTHROMBIN TIME: CPT | Performed by: PATHOLOGY

## 2025-02-05 PROCEDURE — 94618 PULMONARY STRESS TESTING: CPT | Performed by: INTERNAL MEDICINE

## 2025-02-05 PROCEDURE — 36415 COLL VENOUS BLD VENIPUNCTURE: CPT | Performed by: PATHOLOGY

## 2025-02-06 ENCOUNTER — OFFICE VISIT (OUTPATIENT)
Dept: INFECTIOUS DISEASES | Facility: CLINIC | Age: 30
End: 2025-02-06
Attending: INTERNAL MEDICINE
Payer: COMMERCIAL

## 2025-02-06 ENCOUNTER — LAB (OUTPATIENT)
Dept: LAB | Facility: CLINIC | Age: 30
End: 2025-02-06
Payer: COMMERCIAL

## 2025-02-06 VITALS
DIASTOLIC BLOOD PRESSURE: 64 MMHG | SYSTOLIC BLOOD PRESSURE: 120 MMHG | OXYGEN SATURATION: 100 % | HEART RATE: 104 BPM | TEMPERATURE: 98.8 F

## 2025-02-06 DIAGNOSIS — B20 HUMAN IMMUNODEFICIENCY VIRUS (HIV) DISEASE (H): ICD-10-CM

## 2025-02-06 LAB
ALBUMIN SERPL BCG-MCNC: 4.3 G/DL (ref 3.5–5.2)
ALP SERPL-CCNC: 108 U/L (ref 40–150)
ALT SERPL W P-5'-P-CCNC: 114 U/L (ref 0–70)
ANION GAP SERPL CALCULATED.3IONS-SCNC: 14 MMOL/L (ref 7–15)
AST SERPL W P-5'-P-CCNC: 273 U/L (ref 0–45)
BASOPHILS # BLD AUTO: 0 10E3/UL (ref 0–0.2)
BASOPHILS NFR BLD AUTO: 1 %
BILIRUB SERPL-MCNC: 0.4 MG/DL
BUN SERPL-MCNC: 8.2 MG/DL (ref 6–20)
CALCIUM SERPL-MCNC: 9 MG/DL (ref 8.8–10.4)
CD3 CELLS # BLD: 1971 CELLS/UL (ref 603–2990)
CD3 CELLS NFR BLD: 74 % (ref 49–84)
CD3+CD4+ CELLS # BLD: 694 CELLS/UL (ref 441–2156)
CD3+CD4+ CELLS NFR BLD: 26 % (ref 28–63)
CD3+CD4+ CELLS/CD3+CD8+ CLL BLD: 0.59 % (ref 1.4–2.6)
CD3+CD8+ CELLS # BLD: 1176 CELLS/UL (ref 125–1312)
CD3+CD8+ CELLS NFR BLD: 44 % (ref 10–40)
CHLORIDE SERPL-SCNC: 104 MMOL/L (ref 98–107)
CREAT SERPL-MCNC: 0.86 MG/DL (ref 0.67–1.17)
EGFRCR SERPLBLD CKD-EPI 2021: >90 ML/MIN/1.73M2
EOSINOPHIL # BLD AUTO: 0.3 10E3/UL (ref 0–0.7)
EOSINOPHIL NFR BLD AUTO: 6 %
ERYTHROCYTE [DISTWIDTH] IN BLOOD BY AUTOMATED COUNT: 13.1 % (ref 10–15)
GLUCOSE SERPL-MCNC: 107 MG/DL (ref 70–99)
HCO3 SERPL-SCNC: 24 MMOL/L (ref 22–29)
HCT VFR BLD AUTO: 37.6 % (ref 40–53)
HGB BLD-MCNC: 12.7 G/DL (ref 13.3–17.7)
IMM GRANULOCYTES # BLD: 0 10E3/UL
IMM GRANULOCYTES NFR BLD: 0 %
LYMPHOCYTES # BLD AUTO: 2.4 10E3/UL (ref 0.8–5.3)
LYMPHOCYTES NFR BLD AUTO: 45 %
MCH RBC QN AUTO: 33.2 PG (ref 26.5–33)
MCHC RBC AUTO-ENTMCNC: 33.8 G/DL (ref 31.5–36.5)
MCV RBC AUTO: 98 FL (ref 78–100)
MONOCYTES # BLD AUTO: 0.5 10E3/UL (ref 0–1.3)
MONOCYTES NFR BLD AUTO: 10 %
NEUTROPHILS # BLD AUTO: 2 10E3/UL (ref 1.6–8.3)
NEUTROPHILS NFR BLD AUTO: 39 %
PLATELET # BLD AUTO: 119 10E3/UL (ref 150–450)
POTASSIUM SERPL-SCNC: 3.4 MMOL/L (ref 3.4–5.3)
PROT SERPL-MCNC: 8.3 G/DL (ref 6.4–8.3)
RBC # BLD AUTO: 3.82 10E6/UL (ref 4.4–5.9)
SODIUM SERPL-SCNC: 142 MMOL/L (ref 135–145)
T CELL COMMENT: ABNORMAL
T PALLIDUM AB SER QL: NONREACTIVE
WBC # BLD AUTO: 5.2 10E3/UL (ref 4–11)

## 2025-02-06 NOTE — PROGRESS NOTES
[facility] INFECTIOUS DISEASE CLINIC [facility]    Date: 02/06/2025   Patient Name: Carolina Torres   YOB: 1995  MRN: [id number]      ASSESSMENT:  29 year old man followed for HIV management.    HIV infection.  Diagnosed in [facility] ~2023.  Outside records show a CD4 count of 788 in July 2023.  Duration of infection unclear.  Initially on combination pill of dolutegravir, lamivudine, tenofovir DF.  Viral load undetectable and CD4 count 589 at initial visit. No pretreatment genotype available.  Patient reports acquisition from sharing needles.  Unknown history of opportunistic infections.  Currently on Biktarvy, missing 2-3 doses a month. Uses pillbox. Last viral load undetectable  TB screening completed. Negative quantiferon test. AFB sputum x 3 negative Sept 2023  Hepatitis C.  Presumed from IV drug use.  Genotype 3A.  Viral load 8,590,000.  Has referral placed to GI for May.  Hepatitis B surface antigen negative with immunity from previous infection (positive core and surface antibodies).  Also has documentation of vaccination on 4/5/2013, 6/6/2013, and 7/17/2023 hepatitis A vaccine x 1 on 2/1/2024. Followed by GI. Recently completed treatment with Mavyret; repeat testing pending  Pulmonary hypertension.  Diagnosed prior to arrival.  Outpatient chest x-ray with cardiomegaly.  Seeing cardiology  Chronic tobacco use. Currently vaping only   EtOH use. Denies use, but previous providers reporting more significant use. Says he hasn't been drinking recently.  History of polysubstance abuse, including IV drug use.  Reports sobriety    PLAN:  -continue biktarvy 1 pill daily. Discussed importance to regular adherence. Referred to [facility], but did not see HIV pharmacist. Appears to be doing better with viral suppression  -cmp, cbc, viral load, CD4 count today, quantiferon, and syphilis testing    Return to clinic in 6 months.    Augustine Tello MD  [facility]   Clinic phone: [phone]   Clinic  fax: [phone]     ______________________________________________________________________    HISTORY OF PRESENT ILLNESS:   From initial visit on 2/29/24:    Carolina Torres is a 28 year old man who is referred for evaluation of HIV.  Patient recently immigrated from [facility] in January of this year.  He was recently evaluated in primary care clinic and referred to ID due to a diagnosis of HIV.  He has extensive immigration records that are scanned and were reviewed today.  The patient has a known history of polysubstance abuse, including IV drug use.  He believes this is how he was infected with HIV.  Timeline of infection is unclear.  He denies any significant opportunistic infections in the past.  He does tell me that he was diagnosed over the past year and started on treatment.  He is on triple drug therapy.  He takes this regularly, but does miss occasional doses.  He is currently living with his wife and 3 children.  2 of his children are school-age and 1 is 16 months old.  His wife and middle daughter are both diagnosed with HIV and currently on treatment.  Today he has no acute complaints.  He does not have any questions regarding HIV diagnosis.  He knows that he needs to stay on his medications.  He does have a lesion on his nose that has been present for about a week.  He says that this started out as a pimple and is now draining.  He is not taking any medicine for this.    Outside records were reviewed which show workup for tuberculosis with sputum AFB smear and culture x 3 in September 2023.  Smear and cultures were negative.  He has a recent chest x-ray which indicates cardiomegaly without any other report of cavitary lesions or infiltrates.  He is known to be positive for hepatitis C.  HIV test was positive on 7/17/2023 (unknown if there were prior positive test).  Additional labs in July showed negative hepatitis B surface antigen, negative treponemal antibody.  An ultrasound of the liver showed enlarged  liver smooth surface with normal parenchymal echogenicity without gross focal lesion.  Enlarged intrahepatic IVC and hepatic vein.  No intrahepatic bile duct dilation is observed.  The visualized proximal CBD is measured about 0.3 cm.  Gallbladder is well-distended without gross stone.  Diffuse gallbladder wall thickening is noted.  The visualized part of pancreas appeared normal.  Spleen is measured about 11 cm in length.  Both kidneys are of normal size and echotexture.  Small ascites is noted.    Additional labs done following moved to [place] include negative QuantiFERON test, negative treponemal antibody, negative hepatitis B surface antigen, positive hepatitis B core and surface antibody, positive hepatitis C antibody with elevated viral load, genotype 3A.  Normal kidney and liver testing (mildly elevated AST)      Interval History:  Here for follow-up. Feels well, no new complaints. Says he takes his biktarvy, rarely misses a dose (2-3 times/month).      from his wife. Lives with his mother. Denies recent sexual activity. Denies recent alcohol use. Not currently employed.    Seen with ipad     Review of Systems:  No fevers or chills      Past Medical History:  Past Medical History:   Diagnosis Date    Alcohol dependence (H)     Cannabis abuse     Current smoker     Hepatitis C     Human immunodeficiency virus (HIV) disease (H) 2019    upon arrival is on Dolutegravir/Lamivudine/Tenovir 50/300/300  one daily; CD4 788 (7/23)    Opioid use disorder, moderate, in sustained remission (H)     diagnosis [place] in [place] 1/2024    Other ascites     Pulmonary hypertension (H)     severely impaired right ventricular systolic function    Reaction to QuantiFERON-TB test (QFT) without active tuberculosis     class B1 upon arrival to [place]    Tattoos     right hand, left leg       Past Surgical History:  Past Surgical History:   Procedure Laterality Date    CV RIGHT HEART CATH MEASUREMENTS  RECORDED N/A 4/9/2024    Procedure: Heart Cath Right Heart Cath;  Surgeon: Andreas Johns MD;  Location:  HEART CARDIAC CATH LAB    CV RIGHT HEART CATH PULMONARY VASODILATOR STUDY N/A 4/9/2024    Procedure: Right Heart Cath Pulmonary Vasodilator Study;  Surgeon: Andreas Johns MD;  Location:  HEART CARDIAC CATH LAB       Allergies:  No Known Allergies    Medications:    Current Outpatient Medications:     ambrisentan (LETAIRIS) 10 MG tablet, Take 1 tablet (10 mg) by mouth daily., Disp: 30 tablet, Rfl: 11    bictegravir-emtricitabine-tenofovir (BIKTARVY) -25 MG per tablet, Take 1 tablet by mouth daily, Disp: 30 tablet, Rfl: 11    digoxin (LANOXIN) 250 MCG tablet, Take 1 tablet (250 mcg) by mouth daily, Disp: 30 tablet, Rfl: 11    furosemide (LASIX) 20 MG tablet, Take 1 tablet (20 mg) by mouth daily, Disp: 90 tablet, Rfl: 3    sildenafil (REVATIO) 20 MG tablet, Take 1 tablet (20 mg) by mouth 3 times daily, Disp: 90 tablet, Rfl: 11    Treprostinil (TYVASO DPI) 64 MCG inhaler, Inhale 1 Inhalation (64 mcg) into the lungs 4 times daily., Disp: , Rfl:     glecaprevir-pibrentasvir (MAVYRET) 100-40 MG per tablet, Take 3 tablets by mouth daily. (Patient not taking: Reported on 2/6/2025), Disp: 84 tablet, Rfl: 1    Social History:  Social History     Socioeconomic History    Marital status:      Spouse name: Not on file    Number of children: Not on file    Years of education: Not on file    Highest education level: Not on file   Occupational History    Not on file   Tobacco Use    Smoking status: Every Day     Types: Cigarettes     Passive exposure: Current    Smokeless tobacco: Current     Types: Chew    Tobacco comments:     Betel nut with tobacco    Vaping Use    Vaping Use: Never used   Substance and Sexual Activity    Alcohol use: Not on file    Drug use: Not on file    Sexual activity: Not on file   Other Topics Concern    Not on file   Social History Narrative    As of 1/2024         [place] in [place]: 2024        Marrital status:  to  Henrik Griffin ( 1997) as of         Living situation: lives in an apartment with spouse and 3 kids            Languages spoken: Jac [place]            Past employment: farmer            Place of birth: [place]    Years in a refugee [place]: 27 (all life until [place] to [place]        Family not in [place]:     Family in [place]:         Education previous [place] the [place]: [place] 2    Education started in the [place]:none            Jehovah's witness: not asked        A#: [phone]         Social Determinants of Health     Financial Resource Strain: Low Risk  (2024)    Financial Resource Strain     Within the past 12 months, have you or your family members you live with been unable to get utilities (heat, electricity) when it was really needed?: No   Food Insecurity: Low Risk  (2024)    Food Insecurity     Within the past 12 months, did you worry that your food would run out before you got money to buy more?: No     Within the past 12 months, did the food you bought just not last and you didn t have money to get more?: No   Transportation Needs: Low Risk  (2024)    Transportation Needs     Within the past 12 months, has lack of transportation kept you from medical appointments, getting your medicines, non-medical meetings or appointments, work, or from getting things that you need?: No   Physical Activity: Not on file   Stress: Not on file   Social Connections: Not on file   Interpersonal Safety: Low Risk  (2024)    Interpersonal Safety     Do you feel physically and emotionally safe where you currently live?: Yes     Within the past 12 months, have you been hit, slapped, kicked or otherwise physically hurt by someone?: No     Within the past 12 months, have you been humiliated or emotionally abused in other ways by your partner or ex-partner?: No   Housing Stability: Low Risk  (2024)    Housing Stability     Do you have housing? :  Yes     Are you worried about losing your housing?: No        PHYSICAL EXAM:    /64 (BP Location: Right arm, Patient Position: Sitting, Cuff Size: Adult Regular)   Pulse 104   Temp 98.8  F (37.1  C) (Oral)   SpO2 100%     GENERAL:  well-developed, well-nourished, in no acute distress.   HENT:  Head is normocephalic, atraumatic. Yellow chalk on face for acne. Oropharynx is moist without exudates or ulcers.  No thrush.  Terrible dentition.  EYES:  Eyes have anicteric sclerae without conjunctival injection or stigmata of endocarditis.    NECK:  Supple. No lymphadenopathy  LUNGS:  Clear to auscultation.  CARDIOVASCULAR:  Regular rate and rhythm with no murmurs, gallops or rubs.  ABDOMEN:  Normal bowel sounds, soft, nontender.    MUSCULOSKELETAL: Extremities warm and without edema. No joint swelling.  SKIN:  No acute rashes.   NEUROLOGIC: Grossly nonfocal. Normal gait and station.          Pertinent labs:    Lab Results   Component Value Date     01/06/2025    POTASSIUM 3.5 01/06/2025    CHLORIDE 100 01/06/2025    CO2 23 01/06/2025    BUN 7.9 01/06/2025    CR 0.87 01/06/2025    GLC 95 01/06/2025       Lab Results   Component Value Date    WBC 6.9 01/06/2025    HGB 14.8 01/06/2025    HCT 43.9 01/06/2025     01/06/2025    MCV 98 01/06/2025    RDW 12.8 01/06/2025        Lab Results   Component Value Date    BILITOTAL 0.6 01/06/2025     (H) 01/06/2025    ALT 90 (H) 01/06/2025    PROTTOTAL 9.1 (H) 01/06/2025    ALBUMIN 4.6 01/06/2025    ALKPHOS 104 01/06/2025    INR 0.98 02/05/2025            MICROBIOLOGY DATA:  None    RADIOLOGY:  Reviewed      Attestation:  Total time preparing to see this patient, face-to-face time, and coordinating care time on the same calendar date: 21 minutes.  Face-face time: 14 minutes.  Over 50% of face-to-face time was spent in counseling/coordination of care.     The longitudinal plan of care for the diagnosis(es)/condition(s) as documented were addressed during this  visit. Due to the added complexity in care, I will continue to support July in the subsequent management and with ongoing continuity of care.

## 2025-02-13 DIAGNOSIS — Z53.9 DIAGNOSIS NOT YET DEFINED: Primary | ICD-10-CM

## 2025-02-13 PROCEDURE — G0179 MD RECERTIFICATION HHA PT: HCPCS | Mod: 4MD | Performed by: FAMILY MEDICINE

## 2025-03-06 ENCOUNTER — PATIENT OUTREACH (OUTPATIENT)
Dept: NURSING | Facility: CLINIC | Age: 30
End: 2025-03-06
Payer: COMMERCIAL

## 2025-03-06 NOTE — PROGRESS NOTES
Clinic Care Coordination Contact  Care Coordination Clinician Chart Review    Situation: Patient chart reviewed by Care Coordinator.       Background: Care Coordination Program started: 1/24/2024. Initial assessment completed and patient-centered care plan(s) were developed with participation from patient. Lead CC handed patient off to CHW for continued outreaches.       Assessment: Per chart review, patient outreach completed by CC CHW on 2/4/25.  Patient is actively working to accomplish goal(s). Patient's goal(s) appropriate and relevant at this time. Patient is not due for updated Plan of Care.  Assessments will be completed annually or as needed/with change of patient status.    Moved CHW outreach from 3/13/25 to 3/10/25.     CHW delegations:   - Green card appt reminder.  - Assist patient reschedule ECHO and procedure no showed on 2/5/25.   - follow up on [facility] service status at [facility].    PCA service   Consulted with Ramon, CHW to day. Per Ramon, he thinks he already submitted the referral but not sure when. Ramon plans to follow up at his upcoming outreach scheduled on 3/10/25. Ramon instructed CCRN not to create new goal yet.     Per Ramon: He does not want PCA but [employer] [place] heped him applied SSI already.     SSI  Consulted with Ramon, CHW to day. Per Ramon, he thinks he already submitted the referral but not sure when. Ramon plans to follow up at his upcoming outreach scheduled on 3/10/25. Ramon instructed CCRN not to create new goal yet.     Per Ramon:  is representing him and the case is in medical claim for review and next step is for interview for determination of eligibility.     [facility] worker   Ramon already connected to [facility] at last outreach and per Ramon, no updates yet but he will follow up with [facility] again at upcoming outreach.     Pumonology  Per chart review, patient attended the PFT appointment on 2/5/25. Goal completed.     Cardiology   RHC procedure and ECHO no showed on 2/5/25.  CHW to assist patient reschedule.     ECHO   Patient no showed his ECHO appt on 2/5/25. CHW to assist.     ID follow up  Per chart review, patient attended his follow-up ID clinic appointment on 2/6/25 and follow-up appointment scheduled on 8/7/25 at 9:05 am. Previous goal completed and new goal created today.     Hepatology follow up    Per chart review, patient attended his follow up hepatology appointment on 1/6/25 and follow-up appointment scheduled on 7/21/25 with lab. Goal updated today. Previous goal completed and new goal created today.       Care Plan: Cardiology Completed 1/6/2025      Problem: Pulmonary hypertension  Resolved 1/6/2025      Goal: Patient will attend his cardiology clinic appointment in the next 12 months.  Completed 1/6/2025      Start Date: 1/24/2024 Expected End Date: 1/30/2025    This Visit's Progress: 100% Recent Progress: 80%    Note:     Barriers: language barrier, low literacy, noncompliance, and lack of knowledge how to navigate complex health care system  Strengths: motivated to attend appt  Patient expressed understanding of goal: Yes    Action steps to achieve this goal:  1. I will answer my phone when cardiology clinic call to schedule follow up appointment.   2. I will attend my ECHO appointment as scheduled 2/7/2024 at 9:00am. Completed.   3. I will attend my initial cardiology appointment on 4/1/2024 at 10:15am with Andreas Johns MD. Completed.  4. I will attend my appointment for ECHO as scheduled on 4/09/2024 at 11:00 AM. Completed.  5. I will attend my follow up cardiology appointment on 8/20/24 at 8:00am. Completed.  6. Patient will attend his follow up cardiology appointment on 10/21/2024 at 7:45am for lab and 8:45 AM with Dr Johns. Cancelled and inappropriate visit per cancel reason noted. Patient is rescheduled for procedure on 2/05/2025.  7. I will update CCC team at outreach.                             Care Plan: Pulmonology Completed 3/6/2025       Problem: Pulmonary hypertension  Resolved 3/6/2025      Goal: Patient will attend his pulmonology clinic appointment in the next 12 months.  Completed 3/6/2025      Start Date: 1/24/2024 Expected End Date: 1/31/2025    This Visit's Progress: 100% Recent Progress: 80%    Note:     Barriers: language barrier, low literacy, noncompliance, and lack of knowledge how to navigate complex health care system  Strengths: motivated to attend appt  Patient expressed understanding of goal: Yes    Action steps to achieve this goal:  1. I will attend my rescheduled PFT and pulmonology appts on 4/09/2024. Completed.  2. I will attend my follow up pulmonology appt on 7/23/24. No showed rescheduled on 9/12/2024 at 2:15 PM for lab and with provider at 2:30 PM. Attended lab appt on 9/12, but not pulmonology appt.  3. I will attend my rescheduled pulmonology appointment on 9/26/24 at 2:00pm. Attended and Recommendations:   4. I will attend my appointment as scheduled on 2/05/2025 at 9:30 AM RHC with Dr. Johns, Echocardiogram, 6mwt & labs. Completed but no showed ECHO and procedure.                             Care Plan: ID clinic Completed 3/6/2025      Problem: ID clinic  Resolved 3/6/2025      Goal: Patient will attend ID clinic appointment as scheduled in the next 12 months.  Completed 3/6/2025      Start Date: 3/28/2024 Expected End Date: 3/31/2025    This Visit's Progress: 100% Recent Progress: 80%    Note:     Barriers: language barrier, low literacy, noncompliance, and lack of knowledge how to navigate complex health care system  Strengths: motivated to attend appt  Patient expressed understanding of goal: Yes    Action steps to achieve this goal:  1. I will attend my follow up appointment with infection doctor as scheduled on 7/11/2024 at 9:05 AM. Completed.  2. I will attend my follow up appointment as scheduled on 10/24/2024 at 9:45 AM. Completed  3. I will attend my 3 months ID clinic follow up on 2/06/25 at 9:05 AM with  Dr Tello. Completed.   4. I will update CCC team at outreach.                             Care Plan: GI/hepatology Completed 3/6/2025      Problem: Chronic hepatitis C without hepatic coma  Resolved 3/6/2025      Goal: Patient will attend GI clinic appointment in the next 12 months.  Completed 3/6/2025      Start Date: 3/28/2024 Expected End Date: 3/31/2025    This Visit's Progress: 100% Recent Progress: 80%    Note:     Barriers: language barrier, low literacy, noncompliance, and lack of knowledge how to navigate complex health care system  Strengths: motivated to attend appt  Patient expressed understanding of goal: Yes    Action steps to achieve this goal:  1. I will attend my GI appointment as scheduled on 5/1/2024 at 8:00am lab then 8:45 am with Dr Morales. Completed.   2. I will attend my follow up appointment on 9/04/2024 at 8:40 AM and will attend lab appointments prior. Ride arranged with [facility]. No showed.   3.  I will attend my follow up appointment on 1/6/25 at 1:15 PM for lab and 1:45pm with Dr Morales. Completed.  4. I will attend my follow up appointment with GI specialist as scheduled on 7/21/2025 at 1:45 PM.  5. I will follow up with CCC regarding this goal at each outreach until it is completed.                             Care Plan: Cardiology       Problem: Pulmonary hypertension       Goal: Patient will attend his follow up cardiology clinic appointment in the next 12 months.       Start Date: 1/6/2025 Expected End Date: 12/31/2025    Recent Progress: 30%    Note:     Barriers: language barrier, low literacy, noncompliance, and lack of knowledge how to navigate complex health care system  Strengths: motivated to attend appt  Patient expressed understanding of goal: Yes    Action steps to achieve this goal:  1. I will attend my RHC procedure on 2/5/25 at 7:00 am arrival time. No showed.  2. I will schedule a follow up appointment with my cardiologist if it is recommended to do so while I  am at the clinic.  3. I will follow up with CCC regarding this goal at each outreach until it is completed.                             Care Plan: [facility] service           Care Plan: GI/Hepatology       Problem: Chronic hepatitis C without hepatic coma       Goal: Patient will attend his follow up GI/hepatology clinic appointment in the next 12 months.       Start Date: 3/6/2025 Expected End Date: 3/31/2026    This Visit's Progress: 10%    Note:     Barriers: language barrier, low literacy, noncompliance, and lack of knowledge how to navigate complex health care system  Strengths: motivated to attend appt  Patient expressed understanding of goal: Yes    Action steps to achieve this goal:  1. I will attend my follow-up appointment with GI specialist as scheduled on 7/21/2025 at 1:45 PM with Nanette Sloan and lab at 1:00 pm.   2. I will follow up with CCC regarding this goal at each outreach until it is completed.        HEPATOLOGY - 3rd Floor  3A    Department Address: [address] [place] [id number]   Department Phone: [phone]                               Care Plan: ID clinic follow-up       Problem: follow-up       Goal: Patient will attend his follow-up ID clinic appointment in the next 12 months.       Start Date: 3/6/2025 Expected End Date: 3/31/2026    This Visit's Progress: 10%    Note:     Barriers: language barrier, low literacy, noncompliance, and lack of knowledge how to navigate complex health care system  Strengths: motivated to attend appt  Patient expressed understanding of goal: Yes    Action steps to achieve this goal:  1. I will attend my follow-up ID clinic appointment on 8/7/25 at 9:05 am with Augustine Camacho MD   3. I will schedule a follow up appointment with my PCP if it is recommended to do so while I am at the clinic.  4. I will follow up with CCC regarding this goal at each outreach until it is completed.                                     Plan/Recommendations: The patient will continue working with Care Coordination to achieve goal(s) as above. CHW will continue outreaches at minimum every 30 days and will involve Lead CC as needed or if patient is ready to move to Maintenance. Lead CC will continue to monitor CHW outreaches and patient's progress to goal(s) every 6 weeks.     Plan of Care updated and sent to patient: No

## 2025-03-10 ENCOUNTER — PATIENT OUTREACH (OUTPATIENT)
Dept: CARE COORDINATION | Facility: CLINIC | Age: 30
End: 2025-03-10
Payer: COMMERCIAL

## 2025-03-10 DIAGNOSIS — I27.20 PULMONARY HYPERTENSION (H): Primary | ICD-10-CM

## 2025-03-10 DIAGNOSIS — R06.09 DOE (DYSPNEA ON EXERTION): ICD-10-CM

## 2025-03-10 RX ORDER — LIDOCAINE 40 MG/G
CREAM TOPICAL
OUTPATIENT
Start: 2025-03-10

## 2025-03-10 NOTE — PROGRESS NOTES
Clinic Care Coordination Contact  Community Health Worker Follow Up    Care Gaps:     Health Maintenance Due   Topic Date Due    ADVANCE CARE PLANNING  Never done    COPD ACTION PLAN  Never done    ZOSTER IMMUNIZATION (1 of 2) Never done    COVID-19 Vaccine (2 - Pfizer risk series) 02/14/2024    YEARLY PREVENTIVE VISIT  02/01/2025     Scheduled on 4/21/2025 for preventive care visit      Care Plan:   Care Plan: Cardiology       Problem: Pulmonary hypertension       Goal: Patient will attend his follow up cardiology clinic appointment in the next 12 months.       Start Date: 1/6/2025 Expected End Date: 12/31/2025    This Visit's Progress: 40% Recent Progress: 30%    Note:     Barriers: language barrier, low literacy, noncompliance, and lack of knowledge how to navigate complex health care system  Strengths: motivated to attend appt  Patient expressed understanding of goal: Yes    Action steps to achieve this goal:  1. I will attend my R[facility] procedure on 2/5/25 at 7:00 am arrival time. No showed.  2. I will schedule a follow up appointment with my cardiologist if it is recommended to do so while I am at the clinic.  3. I will follow up with CCC regarding this goal at each outreach until it is completed.     Note: CHW reached out to  and CHW will receive a call back.                            Care Plan: [facility] service           Care Plan: GI/Hepatology       Problem: Chronic hepatitis C without hepatic coma       Goal: Patient will attend his follow up GI/hepatology clinic appointment in the next 12 months.       Start Date: 3/6/2025 Expected End Date: 3/31/2026    This Visit's Progress: 20% Recent Progress: 10%    Note:     Barriers: language barrier, low literacy, noncompliance, and lack of knowledge how to navigate complex health care system  Strengths: motivated to attend appt  Patient expressed understanding of goal: Yes    Action steps to achieve this goal:  1. I will attend my follow-up appointment with  GI specialist as scheduled on 7/21/2025 at 1:45 PM with Nanette Sloan and lab at 1:00 pm. Updated.  2. I will follow up with CCC regarding this goal at each outreach until it is completed.        HEPATOLOGY - 3rd Floor  3A    Department Address: [address] [place] [id number]   Department Phone: [phone]                               Care Plan: ID clinic follow-up       Problem: follow-up       Goal: Patient will attend his follow-up ID clinic appointment in the next 12 months.       Start Date: 3/6/2025 Expected End Date: 3/31/2026    This Visit's Progress: 20% Recent Progress: 10%    Note:     Barriers: language barrier, low literacy, noncompliance, and lack of knowledge how to navigate complex health care system  Strengths: motivated to attend appt  Patient expressed understanding of goal: Yes    Action steps to achieve this goal:  1. I will attend my follow-up ID clinic appointment on 8/7/25 at 9:05 am with Augustine Camacho MD. Goal updated.   3. I will schedule a follow up appointment with my PCP if it is recommended to do so while I am at the clinic.  4. I will follow up with CCC regarding this goal at each outreach until it is completed.                             Intervention and Education during outreach:  -- Green card appt reminder. Reminded.  - Assist patient reschedule ECHO and procedure no showed on 2/5/25. CHW reached out to  and someone will call back for coordinating with now show ECHO and procedure appointments.   - follow up on armhs service status at [place]. Patient is receiving [facility] services [facility] and [facility] info has been updated in patient's care team.  -CHW arranged transportation for upcoming appointments.   -Patient was informed to call with questions or concerns.     CHW Next Outreach: In one month.

## 2025-03-11 ENCOUNTER — OFFICE VISIT (OUTPATIENT)
Dept: FAMILY MEDICINE | Facility: CLINIC | Age: 30
End: 2025-03-11
Payer: COMMERCIAL

## 2025-03-11 VITALS
RESPIRATION RATE: 16 BRPM | BODY MASS INDEX: 26.16 KG/M2 | WEIGHT: 157 LBS | SYSTOLIC BLOOD PRESSURE: 136 MMHG | OXYGEN SATURATION: 100 % | HEIGHT: 65 IN | DIASTOLIC BLOOD PRESSURE: 88 MMHG | HEART RATE: 81 BPM | TEMPERATURE: 98.9 F

## 2025-03-11 DIAGNOSIS — Z28.39 IMMUNIZATION DEFICIENCY: ICD-10-CM

## 2025-03-11 DIAGNOSIS — B20 HUMAN IMMUNODEFICIENCY VIRUS (HIV) DISEASE (H): ICD-10-CM

## 2025-03-11 DIAGNOSIS — B18.2 CHRONIC HEPATITIS C WITHOUT HEPATIC COMA (H): Primary | ICD-10-CM

## 2025-03-11 PROCEDURE — 99213 OFFICE O/P EST LOW 20 MIN: CPT | Mod: 25 | Performed by: FAMILY MEDICINE

## 2025-03-11 PROCEDURE — T1013 SIGN LANG/ORAL INTERPRETER: HCPCS

## 2025-03-11 PROCEDURE — 3075F SYST BP GE 130 - 139MM HG: CPT | Performed by: FAMILY MEDICINE

## 2025-03-11 PROCEDURE — 90713 POLIOVIRUS IPV SC/IM: CPT | Performed by: FAMILY MEDICINE

## 2025-03-11 PROCEDURE — 90471 IMMUNIZATION ADMIN: CPT | Performed by: FAMILY MEDICINE

## 2025-03-11 PROCEDURE — 3079F DIAST BP 80-89 MM HG: CPT | Performed by: FAMILY MEDICINE

## 2025-03-11 NOTE — PROGRESS NOTES
ASSESMENT AND PLAN:  Diagnoses and all orders for this visit:  Chronic hepatitis C without hepatic coma (H)  Reviewed most recent hepatology consultation, patient is completing his antiviral therapy for hepatitis C and will follow-up with hepatology as directed.  Human immunodeficiency virus (HIV) disease (H)  Continue current treatment and follow-up with infectious disease.  Immunization deficiency  Green Card/update imm.  Counseling done on immunization history and requirements with the patient.  Reviewed available immunization history and relevant lab records with patient and family.  Immunizations given as documented in the EHR and on form.  Acetaminophen as needed for post-immunization fever or myalgias.  [employer] form I-693 completed today with the patient.  Given to patient in a sealed labeled envelope and a copy is being scanned into the EHR.  Please see that form for further details.  Patient directed to follow-up in clinic for routine and preventative care.   -     POLIOVIRUS 6W-18Y (IPOL)      Reviewed the risks and benefits of the treatment plan with the patient and/or caregiver and we discussed indications for routine and emergent follow-up.        SUBJECTIVE: Patient with a complicated infectious disease history here for immunization update and green card exam.  He does not have any past history of serious immunization reactions or problems.  No recent fevers.  Patient reports that he is taking his antiviral medications as prescribed.    Past Medical History:   Diagnosis Date    Alcohol dependence (H)     Cannabis abuse     Current smoker     Hepatitis C     Human immunodeficiency virus (HIV) disease (H) 2019    upon arrival is on Dolutegravir/Lamivudine/Tenovir 50/300/300  one daily; CD4 788 (7/23)    Opioid use disorder, moderate, in sustained remission (H)     diagnosis upon [place] in [place] 1/2024    Other ascites     Pulmonary hypertension (H)     severely impaired  "right ventricular systolic function    Reaction to QuantiFERON-TB test (QFT) without active tuberculosis     class B1 upon arrival to [place]    Tattoos     right hand, left leg     Patient Active Problem List   Diagnosis    History of TB per overseas paperwork, negative quantiferon - saw [facility] and discharged    Chronic hepatitis C without hepatic coma (H)    Human immunodeficiency virus (HIV) disease (H)    Pulmonary hypertension (H)    Refugee health examination    Anemia, unspecified type    Thrombocytopenia    Alcohol dependence with unspecified alcohol-induced disorder (H)    History of depression     Current Outpatient Medications   Medication Sig Dispense Refill    ambrisentan (LETAIRIS) 10 MG tablet Take 1 tablet (10 mg) by mouth daily. 30 tablet 11    bictegravir-emtricitabine-tenofovir (BIKTARVY) -25 MG per tablet Take 1 tablet by mouth daily. 30 tablet 11    digoxin (LANOXIN) 250 MCG tablet Take 1 tablet (250 mcg) by mouth daily 30 tablet 11    furosemide (LASIX) 20 MG tablet Take 1 tablet (20 mg) by mouth daily 90 tablet 3    sildenafil (REVATIO) 20 MG tablet Take 1 tablet (20 mg) by mouth 3 times daily 90 tablet 11    glecaprevir-pibrentasvir (MAVYRET) 100-40 MG per tablet Take 3 tablets by mouth daily. (Patient not taking: Reported on 2/6/2025) 84 tablet 1    Treprostinil (TYVASO DPI) 64 MCG inhaler Inhale 1 Inhalation (64 mcg) into the lungs 4 times daily.       History   Smoking Status    Every Day    Types: Cigarettes, Vaping Device   Smokeless Tobacco    Current       OBJECTICE: /88 (BP Location: Left arm)   Pulse 81   Temp 98.9  F (37.2  C) (Oral)   Resp 16   Ht 1.651 m (5' 5\")   Wt 71.2 kg (157 lb)   SpO2 100%   BMI 26.13 kg/m       No results found for this or any previous visit (from the past 24 hours).     CV-regular rate and rhythm with no murmur   RESP-lungs clear to auscultation         Signed Electronically by: Minh Zamora MD    "

## 2025-03-27 ENCOUNTER — TELEPHONE (OUTPATIENT)
Dept: CARDIOLOGY | Facility: CLINIC | Age: 30
End: 2025-03-27
Payer: COMMERCIAL

## 2025-03-27 NOTE — TELEPHONE ENCOUNTER
3/27/2025  @ 8:58 AM -  Tyvaso DPI - maint 64 mcg - renew - expires:   4/10/25     -1139 am -  Called pt @ [phone] (home)  -[facility] - [phone] - [4-[place]] -  : Jenna ID: [id number] - unable to connect with patient # 2.   Patient to call:     [facility] (PH: [phone]) to arrange shipment of Tyvaso DPI 64 mcg (112/28).     -Sent text msg via [employer] allowing one-time reply requesting pt call [facility] to arrange shipment of Tyvaso DPI.       PA Initiation  Medication: Tyvaso DPI - maint 64 mcg - renew - expires:   4/10/25   Insurance Company: [employer] - Phone [phone] Fax [phone]:  MARK QUARLES / KEISHA  Pharmacy Filling the Rx:    Filling Pharmacy Phone:    Filling Pharmacy Fax:    Start Date: 3/27/2025  via [facility], key [id number], w/ [facility] dated : 4/9/24; Dx Code: I27.0 (OOP/   secondary to HIV. )     ----------------------------  Insurance: MARK QUARLES / KEISHA  BIN: [id number]  PCN: JEAN PIERRE  RX GRP#: [id number]  ID#: [id number]           Lisette SAHA CMA- Prior Auths  Cardiology/Pulmonary Hypertension

## 2025-03-27 NOTE — TELEPHONE ENCOUNTER
3/27/2025  @ 8:43 AM -  sildenafil 20 mg (90/30) - renew - expires:  4/9/25    PA Initiation  Medication: sildenafil 20 mg (90/30) - renew - expires:  4/9/25  Insurance Company: [employer] - Phone [phone] Fax [phone]:  MARK QUARLES / ABHAY  Pharmacy Filling the Rx: [facility] - [place], [place] - [address] [place] [address]  Filling Pharmacy Phone:    Filling Pharmacy Fax:    Start Date: 3/27/2025  [facility] [id number], w/ [facility] dated : 4/9/24; Dx Code: I27.0 (OOP/   secondary to HIV. )    ----------------------------  Insurance: MARK QUARLES / ABHAY  BIN: [id number]  PCN: JEAN PIERRE  RX GRP#: [id number]  ID#: [id number]          Lisette SAHA CMA- Prior Auths  Cardiology/Pulmonary Hypertension

## 2025-04-08 ENCOUNTER — HOSPITAL ENCOUNTER (OUTPATIENT)
Dept: CARDIOLOGY | Facility: HOSPITAL | Age: 30
Discharge: HOME | End: 2025-04-08
Attending: PHYSICIAN ASSISTANT
Payer: COMMERCIAL

## 2025-04-08 DIAGNOSIS — I27.20 PULMONARY HYPERTENSION (H): ICD-10-CM

## 2025-04-08 DIAGNOSIS — R06.09 DOE (DYSPNEA ON EXERTION): ICD-10-CM

## 2025-04-08 LAB — LVEF ECHO: NORMAL

## 2025-04-08 PROCEDURE — 93306 TTE W/DOPPLER COMPLETE: CPT

## 2025-04-08 PROCEDURE — 93306 TTE W/DOPPLER COMPLETE: CPT | Mod: 26 | Performed by: INTERNAL MEDICINE

## 2025-04-10 ENCOUNTER — PATIENT OUTREACH (OUTPATIENT)
Dept: CARE COORDINATION | Facility: CLINIC | Age: 30
End: 2025-04-10
Payer: COMMERCIAL

## 2025-04-10 DIAGNOSIS — I27.20 PULMONARY HYPERTENSION (H): ICD-10-CM

## 2025-04-10 DIAGNOSIS — I27.20 PULMONARY HTN (H): ICD-10-CM

## 2025-04-10 DIAGNOSIS — R06.09 DOE (DYSPNEA ON EXERTION): ICD-10-CM

## 2025-04-10 RX ORDER — SILDENAFIL CITRATE 20 MG/1
20 TABLET ORAL 3 TIMES DAILY
Qty: 90 TABLET | Refills: 5 | Status: SHIPPED | OUTPATIENT
Start: 2025-04-10

## 2025-04-10 RX ORDER — FUROSEMIDE 20 MG/1
20 TABLET ORAL DAILY
Qty: 90 TABLET | Refills: 1 | Status: SHIPPED | OUTPATIENT
Start: 2025-04-10

## 2025-04-10 NOTE — TELEPHONE ENCOUNTER
Medication Refill double check:    Last office visit was on 9/26/24 with Sarah Jernigan PA-C.    Follow up was recommended for 3 months. (OVERDUE)    Any additional encounters with changes to requested med? no    Authorizing provider is: Dr. Andreas Lopez was approved.     Additional orders/notes:       Yaritza Bull RN on 4/10/2025 at 1:53 PM

## 2025-04-10 NOTE — PROGRESS NOTES
Clinic Care Coordination Contact  Community Health Worker Follow Up    Care Gaps:   Health Maintenance Due   Topic Date Due    ADVANCE CARE PLANNING  Never done    COPD ACTION PLAN  Never done    ZOSTER IMMUNIZATION (1 of 2) Never done    COVID-19 Vaccine (2 - Pfizer risk series) 02/14/2024    YEARLY PREVENTIVE VISIT  02/01/2025     Scheduled on 4/21/2025 for preventive care visit      Care Plan:   Care Plan: Cardiology       Problem: Pulmonary hypertension       Goal: Patient will attend his follow up cardiology clinic appointment in the next 12 months.       Start Date: 1/6/2025 Expected End Date: 12/31/2025    This Visit's Progress: 50% Recent Progress: 40%    Note:     Barriers: language barrier, low literacy, noncompliance, and lack of knowledge how to navigate complex health care system  Strengths: motivated to attend appt  Patient expressed understanding of goal: Yes    Action steps to achieve this goal:  1. I will attend my procedure on 4/29/25 at 8:00 am arrival time.   2. I will schedule a follow up appointment with my cardiologist if it is recommended to do so while I am at the clinic.  3. I will answer the phone when  calls to pick me up.                            Care Plan: [facility] service           Care Plan: GI/Hepatology       Problem: Chronic hepatitis C without hepatic coma       Goal: Patient will attend his follow up GI/hepatology clinic appointment in the next 12 months.       Start Date: 3/6/2025 Expected End Date: 3/31/2026    This Visit's Progress: 30% Recent Progress: 20%    Note:     Barriers: language barrier, low literacy, noncompliance, and lack of knowledge how to navigate complex health care system  Strengths: motivated to attend appt  Patient expressed understanding of goal: Yes    Action steps to achieve this goal:  1. I will attend my follow-up appointment with GI specialist as scheduled on 7/21/2025 at 1:45 PM with Nanette Sloan and lab at 1:00 pm.  Updated.  2. I will follow up with CCC regarding this goal at each outreach until it is completed.        HEPATOLOGY - 3rd Floor  3A    Department Address: [address] [facility] [address]   Department Phone: [phone]                               Care Plan: ID clinic follow-up       Problem: follow-up       Goal: Patient will attend his follow-up ID clinic appointment in the next 12 months.       Start Date: 3/6/2025 Expected End Date: 3/31/2026    This Visit's Progress: 30% Recent Progress: 20%    Note:     Barriers: language barrier, low literacy, noncompliance, and lack of knowledge how to navigate complex health care system  Strengths: motivated to attend appt  Patient expressed understanding of goal: Yes    Action steps to achieve this goal:  1. I will attend my follow-up ID clinic appointment on 8/7/25 at 9:05 am with Augustine Camacho MD. Goal updated.   2. I will attend my physical appointment with PCP as scheduled on 4/21/2025 at 10:10 AM.  3. I will follow up with CCC regarding this goal at each outreach until it is completed.                           Intervention and Education during outreach:  -CHW informed patient of upcoming appointments and transportation arranged with [employer] as appropriated.  -Per patient, he starting working but it is not regular.  -CHW routed this outreach encounter to care team inquiring if pre-op needed.   -Patient was informed to call with questions or concerns.     CHW Next Outreach: In one month.

## 2025-04-17 ENCOUNTER — PATIENT OUTREACH (OUTPATIENT)
Dept: CARE COORDINATION | Facility: CLINIC | Age: 30
End: 2025-04-17
Payer: COMMERCIAL

## 2025-04-17 NOTE — PROGRESS NOTES
Clinic Care Coordination Contact  Care Coordination Clinician Chart Review    Situation: Patient chart reviewed by Care Coordinator.       Background: Care Coordination Program started: 1/24/2024. Initial assessment completed and patient-centered care plan(s) were developed with participation from patient. Lead CC handed patient off to CHW for continued outreaches.       Assessment: Per chart review, patient outreach completed by CC CHW on 4/10/25.  Patient is actively working to accomplish goal(s). Patient's goal(s) appropriate and relevant at this time. Patient is not due for updated Plan of Care.  Assessments will be completed annually or as needed/with change of patient status.    CCRN spoke with patient today and reminded him of upcoming appts on 4/21 and 4/29. Patient states he plans to attend both appts.      Care Plan: Cardiology       Problem: Pulmonary hypertension       Goal: Patient will attend his follow up cardiology clinic appointment in the next 12 months.       Start Date: 1/6/2025 Expected End Date: 12/31/2025    Recent Progress: 50%    Note:     Barriers: language barrier, low literacy, noncompliance, and lack of knowledge how to navigate complex health care system  Strengths: motivated to attend appt  Patient expressed understanding of goal: Yes    Action steps to achieve this goal:  1. I will attend my procedure on 4/29/25 at 8:00 am arrival time.   2. I will schedule a follow up appointment with my cardiologist if it is recommended to do so while I am at the clinic.  3. I will answer the phone when  calls to pick me up.                            Care Plan: GI/Hepatology       Problem: Chronic hepatitis C without hepatic coma       Goal: Patient will attend his follow up GI/hepatology clinic appointment in the next 12 months.       Start Date: 3/6/2025 Expected End Date: 3/31/2026    Recent Progress: 30%    Note:     Barriers: language barrier, low literacy, noncompliance,  and lack of knowledge how to navigate complex health care system  Strengths: motivated to attend appt  Patient expressed understanding of goal: Yes    Action steps to achieve this goal:  1. I will attend my follow-up appointment with GI specialist as scheduled on 7/21/2025 at 1:45 PM with Nanette Sloan and lab at 1:00 pm. Updated.  2. I will follow up with CCC regarding this goal at each outreach until it is completed.        HEPATOLOGY - 3rd Floor  3A    Department Address: [address] [place] [id number]   Department Phone: [phone]                               Care Plan: ID clinic follow-up       Problem: follow-up       Goal: Patient will attend his follow-up ID clinic appointment in the next 12 months.       Start Date: 3/6/2025 Expected End Date: 3/31/2026    Recent Progress: 30%    Note:     Barriers: language barrier, low literacy, noncompliance, and lack of knowledge how to navigate complex health care system  Strengths: motivated to attend appt  Patient expressed understanding of goal: Yes    Action steps to achieve this goal:  1. I will attend my follow-up ID clinic appointment on 8/7/25 at 9:05 am with Augustine Camacho MD.   2. I will attend my physical appointment with PCP as scheduled on 4/21/2025 at 10:10 AM.  3. I will follow up with CCC regarding this goal at each outreach until it is completed.                                  Plan/Recommendations: The patient will continue working with Care Coordination to achieve goal(s) as above. CHW will continue outreaches at minimum every 30 days and will involve Lead CC as needed or if patient is ready to move to Maintenance. Lead CC will continue to monitor CHW outreaches and patient's progress to goal(s) every 6 weeks.     Plan of Care updated and sent to patient: No

## 2025-04-21 ENCOUNTER — OFFICE VISIT (OUTPATIENT)
Dept: FAMILY MEDICINE | Facility: CLINIC | Age: 30
End: 2025-04-21
Payer: COMMERCIAL

## 2025-04-21 VITALS
DIASTOLIC BLOOD PRESSURE: 92 MMHG | OXYGEN SATURATION: 100 % | WEIGHT: 148.2 LBS | HEART RATE: 97 BPM | BODY MASS INDEX: 24.69 KG/M2 | HEIGHT: 65 IN | TEMPERATURE: 98.8 F | RESPIRATION RATE: 16 BRPM | SYSTOLIC BLOOD PRESSURE: 122 MMHG

## 2025-04-21 DIAGNOSIS — B20 HUMAN IMMUNODEFICIENCY VIRUS (HIV) DISEASE (H): ICD-10-CM

## 2025-04-21 DIAGNOSIS — I50.812 CHRONIC RIGHT-SIDED HEART FAILURE (H): ICD-10-CM

## 2025-04-21 DIAGNOSIS — F11.21 OPIOID DEPENDENCE IN REMISSION (H): ICD-10-CM

## 2025-04-21 DIAGNOSIS — Z71.89 ADVANCE CARE PLANNING: ICD-10-CM

## 2025-04-21 DIAGNOSIS — Z23 NEED FOR VACCINATION: ICD-10-CM

## 2025-04-21 DIAGNOSIS — B18.2 CHRONIC HEPATITIS C WITHOUT HEPATIC COMA (H): ICD-10-CM

## 2025-04-21 DIAGNOSIS — I27.20 PULMONARY HYPERTENSION (H): ICD-10-CM

## 2025-04-21 DIAGNOSIS — Z13.9 ENCOUNTER FOR SCREENING INVOLVING SOCIAL DETERMINANTS OF HEALTH (SDOH): ICD-10-CM

## 2025-04-21 DIAGNOSIS — Z00.00 ROUTINE GENERAL MEDICAL EXAMINATION AT A HEALTH CARE FACILITY: Primary | ICD-10-CM

## 2025-04-21 DIAGNOSIS — F17.200 NICOTINE DEPENDENCE, UNCOMPLICATED, UNSPECIFIED NICOTINE PRODUCT TYPE: ICD-10-CM

## 2025-04-21 DIAGNOSIS — R03.0 ELEVATED BLOOD PRESSURE READING WITHOUT DIAGNOSIS OF HYPERTENSION: ICD-10-CM

## 2025-04-21 DIAGNOSIS — F10.29 ALCOHOL DEPENDENCE WITH UNSPECIFIED ALCOHOL-INDUCED DISORDER (H): ICD-10-CM

## 2025-04-21 PROCEDURE — 3074F SYST BP LT 130 MM HG: CPT | Performed by: FAMILY MEDICINE

## 2025-04-21 PROCEDURE — 99214 OFFICE O/P EST MOD 30 MIN: CPT | Mod: 25 | Performed by: FAMILY MEDICINE

## 2025-04-21 PROCEDURE — G2211 COMPLEX E/M VISIT ADD ON: HCPCS | Performed by: FAMILY MEDICINE

## 2025-04-21 PROCEDURE — 3080F DIAST BP >= 90 MM HG: CPT | Performed by: FAMILY MEDICINE

## 2025-04-21 PROCEDURE — 99395 PREV VISIT EST AGE 18-39: CPT | Performed by: FAMILY MEDICINE

## 2025-04-21 RX ORDER — VARENICLINE TARTRATE 0.5 MG/1
TABLET, FILM COATED ORAL
Qty: 95 TABLET | Refills: 0 | Status: SHIPPED | OUTPATIENT
Start: 2025-04-21

## 2025-04-21 RX ORDER — VARENICLINE TARTRATE 1 MG/1
1 TABLET, FILM COATED ORAL 2 TIMES DAILY
Qty: 180 TABLET | Refills: 0 | Status: SHIPPED | OUTPATIENT
Start: 2025-05-21

## 2025-04-21 RX ORDER — NICOTINE 21 MG/24HR
1 PATCH, TRANSDERMAL 24 HOURS TRANSDERMAL EVERY 24 HOURS
Qty: 42 PATCH | Refills: 0 | Status: SHIPPED | OUTPATIENT
Start: 2025-04-21 | End: 2025-06-02

## 2025-04-21 SDOH — HEALTH STABILITY: PHYSICAL HEALTH: ON AVERAGE, HOW MANY MINUTES DO YOU ENGAGE IN EXERCISE AT THIS LEVEL?: 20 MIN

## 2025-04-21 SDOH — HEALTH STABILITY: PHYSICAL HEALTH: ON AVERAGE, HOW MANY DAYS PER WEEK DO YOU ENGAGE IN MODERATE TO STRENUOUS EXERCISE (LIKE A BRISK WALK)?: 1 DAY

## 2025-04-21 ASSESSMENT — SOCIAL DETERMINANTS OF HEALTH (SDOH): HOW OFTEN DO YOU GET TOGETHER WITH FRIENDS OR RELATIVES?: NEVER

## 2025-04-21 NOTE — PROGRESS NOTES
.Preventive Care Visit  [facility] FELICIANO Adams MD, Family Medicine  Apr 21, 2025      History of pulmonary hypertension with RV dysfunction class I likely related to HIV, HIV on ART, HCV, alcohol use disorder, possible cirrhosis, opioid use disorder in remission, marijuana use, depression, and anxiety.      Assessment & Plan     Routine general medical examination at a health care facility  Labs, screenings, and vaccines as ordered and counseling as detailed below.    Encounter for screening involving social determinants of health (SDoH)  He does not wish to apply for disability, connected to care coordination already and states he is receiving benefits.    Opioid dependence in remission (H)  Continues to abstain from opioid use.    Chronic right-sided heart failure (H)  Pulmonary hypertension (H)  Symptoms well controlled on current medications. No signs of acute exacerbation. Has follow up with cardiology scheduled.    Chronic hepatitis C without hepatic coma (H)  He completed treatment with mavyret, and has follow up with GI scheduled.    Alcohol dependence with unspecified alcohol-induced disorder (H)  Continues to drink alcohol - discussed importance of stopping for his health.    Human immunodeficiency virus (HIV) disease (H)  He is on biktarvy and follows with ID - last viral load was suppressed. Has fo withllow up scheduled.    Elevated blood pressure reading without diagnosis of hypertension  Previosly has had normal Bps. Did smoke a cigarette before coming into visit. Return for RN visit for BP check. If BP remains >= 140/90, will contact cardiology to decide on which antihypertensive to start.    Nicotine dependence, uncomplicated, unspecified nicotine product type  He is interested in quitting. Would like to try chantix with flexible quit, and patch and lozenge. Counseled on use today.  - nicotine (NICODERM CQ) 14 MG/24HR 24 hr patch; Place 1 patch over 24 hours  onto the skin every 24 hours.  - nicotine (NICODERM CQ) 7 MG/24HR 24 hr patch; Place 1 patch over 24 hours onto the skin every 24 hours for 14 days.  - varenicline (CHANTIX) 0.5 MG tablet; Take 0.5 mg (1 tablet) once a day for 3 days, THEN 0.5 mg (1 tablet) twice daily for 4 days, THEN 1.0 mg (2 tablets) twice daily  - varenicline (CHANTIX) 1 MG tablet; Take 1 tablet (1 mg) by mouth 2 times daily.  - nicotine (NICORETTE) 4 MG lozenge; How to take it: When the urge to smoke occurs, suck (do not chew) on 1 lozenge to release nicotine. Do not eat or drink while the lozenge is in your mouth. Follow this schedule: Weeks 1 to 6: One lozenge every 1 to 2 hours. Use at least 9 lozenges a day, but no more than 20. Weeks 7 to 9: One lozenge every 2 to 4 hours. Weeks 10 to 12: One lozenge every 4 to 8 hours    Need for vaccination  Declines vaccines today    Advance care planning  Reviewed - he does not have someone who can help him in English, so will schedule visit to follow up on this.            Follow up in 3mos for chronic medical problems, smoking cessation, and advance care planning.          The longitudinal plan of care for the diagnosis(es)/condition(s) as documented were addressed during this visit. Due to the added complexity in care, I will continue to support July in the subsequent management and with ongoing continuity of care.    Subjective   July is a 29 year old, presenting for the following:  Physical and Recheck Medication        4/21/2025    10:53 AM   Additional Questions   Roomed by Gaurav EVERETT   Accompanied by Self          HPI  Breathing is good  He has not noticed any weight changes - thinks he has gained a little weight    Blood pressure is high today - had a cigarette right before coming in    Smoking - has tried to quit smoking in the past - was able to quit smoking for 1-2mos, then started smoking again - thinks it was May 2024. Quit smoking on own. Not interested in quitting smoking with meds -  thinks he can do it on his own.    Not interested in quitting drinking - thinks as soon as he starts working again, he will quit drinking  Lives with mom  He is getting benefits    Has a home nurse who helps with medications           Advance Care Planning    Discussed advance care planning with patient; informed AVS has link to [employer].        4/21/2025   General Health   How would you rate your overall physical health? Good   Feel stress (tense, anxious, or unable to sleep) To some extent   (!) STRESS CONCERN      4/21/2025   Nutrition   Three or more servings of calcium each day? Yes   Diet: Regular (no restrictions)   How many servings of fruit and vegetables per day? (!) 2-3   How many sweetened beverages each day? 0-1         4/21/2025   Exercise   Days per week of moderate/strenous exercise 1 day   Average minutes spent exercising at this level 20 min   (!) EXERCISE CONCERN      4/21/2025   Social Factors   Frequency of gathering with friends or relatives Never   Worry food won't last until get money to buy more Yes   Food not last or not have enough money for food? Yes   Do you have housing? (Housing is defined as stable permanent housing and does not include staying ouside in a car, in a tent, in an abandoned building, in an overnight shelter, or couch-surfing.) Yes   Are you worried about losing your housing? Yes   Lack of transportation? Yes   Unable to get utilities (heat,electricity)? Yes   Want help with housing or utility concern? (!) YES   (!) FOOD SECURITY CONCERN PRESENT (!) TRANSPORTATION CONCERN PRESENT(!) HOUSING CONCERN PRESENT(!) FINANCIAL RESOURCE STRAIN CONCERN(!) SOCIAL CONNECTIONS CONCERN      4/21/2025   Dental   Dentist two times every year? (!) NO           Today's PHQ-2 Score:       1/6/2025     1:52 PM   PHQ-2 ( 1999 [employer])   Q1: Little interest or pleasure in doing things 0   Q2: Feeling down, depressed or hopeless 0   PHQ-2 Score 0         4/21/2025   Substance Use   If I  could quit smoking, I would Somewhat agree   I want to quit somking, worry about health affects Somewhat agree   Willing to make a plan to quit smoking Somewhat agree   Willing to cut down before quitting Somewhat agree   Alcohol more than 3/day or more than 7/wk Yes   How often do you have a drink containing alcohol 2 to 3 times a week   How many alcohol drinks on typical day 1 or 2   How often do you have 5+ drinks at one occasion Weekly   Audit 2/3 Score 3   How often not able to stop drinking once started Less than monthly   How often failed to do what normally expected Less than monthly   How often needed first drink in am after a heavy drinking session Monthly   How often feeling of guilt or remorse after drinking Monthly   How often unable to remember what happened the night before Monthly   Have you or someone else been injured because of your drinking No   Has anyone been concerned or suggested you cut down on drinking Yes, during the last year   TOTAL SCORE - AUDIT 18   Do you use any other substances recreationally? No     Social History     Tobacco Use    Smoking status: Every Day     Types: Cigarettes, Vaping Device     Passive exposure: Current    Smokeless tobacco: Current    Tobacco comments:     No chewing anymore, only vaping   Vaping Use    Vaping status: Never Used   Substance Use Topics    Alcohol use: Yes     Comment: drinks 1 glass daily, white alcohol, drinks after he eats a meal    Drug use: Not Currently           4/21/2025   STI Screening   New sexual partner(s) since last STI/HIV test? No         4/21/2025   Contraception/Family Planning   Questions about contraception or family planning No        Reviewed and updated as needed this visit by Provider     Meds                      Review of Systems  Constitutional, HEENT, cardiovascular, pulmonary, gi and gu systems are negative, except as otherwise noted.     Objective    Exam  BP (!) 122/92   Pulse 97   Temp 98.8  F (37.1  C) (Oral)   " Resp 16   Ht 1.66 m (5' 5.35\")   Wt 67.2 kg (148 lb 3.2 oz)   SpO2 100%   BMI 24.40 kg/m     Estimated body mass index is 24.4 kg/m  as calculated from the following:    Height as of this encounter: 1.66 m (5' 5.35\").    Weight as of this encounter: 67.2 kg (148 lb 3.2 oz).    BP Readings from Last 3 Encounters:   04/21/25 (!) 122/92   03/11/25 136/88   02/06/25 120/64     Wt Readings from Last 3 Encounters:   04/21/25 67.2 kg (148 lb 3.2 oz)   03/11/25 71.2 kg (157 lb)   01/06/25 72.2 kg (159 lb 2 oz)         Physical Exam  GENERAL: alert and no distress  EYES: Eyes grossly normal to inspection, PERRL and conjunctivae and sclerae normal  HENT: ear canals and TM's normal, nose and mouth without ulcers or lesions  NECK: no adenopathy, no asymmetry, masses, or scars  RESP: lungs clear to auscultation - no rales, rhonchi or wheezes  CV: regular rate and rhythm, normal S1 S2, no S3 or S4, no murmur, click or rub, no peripheral edema  ABDOMEN: soft, nontender, no hepatosplenomegaly, no masses and bowel sounds normal  MS: no gross musculoskeletal defects noted, no edema  SKIN: no suspicious lesions or rashes  NEURO: Normal strength and tone, mentation intact and speech normal  PSYCH: mentation appears normal, affect normal/bright        Signed Electronically by: Rosalie Adams MD    "

## 2025-04-21 NOTE — PATIENT INSTRUCTIONS
Patient Education   You have been provided the Preventive Care Advice document.    Additional copies can be found here: [email or web address]  Preventive Care Advice   This is general advice given by our system to help you stay healthy. However, your care team may have specific advice just for you. Please talk to your care team about your preventive care needs.  Nutrition  Eat 5 or more servings of fruits and vegetables each day.  Try wheat bread, brown rice and whole grain pasta (instead of white bread, rice, and pasta).  Get enough calcium and vitamin D. Check the label on foods and aim for 100% of the RDA (recommended daily allowance).  Lifestyle  Exercise at least 150 minutes each week  (30 minutes a day, 5 days a week).  Do muscle strengthening activities 2 days a week. These help control your weight and prevent disease.  No smoking.  Wear sunscreen to prevent skin cancer.  Have a dental exam and cleaning every 6 months.  Yearly exams  See your health care team every year to talk about:  Any changes in your health.  Any medicines your care team has prescribed.  Preventive care, family planning, and ways to prevent chronic diseases.  Shots (vaccines)   HPV shots (up to age 26), if you've never had them before.  Hepatitis B shots (up to age 59), if you've never had them before.  COVID-19 shot: Get this shot when it's due.  Flu shot: Get a flu shot every year.  Tetanus shot: Get a tetanus shot every 10 years.  Pneumococcal, hepatitis A, and RSV shots: Ask your care team if you need these based on your risk.  Shingles shot (for age 50 and up)  General health tests  Diabetes screening:  Starting at age 35, Get screened for diabetes at least every 3 years.  If you are younger than age 35, ask your care team if you should be screened for diabetes.  Cholesterol test: At age 39, start having a cholesterol test every 5 years, or more often if advised.  Bone density scan (DEXA): At age 50, ask your care team if you  should have this scan for osteoporosis (brittle bones).  Hepatitis C: Get tested at least once in your life.  STIs (sexually transmitted infections)  Before age 24: Ask your care team if you should be screened for STIs.  After age 24: Get screened for STIs if you're at risk. You are at risk for STIs (including HIV) if:  You are sexually active with more than one person.  You don't use condoms every time.  You or a partner was diagnosed with a sexually transmitted infection.  If you are at risk for HIV, ask about PrEP medicine to prevent HIV.  Get tested for HIV at least once in your life, whether you are at risk for HIV or not.  Cancer screening tests  Cervical cancer screening: If you have a cervix, begin getting regular cervical cancer screening tests starting at age 21.  Breast cancer scan (mammogram): If you've ever had breasts, begin having regular mammograms starting at age 40. This is a scan to check for breast cancer.  Colon cancer screening: It is important to start screening for colon cancer at age 45.  Have a colonoscopy test every 10 years (or more often if you're at risk) Or, ask your provider about stool tests like a FIT test every year or Cologuard test every 3 years.  To learn more about your testing options, visit:   .  For help making a decision, visit:   https://bit.ly/ex03669.  Prostate cancer screening test: If you have a prostate, ask your care team if a prostate cancer screening test (PSA) at age 55 is right for you.  Lung cancer screening: If you are a current or former smoker ages 50 to 80, ask your care team if ongoing lung cancer screenings are right for you.  For informational purposes only. Not to replace the advice of your health care provider. Copyright   2023 [employer]. All rights reserved. Clinically reviewed by the [facility] Program. [employer] [id number] - REV 01/24.  Relationships for Good Health  Relationships are important for our health and  happiness. Social isolation, loneliness and lack of support are bad for your health. Studies show that loneliness can harm health and limit your life span as much as high blood pressure and smoking.   Take some time to reflect on your relationships. Then answer these questions:  Are there people in your life that cause you stress or drain your energy? What can you do to set limits?  ________________________________________________________________________________________________________________________________________________________________________________________________________________________________________________________________________________________________________________________________________________  Who do you enjoy spending time with? Who can you go to for support?  ________________________________________________________________________________________________________________________________________________________________________________________________________________________________________________________________________________________________________________________________________________  What can you do to improve your relationships with others?  __________________________________________________________________________________________________________________________________________________________________________________________________________________  ______________________________________________________________________________________________________________________________  What do you like most about your relationships with others?  ________________________________________________________________________________________________________________________________________________________________________________________________________________________________________________________________________________________________________________________________________________  My goal:  ______________________________________________________________________  I will: ______________________________________________________________________________________________________________________________________________________________________________________________    For informational purposes only. Not to replace the advice of your health care provider. Copyright   2018 [facility]. All rights reserved. Clinically reviewed by [facility]  Team. [employer] [id number] - Rev 06/24.  Learning About Stress  What is stress?     Stress is your body's response to a hard situation. Your body can have a physical, emotional, or mental response. Stress is a fact of life for most people, and it affects everyone differently. What causes stress for you may not be stressful for someone else.  A lot of things can cause stress. You may feel stress when you go on a job interview, take a test, or run a race. This kind of short-term stress is normal and even useful. It can help you if you need to work hard or react quickly. For example, stress can help you finish an important job on time.  Long-term stress is caused by ongoing stressful situations or events. Examples of long-term stress include long-term health problems, ongoing problems at work, or conflicts in your family. Long-term stress can harm your health.  How does stress affect your health?  When you are stressed, your body responds as though you are in danger. It makes hormones that speed up your heart, make you breathe faster, and give you a burst of energy. This is called the fight-or-flight stress response. If the stress is over quickly, your body goes back to normal and no harm is done.  But if stress happens too often or lasts too long, it can have bad effects. Long-term stress can make you more likely to get sick, and it can make symptoms of some diseases worse. If you tense up when you are stressed, you may develop neck, shoulder, or low back  pain. Stress is linked to high blood pressure and heart disease.  Stress also harms your emotional health. It can make you alonzo, tense, or depressed. Your relationships may suffer, and you may not do well at work or school.  What can you do to manage stress?  You can try these things to help manage stress:   Do something active. Exercise or activity can help reduce stress. Walking is a great way to get started. Even everyday activities such as housecleaning or yard work can help.  Try yoga or mc chi. These techniques combine exercise and meditation. You may need some training at first to learn them.  Do something you enjoy. For example, listen to music or go to a movie. Practice your hobby or do volunteer work.  Meditate. This can help you relax, because you are not worrying about what happened before or what may happen in the future.  Do guided imagery. Imagine yourself in any setting that helps you feel calm. You can use online videos, books, or a teacher to guide you.  Do breathing exercises. For example:  From a standing position, bend forward from the waist with your knees slightly bent. Let your arms dangle close to the floor.  Breathe in slowly and deeply as you return to a standing position. Roll up slowly and lift your head last.  Hold your breath for just a few seconds in the standing position.  Breathe out slowly and bend forward from the waist.  Let your feelings out. Talk, laugh, cry, and express anger when you need to. Talking with supportive friends or family, a counselor, or a antwon leader about your feelings is a healthy way to relieve stress. Avoid discussing your feelings with people who make you feel worse.  Write. It may help to write about things that are bothering you. This helps you find out how much stress you feel and what is causing it. When you know this, you can find better ways to cope.  What can you do to prevent stress?  You might try some of these things to help prevent  "stress:  Manage your time. This helps you find time to do the things you want and need to do.  Get enough sleep. Your body recovers from the stresses of the day while you are sleeping.  Get support. Your family, friends, and community can make a difference in how you experience stress.  Limit your news feed. Avoid or limit time on social media or news that may make you feel stressed.  Do something active. Exercise or activity can help reduce stress. Walking is a great way to get started.  Where can you learn more?  Go to https://www.[employer].net/patiented  Enter N032 in the search box to learn more about \"Learning About Stress.\"  Current as of: October 24, 2024  Content Version: 14.4 2024-2025 [employer].   Care instructions adapted under license by your healthcare professional. If you have questions about a medical condition or this instruction, always ask your healthcare professional. [employer] disclaims any warranty or liability for your use of this information.    9 Ways to Cut Back on Drinking  Maybe you've found yourself drinking more alcohol than you'd prefer. If you want to cut back, here are some ideas to try.    Think before you drink.  Do you really want a drink, or is it just a habit? If you're used to having a drink at a certain time, try doing something else then.     Look for substitutes.  Find some no-alcohol drinks that you enjoy, like flavored seltzer water, tea with honey, or tonic with a slice of lime. Or try alcohol-free beer or \"virgin\" cocktails (without the alcohol).     Drink more water.  Use water to quench your thirst. Drink a glass of water before you have any alcohol. Have another glass along with every drink or between drinks.     Shrink your drink.  For example, have a bottle of beer instead of a pint. Use a smaller glass for wine. Choose drinks with lower alcohol content (ABV%). Or use less liquor and more mixer in cocktails.     Slow down.  It's easy to " "drink quickly and without thinking about it. Pay attention, and make each drink last longer.     Do the math.  Total up how much you spend on alcohol each month. How much is that a year? If you cut back, what could you do with the money you save?     Take a break.  Choose a day or two each week when you won't drink at all. Notice how you feel on those days, physically and emotionally. How did you sleep? Do you feel better? Over time, add more break days.     Count calories.  Would you like to lose some weight? For some people that's a good motivator for cutting back. Figure out how many calories are in each drink. How many does that add up to in a day? In a week? In a month?     Practice saying no.  Be ready when someone offers you a drink. Try: \"Thanks, I've had enough.\" Or \"Thanks, but I'm cutting back.\" Or \"No, thanks. I feel better when I drink less.\"   Current as of: August 20, 2024  Content Version: 14.4    8961-3364 [employer].   Care instructions adapted under license by your healthcare professional. If you have questions about a medical condition or this instruction, always ask your healthcare professional. [employer] disclaims any warranty or liability for your use of this information.         Nicotine Transdermal System   Habitrol, Nicoderm C-Q    Uses  For quitting smoking.    Instructions  DO NOT take this medicine by mouth.    Avoid placing the patch near the breast.    Remove the patch after 24 hours.    Keep the medicine at room temperature. Avoid heat and direct light.    This patch should not be cut.    Wash your hands before and after handling this medicine.    Remove old patch before applying new one. Change the location of the new patch.    If you have vivid dreams or trouble sleeping, you may remove the patch before going to sleep.    Ask your doctor or pharmacist about locations on your body where this patch can be used.    Remove the plastic liner that protects the " sticky side of the patch before applying to the skin.    Be sure the area of skin is clean and dry before putting on a new patch.    Apply the patch to a clean, dry, hairless area.    Press the patch firmly for a few seconds to make sure it stays in place.    After removing the patch, fold it together and discard it out of reach of children and pets.    Please ask your doctor or pharmacist how you can safely dispose of used patches.    If the skin under the patch becomes irritated, remove the patch. Do not apply a new patch to the area until the skin feels better.    To avoid irritating your skin, use a different location for a new patch.    Apply the patch only to normal looking skin. Avoid areas of the skin that are red, have scrapes, or damaged.    If the patch falls off, apply a new a patch on a different location of the body.    Please tell your doctor and pharmacist about all the medicines you take. Include both prescription and over-the-counter medicines. Also tell them about any vitamins, herbal medicines, or anything else you take for your health.    If you need to stop this medicine, your doctor may wish to gradually reduce the dosage before stopping.    Do not use more than 1 patch at any one time.    Cautions  Tell your doctor and pharmacist if you ever had an allergic reaction to a medicine. Symptoms of an allergic reaction can include trouble breathing, skin rash, itching, swelling, or severe dizziness.    Do not use the medication any more than instructed.    Avoid smoking while on this medicine. Smoking may increase your risk for stroke, heart attack, blood clots, high blood pressure, and other diseases of the heart and blood vessels.    Tell the doctor or pharmacist if you are pregnant, planning to be pregnant, or breastfeeding.    Ask your pharmacist if this medicine can interact with any of your other medicines. Be sure to tell them about all the medicines you take.    Please tell all your doctors  and dentists that you are on this medicine before they provide care.    Side Effects  The following is a list of some common side effects from this medicine. Please speak with your doctor about what you should do if you experience these or other side effects.    skin irritation where medicine is applied    If you have any of the following side effects, you may be getting too much medicine. Please contact your doctor to let them know about these side effects.    diarrhea  dizziness  nausea  rapid heartbeat  vomiting    A few people may have an allergic reaction to this medicine. Symptoms can include difficulty breathing, skin rash, itching, swelling, or severe dizziness. If you notice any of these symptoms, seek medical help quickly.    Extra  Please speak with your doctor, nurse, or pharmacist if you have any questions about this medicine.      [email or web address]  IMPORTANT NOTE: This document tells you briefly how to take your medicine, but it does not tell you all there is to know about it. Your doctor or pharmacist may give you other documents about your medicine. Please talk to them if you have any questions. Always follow their advice. There is a more complete description of this medicine available in English. Scan this code on your smartphone or tablet or use the web address below. You can also ask your pharmacist for a printout. If you have any questions, please ask your pharmacist.   2021 [employer].      1946-7693 [employer]. All rights reserved. This information is not intended as a substitute for professional medical care. Always follow your healthcare professional's instructions.

## 2025-04-25 ENCOUNTER — APPOINTMENT (OUTPATIENT)
Dept: INTERPRETER SERVICES | Facility: CLINIC | Age: 30
End: 2025-04-25
Payer: COMMERCIAL

## 2025-04-28 ENCOUNTER — PATIENT OUTREACH (OUTPATIENT)
Dept: CARE COORDINATION | Facility: CLINIC | Age: 30
End: 2025-04-28
Payer: COMMERCIAL

## 2025-04-28 NOTE — PROGRESS NOTES
Clinic Care Coordination Contact      Direct care provided in primary language, no  needed.     Follow Up Progress Note      Assessment: CCRN reminded patient of appointments tomorrow at 8am and 8:30 am.     Transportation: [email or web address] Ride: [phone]       Plan: Patient will attend appointment tomorrow as scheduled.

## 2025-04-29 ENCOUNTER — HOSPITAL ENCOUNTER (OUTPATIENT)
Facility: CLINIC | Age: 30
Discharge: HOME | End: 2025-04-29
Attending: INTERNAL MEDICINE | Admitting: INTERNAL MEDICINE
Payer: COMMERCIAL

## 2025-04-29 ENCOUNTER — TELEPHONE (OUTPATIENT)
Dept: CARDIOLOGY | Facility: CLINIC | Age: 30
End: 2025-04-29

## 2025-04-29 VITALS
DIASTOLIC BLOOD PRESSURE: 93 MMHG | OXYGEN SATURATION: 100 % | SYSTOLIC BLOOD PRESSURE: 137 MMHG | HEART RATE: 86 BPM | RESPIRATION RATE: 18 BRPM | WEIGHT: 151.7 LBS | BODY MASS INDEX: 24.97 KG/M2 | TEMPERATURE: 98 F

## 2025-04-29 DIAGNOSIS — I27.20 PULMONARY HYPERTENSION (H): ICD-10-CM

## 2025-04-29 DIAGNOSIS — R06.09 DOE (DYSPNEA ON EXERTION): ICD-10-CM

## 2025-04-29 DIAGNOSIS — I27.20 PULMONARY HYPERTENSION (H): Primary | ICD-10-CM

## 2025-04-29 LAB
ALBUMIN SERPL BCG-MCNC: 4.1 G/DL (ref 3.5–5.2)
ALP SERPL-CCNC: 140 U/L (ref 40–150)
ALT SERPL W P-5'-P-CCNC: 143 U/L (ref 0–70)
ANION GAP SERPL CALCULATED.3IONS-SCNC: 14 MMOL/L (ref 7–15)
AST SERPL W P-5'-P-CCNC: 392 U/L (ref 0–45)
BASOPHILS # BLD MANUAL: 0 10E3/UL (ref 0–0.2)
BASOPHILS NFR BLD MANUAL: 0 %
BILIRUB SERPL-MCNC: 0.6 MG/DL
BUN SERPL-MCNC: 5.7 MG/DL (ref 6–20)
CALCIUM SERPL-MCNC: 9.3 MG/DL (ref 8.8–10.4)
CHLORIDE SERPL-SCNC: 99 MMOL/L (ref 98–107)
CREAT SERPL-MCNC: 0.88 MG/DL (ref 0.67–1.17)
EGFRCR SERPLBLD CKD-EPI 2021: >90 ML/MIN/1.73M2
EOSINOPHIL # BLD MANUAL: 0.1 10E3/UL (ref 0–0.7)
EOSINOPHIL NFR BLD MANUAL: 1 %
ERYTHROCYTE [DISTWIDTH] IN BLOOD BY AUTOMATED COUNT: 13.2 % (ref 10–15)
GLUCOSE SERPL-MCNC: 95 MG/DL (ref 70–99)
HCO3 SERPL-SCNC: 23 MMOL/L (ref 22–29)
HCT VFR BLD AUTO: 37 % (ref 40–53)
HGB BLD-MCNC: 12.6 G/DL (ref 13.3–17.7)
HGB BLD-MCNC: 12.8 G/DL (ref 13.3–17.7)
LYMPHOCYTES # BLD MANUAL: 4.3 10E3/UL (ref 0.8–5.3)
LYMPHOCYTES NFR BLD MANUAL: 69 %
MCH RBC QN AUTO: 32.4 PG (ref 26.5–33)
MCHC RBC AUTO-ENTMCNC: 34.6 G/DL (ref 31.5–36.5)
MCV RBC AUTO: 94 FL (ref 78–100)
METAMYELOCYTES # BLD MANUAL: 0.1 10E3/UL
METAMYELOCYTES NFR BLD MANUAL: 1 %
MONOCYTES # BLD MANUAL: 0.3 10E3/UL (ref 0–1.3)
MONOCYTES NFR BLD MANUAL: 5 %
NEUTROPHILS # BLD MANUAL: 1.5 10E3/UL (ref 1.6–8.3)
NEUTROPHILS NFR BLD MANUAL: 24 %
NT-PROBNP SERPL-MCNC: 40 PG/ML (ref 0–450)
OXYHGB MFR BLDV: 67 % (ref 70–75)
PLAT MORPH BLD: NORMAL
PLATELET # BLD AUTO: 81 10E3/UL (ref 150–450)
POTASSIUM SERPL-SCNC: 3.7 MMOL/L (ref 3.4–5.3)
PROT SERPL-MCNC: 8.8 G/DL (ref 6.4–8.3)
RBC # BLD AUTO: 3.95 10E6/UL (ref 4.4–5.9)
RBC MORPH BLD: NORMAL
SODIUM SERPL-SCNC: 136 MMOL/L (ref 135–145)
WBC # BLD AUTO: 6.3 10E3/UL (ref 4–11)

## 2025-04-29 PROCEDURE — C1894 INTRO/SHEATH, NON-LASER: HCPCS | Performed by: INTERNAL MEDICINE

## 2025-04-29 PROCEDURE — 272N000001 HC OR GENERAL SUPPLY STERILE: Performed by: INTERNAL MEDICINE

## 2025-04-29 PROCEDURE — 250N000009 HC RX 250: Performed by: INTERNAL MEDICINE

## 2025-04-29 PROCEDURE — 85007 BL SMEAR W/DIFF WBC COUNT: CPT | Performed by: INTERNAL MEDICINE

## 2025-04-29 PROCEDURE — 85027 COMPLETE CBC AUTOMATED: CPT | Performed by: INTERNAL MEDICINE

## 2025-04-29 PROCEDURE — 85018 HEMOGLOBIN: CPT

## 2025-04-29 PROCEDURE — 36415 COLL VENOUS BLD VENIPUNCTURE: CPT | Performed by: INTERNAL MEDICINE

## 2025-04-29 PROCEDURE — 93451 RIGHT HEART CATH: CPT | Mod: 26 | Performed by: INTERNAL MEDICINE

## 2025-04-29 PROCEDURE — 82810 BLOOD GASES O2 SAT ONLY: CPT

## 2025-04-29 PROCEDURE — 82565 ASSAY OF CREATININE: CPT | Performed by: INTERNAL MEDICINE

## 2025-04-29 PROCEDURE — 93451 RIGHT HEART CATH: CPT | Performed by: INTERNAL MEDICINE

## 2025-04-29 PROCEDURE — 83880 ASSAY OF NATRIURETIC PEPTIDE: CPT | Performed by: INTERNAL MEDICINE

## 2025-04-29 PROCEDURE — C1751 CATH, INF, PER/CENT/MIDLINE: HCPCS | Performed by: INTERNAL MEDICINE

## 2025-04-29 RX ORDER — LIDOCAINE 40 MG/G
CREAM TOPICAL
Status: COMPLETED | OUTPATIENT
Start: 2025-04-29 | End: 2025-04-29

## 2025-04-29 RX ADMIN — LIDOCAINE: 40 CREAM TOPICAL at 08:01

## 2025-04-29 ASSESSMENT — ACTIVITIES OF DAILY LIVING (ADL)
ADLS_ACUITY_SCORE: 41

## 2025-04-29 NOTE — PRE-PROCEDURE
Consenting/Education for Cardiology Procedure: Right heart catheterization  and possible vasodilator study    Patient understands we would like to perform the listed procedure(s) due to pulmonary hypertension .    The patient understands the following:     The procedure was described to the patient in detail.    No sedation is planned for this procedure. Patient understands risks and complications of the procedure which include but are not limited to bruising/swelling around the incision site, infection, bleeding, allergic reaction to local anesthetic, air embolism, arterial puncture, stroke, heart attack, need for emergency heart surgery, death.       Patient verbalized understanding of risks and benefits and has elected to proceed with the procedure or procedures listed above.    FAWAD New CNP  Cardiology

## 2025-04-29 NOTE — PROGRESS NOTES
Pt s/p RHC. Vitally stable. Denies pain. RIJ site covered with primapore C/D/I, no bleeding or hematoma. Discharge instructions reviewed with pt. Awaiting conversation with Andreas, otherwise ready for discharge.

## 2025-04-29 NOTE — DISCHARGE INSTRUCTIONS
[facility]                        Interventional Cardiology  Discharge Instructions   Post Right Heart Cath       AFTER YOU GO HOME:  DO drink plenty of fluids  DO resume your regular diet and medications unless otherwise instructed by your Primary Physician  Do Not scrub the procedure site vigorously  No lotion or powder to the puncture site for 3 days    ACTIVITY:  Take it easy for the rest of the day. Do not do strenuous exercise and do not lift, pull, or push anything heavy. This lets the catheter site heal.     CARE OF THE CATHETER SITE:    For at least 24 hours, keep the bandage over the spot where the catheter was inserted.   Put ice or a cold pack on the area for 10 to 20 minutes at a time to help with soreness or swelling.   You may shower 24 hours after the procedure. Pat the incision dry.  Don't take a bath for 48 hours    WHEN SHOULD YOU CALL FOR HELP:  Monitor neck site for bleeding, swelling, or voice changes. If you notice bleeding or swelling immediately apply pressure to the site for 15 minutes, and call number below to speak with Cardiology Fellow.  If you experience any changes in your breathing you should call your doctor immediately or come to the closest Emergency Department.  Do not drive yourself.  If you have a fast-growing, painful lump at the catheter site.  If you have symptoms of infection, such as:  Increased pain, swelling, warmth, or redness.  Red streaks leading from the area.  Pus draining from the area.  A fever.    ADDITIONAL INSTRUCTIONS: Medications: You are to resume all home medications including anticoagulation therapy unless otherwise advised by your primary cardiologist or nurse coordinator.    Follow Up: Per your primary cardiology team    If you have any questions or concerns regarding your procedure site please call [phone] at any time & press option 4 to speak to the .  Ask for the Cardiology Fellow on call.  They are available 24 hours  a day.  You may also contact the Cardiology Clinic after hours number at [phone].                                                       Telephone Numbers [phone] Monday-Friday 8:00 am to 4:30 pm    [phone] After 4:30 pm Monday-Friday, Weekends & Holidays  Ask for Interventional Cardiologist on call. Someone is on call 24 hours/day   [facility] toll free number [phone] Monday-Friday 8:00 am to 4:30 pm   [facility] Emergency Dept [phone]

## 2025-04-29 NOTE — PROGRESS NOTES
Problem: At Risk for Falls  Goal: # Patient does not fall  Outcome: Outcome Met, Continue evaluating goal progress toward completion  Goal: # Takes action to control fall-related risks  Outcome: Outcome Met, Continue evaluating goal progress toward completion  Goal: # Verbalizes understanding of fall risk/precautions  Description: Document education using the patient education activity  Outcome: Outcome Met, Continue evaluating goal progress toward completion     Problem: VTE, Risk for  Goal: # No s/s of VTE  Outcome: Outcome Met, Continue evaluating goal progress toward completion  Goal: # Verbalizes understanding of VTE risk factors and prevention  Description: Document education using the patient education activity.   Outcome: Outcome Met, Continue evaluating goal progress toward completion     Problem: Pain  Goal: #Acceptable pain level achieved/maintained at rest using NRS/Faces  Description: This goal is used for patients who can self-report.  Acceptable means the level is at or below the identified comfort/function goal.  Outcome: Outcome Met, Continue evaluating goal progress toward completion  Goal: # Acceptable pain level achieved/maintained with activity using NRS/Faces  Description: This goal is used for patients who can self-report and are not achieving acceptable pain control during activity.  Outcome: Outcome Met, Continue evaluating goal progress toward completion  Goal: # Verbalizes understanding of pain management  Description: Documented in Patient Education Activity  Outcome: Outcome Met, Continue evaluating goal progress toward completion     Problem: Non-Pressure Injury Wound  Goal: # No deterioration in wound  Outcome: Outcome Met, Continue evaluating goal progress toward completion  Goal: # Verbalizes understanding of wound and wound care  Description: If abnormality is a skin tear, avoid using tape on skin including transparent dressings. Document education using the patient education  Service Date:       Dear Dr. Du:    We had the pleasure of seeing Mr. Porter for follow-up in [facility] pulmonary hypertension hemodynamic lab.  As you know, he is a 29-year-old [place] male with past medical history significant for HIV, hepatitis C, alcoholic liver disease, and pulmonary arterial hypertension. He is currently on sildenafil 20 mg 3 times a day, Ambrisentan 10 mg daily, and inhaled treprostinil 64 mcg 4 times a day.  He returns today for follow-up.    Feeling much better.  He has no shortness of breath.  He is able to walk up to 2 to 3 hours without any limitations.  I would currently characterize him as functional class I.  No exertional chest pain or chest pressure.  No exertional presyncope or syncope.  No lower extremity swelling or abdominal distention.  No recent hospitalization or ER visit.  He is compliant with his pulmonary arterial hypertension specific therapies.    PAST MEDICAL HISTORY:  1.  HIV  2.  Hepatitis C  3.  Alcoholic liver disease  4.  Pulmonary arterial hypertension  5.  Polysubstance use with alcohol, cannabis and tobacco    CURRENT MEDICATIONS:  Ambrisentan 10 mg daily,  Sildenafil 20 mg 3 times a day,  Inhaled treprostinil 64 mcg 4 times daily,  Digoxin to 250 mcg daily,  Furosemide 20 mg daily,  Chantix,  BIKTARV -antiretroviral therapy    ROS:   10 point ROS negative except as discussed in above HPI.    EXAM:  /90 (BP Location: Left arm, Cuff Size: Adult Regular)   Pulse 81   Temp 98.2  F (36.8  C) (Oral)   Resp 18   Wt 68.8 kg (151 lb 11.2 oz)   SpO2 99%   BMI 24.97 kg/m    Patient was awake alert, oriented x3.  Patient was comfortable and in no apparent distress.  Had no pallor, cyanosis or jaundice.  Neck exam revealed no jugular venous distention.  Carotids were 2+ bilaterally.  Pulse was regular and rhythm.  Cardiac auscultation revealed normal S1 and normal S2.  No murmur, rub or gallop.  Auscultation of the lungs revealed normal  activity.  Outcome: Outcome Met, Continue evaluating goal progress toward completion  Goal: Participates in wound care activities  Outcome: Outcome Met, Continue evaluating goal progress toward completion     Problem: Cellulitis  Goal: Cellulitis improved as evidenced by decreased redness, swelling and pain  Description: Girth measurement may be used to evaluate edema.  Outcome: Outcome Met, Continue evaluating goal progress toward completion      vesicular breath sounds bilaterally.  Had no crepitations or wheezing.  Abdomen was soft with normal bowel sounds, no tenderness, no rigidity, or guarding.  No focal neurological deficit.  Lower extremities showed no edema.    Labs:  Recent Results (from the past 4 weeks)   Echocardiogram Complete    Collection Time: 04/08/25 10:10 AM   Result Value Ref Range    LVEF  60-65%    Comprehensive metabolic panel    Collection Time: 04/29/25  7:17 AM   Result Value Ref Range    Sodium 136 135 - 145 mmol/L    Potassium 3.7 3.4 - 5.3 mmol/L    Carbon Dioxide (CO2) 23 22 - 29 mmol/L    Anion Gap 14 7 - 15 mmol/L    Urea Nitrogen 5.7 (L) 6.0 - 20.0 mg/dL    Creatinine 0.88 0.67 - 1.17 mg/dL    GFR Estimate >90 >60 mL/min/1.73m2    Calcium 9.3 8.8 - 10.4 mg/dL    Chloride 99 98 - 107 mmol/L    Glucose 95 70 - 99 mg/dL    Alkaline Phosphatase 140 40 - 150 U/L     (H) 0 - 45 U/L     (H) 0 - 70 U/L    Protein Total 8.8 (H) 6.4 - 8.3 g/dL    Albumin 4.1 3.5 - 5.2 g/dL    Bilirubin Total 0.6 <=1.2 mg/dL   NT probnp inpatient and ED    Collection Time: 04/29/25  7:17 AM   Result Value Ref Range    N terminal Pro BNP Inpatient 40 0 - 450 pg/mL   CBC with platelets and differential    Collection Time: 04/29/25  7:17 AM   Result Value Ref Range    WBC Count 6.3 4.0 - 11.0 10e3/uL    RBC Count 3.95 (L) 4.40 - 5.90 10e6/uL    Hemoglobin 12.8 (L) 13.3 - 17.7 g/dL    Hematocrit 37.0 (L) 40.0 - 53.0 %    MCV 94 78 - 100 fL    MCH 32.4 26.5 - 33.0 pg    MCHC 34.6 31.5 - 36.5 g/dL    RDW 13.2 10.0 - 15.0 %    Platelet Count 81 (L) 150 - 450 10e3/uL       Echocardiogram (04/2025):  Left ventricular size, wall motion and function are normal. The ejection  fraction is 60-65%.  Normal right ventricle size and systolic function.  Normal left atrial size.  No hemodynamically significant valvular abnormalities on 2D or color flow  imaging.  Compared to the prior study of 4/9/24 the right ventricular size and function  have  normalized.    RHC (04/2025):  RA 2/2/2  RV17/2  PA 17/4(9)  PCWP 5/8/5    TDCO 5.7, TDCI 3.38  Mora CO 6.67, Mora CI 3.96  AO 95%  VO2 328  PA 68.3%  Hgb 12.6     6MWT (02/2025)  He walked 500 m.  His lowest oxygen saturation on room air was 95%.    Assessment and Plan  In summary he is a 29-year-old male with HIV, pulmonary arterial hypertension, and hepatitis C infection who is establishing care with us.    Impression  #1 Pulmonary arterial hypertension  #2 HIV infection  #3 alcoholic liver disease    I am delighted to say that he is responding to pulmonary arterial hypertension therapy remarkably.  He is functional class I.  He is euvolemic.  His NT proBNP is normal.  His 6-minute walk distance is normal and improved.  His echocardiogram shows normal right ventricular size and function.  His repeat right heart catheterization shows complete remission of pulmonary hypertension with normal PA pressure, PVR and cardiac output.    Thus, he is at low risk for mortality from his pulmonary arterial hypertension.  Henceforth, no indication to escalate his pulmonary vasodilator therapy.  I have recommended him to continue the current triple combination therapy including sildenafil 20 mg 3 times a day, Ambrisentan 10 mg daily and inhaled treprostinil 64 mcg 4 times a day.  He is euvolemic on furosemide 20 mg daily which I have recommended to continue.  No indication for digoxin.  He does not need supplemental oxygen.    He is on antiretroviral therapy for his HIV infection.  He is also followed by hepatology for his hepatitis C and chronic alcoholic liver disease.    I have recommend him to return to see us in 3 months with labs and 6-minute walk test.  He will call us in the interim of any further worsening symptoms.  He understands the importance of compliance with his pulmonary vasodilator therapy.    It was a pleasure meeting  July Brian in our pulm hypertension hemodynamic lab.  Will keep you apprised of his  response to treatment.  We thank you for involving us in his care.  Please do not hesitate to call us in the interim if any further questions.    Total time today was 43 minutes reviewing notes, imaging, labs, patient visit, orders and documentation.    Sincerely  Andreas Johns MD   [facility]  Heart Failure, Transplant, and Mechanical Circulatory Support Cardiology   Cardiovascular Division  [facility]   [phone]

## 2025-04-29 NOTE — TELEPHONE ENCOUNTER
----- Message from Ana SMITH sent at 4/29/2025 11:12 AM CDT -----    ----- Message -----  From: Andreas Johns MD  Sent: 4/29/2025  11:06 AM CDT  To: Cardiology Ph Nurse-    Team,    I saw him in the Cath Lab for follow-up.  As you know he is immigrant from [place] with severe pulmonary hypertension due to HIV and alcoholic liver disease.  He is currently on sildenafil, Ambrisentan and inhaled treprostinil DPI.    He is feeling much better.  His repeat testing including labs, 6-minute walk test, echo and right heart cath are very reassuring.  His pulmonary hypertension has dramatically responded to therapy.    Plan  1.  Continue current 3 drug regimen including sildenafil, Ambrisentan and inhaled treprostinil  2.  Return to clinic in 3 months with Phu or me with 6-minute walk test and labs    Thank you    TT

## 2025-04-30 ENCOUNTER — TELEPHONE (OUTPATIENT)
Dept: CARDIOLOGY | Facility: CLINIC | Age: 30
End: 2025-04-30
Payer: COMMERCIAL

## 2025-04-30 NOTE — TELEPHONE ENCOUNTER
UNABLE TO LVM/NO MYCHART for the patient to call back and schedule the following:    Appointment type: RTN PH  Provider: DEANA  Return date: AUGUST 2025  Specialty phone number: [phone] OPT 1  Additional appointment(s) needed: LABS AND 6MWT  Additonal Notes: 4 MONTH FOLLOW-UP.  NO ORDERS FOR 6MWT.

## 2025-05-01 ENCOUNTER — PATIENT OUTREACH (OUTPATIENT)
Dept: NURSING | Facility: CLINIC | Age: 30
End: 2025-05-01
Payer: COMMERCIAL

## 2025-05-01 NOTE — PROGRESS NOTES
Clinic Care Coordination Contact  Follow Up Progress Note      Assessment: Patient attended his cardiology appointment on 4/29/25. Notes reviewed today. CCRN tried calling patient to assist him scheduled follow-up cardiology with lab but seems like phone is not working. Per chart review, cardiology clinic already tried to reach patient yesterday but not able to as well.    Care Plan: Cardiology       Problem: Pulmonary hypertension       Goal: Patient will attend his follow up cardiology clinic appointment in the next 12 months.       Start Date: 1/6/2025 Expected End Date: 12/31/2025    Recent Progress: 50%    Note:     Barriers: language barrier, low literacy, noncompliance, and lack of knowledge how to navigate complex health care system  Strengths: motivated to attend appt  Patient expressed understanding of goal: Yes    Action steps to achieve this goal:  1. I will attend my procedure on 4/29/25 at 8:00 am arrival time. Completed.   2. I will attend my 4 months follow up with lab TBD  3. I will schedule a follow up appointment with my cardiologist if it is recommended to do so while I am at the clinic.  4. I will answer the phone when  calls to pick me up.                            Care Plan: GI/Hepatology       Problem: Chronic hepatitis C without hepatic coma       Goal: Patient will attend his follow up GI/hepatology clinic appointment in the next 12 months.       Start Date: 3/6/2025 Expected End Date: 3/31/2026    Recent Progress: 30%    Note:     Barriers: language barrier, low literacy, noncompliance, and lack of knowledge how to navigate complex health care system  Strengths: motivated to attend appt  Patient expressed understanding of goal: Yes    Action steps to achieve this goal:  1. I will attend my follow-up appointment with GI specialist as scheduled on 7/21/2025 at 1:45 PM with Nanette Sloan and lab at 1:00 pm. Updated.  2. I will follow up with [facility] regarding this goal  at each outreach until it is completed.        HEPATOLOGY - 3rd Floor  3A    Department Address: [address] [place] [id number]   Department Phone: [phone]                               Care Plan: ID clinic follow-up       Problem: follow-up       Goal: Patient will attend his follow-up ID clinic appointment in the next 12 months.       Start Date: 3/6/2025 Expected End Date: 3/31/2026    Recent Progress: 30%    Note:     Barriers: language barrier, low literacy, noncompliance, and lack of knowledge how to navigate complex health care system  Strengths: motivated to attend appt  Patient expressed understanding of goal: Yes    Action steps to achieve this goal:  1. I will attend my follow-up ID clinic appointment on 8/7/25 at 9:05 am with Augustine Camacho MD.   2. I will attend my physical appointment with PCP as scheduled on 4/21/2025 at 10:10 AM.  3. I will follow up with CCC regarding this goal at each outreach until it is completed.                                 Plan: CHW to assist patient with cardiology appt.

## 2025-05-06 ENCOUNTER — PATIENT OUTREACH (OUTPATIENT)
Dept: CARE COORDINATION | Facility: CLINIC | Age: 30
End: 2025-05-06
Payer: COMMERCIAL

## 2025-05-06 NOTE — PROGRESS NOTES
Clinic Care Coordination Contact  Community Health Worker Follow Up    Care Gaps:   Health Maintenance Due   Topic Date Due    HF ACTION PLAN  Never done    URINE DRUG SCREEN  Never done    COPD ACTION PLAN  Never done    CONTROLLED SUBSTANCE AGREEMENT FOR CHRONIC PAIN MANAGEMENT  Never done    ZOSTER IMMUNIZATION (1 of 2) Never done    COVID-19 Vaccine (2 - Pfizer risk series) 02/14/2024    LIPID  04/01/2025    LEATHA ASSESSMENT  05/09/2025    PHQ-9  05/09/2025     Care Gaps Last addressed on 4/21/2024.     Care Plan:   Care Plan: Cardiology       Problem: Pulmonary hypertension       Goal: Patient will attend his follow up cardiology clinic appointment in the next 12 months.       Start Date: 1/6/2025 Expected End Date: 12/31/2025    This Visit's Progress: 60% Recent Progress: 50%    Note:     Barriers: language barrier, low literacy, noncompliance, and lack of knowledge how to navigate complex health care system  Strengths: motivated to attend appt  Patient expressed understanding of goal: Yes    Action steps to achieve this goal:  1. I will attend my procedure on 4/29/25 at 8:00 am arrival time. Completed.   2. I will attend my 4 months follow up with lab on 8/12/2025 at 10:30 AM with with provider at 11:00 AM.  3. I will schedule a follow up appointment with my cardiologist if it is recommended to do so while I am at the clinic.  4. I will answer the phone when  calls to pick me up.                            Care Plan: GI/Hepatology       Problem: Chronic hepatitis C without hepatic coma       Goal: Patient will attend his follow up GI/hepatology clinic appointment in the next 12 months.       Start Date: 3/6/2025 Expected End Date: 3/31/2026    This Visit's Progress: 40% Recent Progress: 30%    Note:     Barriers: language barrier, low literacy, noncompliance, and lack of knowledge how to navigate complex health care system  Strengths: motivated to attend appt  Patient expressed  understanding of goal: Yes    Action steps to achieve this goal:  1. I will attend my follow-up appointment with GI specialist as scheduled on 7/21/2025 at 1:45 PM with Nanette Sloan and lab at 1:00 pm. Updated patient of the appointment.   2. I will follow up with CCC regarding this goal at each outreach until it is completed.   3. I will call CHW for additional coordination assistance when needed.     HEPATOLOGY - 3rd Floor  3A    Department Address: [address] [place] [id number]   Department Phone: [phone]                               Care Plan: ID clinic follow-up       Problem: follow-up       Goal: Patient will attend his follow-up ID clinic appointment in the next 12 months.       Start Date: 3/6/2025 Expected End Date: 3/31/2026    This Visit's Progress: 40% Recent Progress: 30%    Note:     Barriers: language barrier, low literacy, noncompliance, and lack of knowledge how to navigate complex health care system  Strengths: motivated to attend appt  Patient expressed understanding of goal: Yes    Action steps to achieve this goal:  1. I will attend my follow-up ID clinic appointment on 8/7/25 at 9:05 am with Augustine Camacho MD. Updated patient of the appointment.   2. I will attend my physical appointment with PCP as scheduled on 4/21/2025 at 10:10 AM.  3. I will follow up with CCC regarding this goal at each outreach until it is completed.                           Intervention and Education during outreach:  -CHW reminded patient of upcoming appointment.  -CHW assisted with cardiology and lab follow up appointments.  -Patient informed CHW that he's out of his medications in the box and does not know how to take them. CHW contacted [facility] and was informed that patient was discharged about 2 weeks ago due to patient was not compliance. Per [facility], RN called patient and confirmed of the skills nursing home visit and even called 30 minutes prior to home visit but when  RN arrived and patient was not home and didn't not answer the phone. CHW asked if new order needed because CHW was able to reach patient and patient still needs help for skills nursing and medications set up. Henna from [facility] stated she will talk to Aminta and will call back.   -In the meantime, CHW informed patient to bring all medications and box for RN to set up tomorrow.     CHW Plan: Next Outreach: In one month.

## 2025-05-07 ENCOUNTER — ALLIED HEALTH/NURSE VISIT (OUTPATIENT)
Dept: FAMILY MEDICINE | Facility: CLINIC | Age: 30
End: 2025-05-07
Payer: COMMERCIAL

## 2025-05-07 VITALS — DIASTOLIC BLOOD PRESSURE: 81 MMHG | SYSTOLIC BLOOD PRESSURE: 124 MMHG | HEART RATE: 71 BPM | OXYGEN SATURATION: 96 %

## 2025-05-07 DIAGNOSIS — I27.20 PULMONARY HYPERTENSION (H): Primary | ICD-10-CM

## 2025-05-07 PROCEDURE — 3079F DIAST BP 80-89 MM HG: CPT

## 2025-05-07 PROCEDURE — 3074F SYST BP LT 130 MM HG: CPT

## 2025-05-07 PROCEDURE — 99207 PR NO CHARGE NURSE ONLY: CPT

## 2025-05-07 NOTE — TELEPHONE ENCOUNTER
Rico Lundberg,    No, I just wanted to let Elizabeth know and she can follow up with patient when she's back regarding home care.     Elizabeth,  [facility] called me back that they will not be able to take patient back at this time and unclear it this is a permentent issue but I also called [facility] and let Kenyatta a voice mail to call me back to see if they are accepting new patients for skills nursing.

## 2025-05-07 NOTE — PROGRESS NOTES
I met with Carolina Torres at the request of  Dr. Davis to recheck his blood pressure.  Blood pressure medications on the med list were reviewed with patient.    Patient has taken all medications as per usual regimen: Yes  Patient reports tolerating them without any issues or concerns: Yes    Vitals:    05/07/25 0904   BP: 124/81   Pulse: 71   SpO2: 96%       Blood pressure was taken, previous encounter was reviewed, recorded blood pressure below 140/90.  Patient was discharged and the note will be sent to the provider for final review. RN assisted patient to set up medications from bottles to weekly medset x 2 weeks.  Unknown when home care nurse will be coming. Future appt provided via AVS.    José Miguel Jarvis RN  [facility]

## 2025-06-02 DIAGNOSIS — I27.20 PULMONARY HYPERTENSION (H): ICD-10-CM

## 2025-06-02 DIAGNOSIS — R06.09 DOE (DYSPNEA ON EXERTION): ICD-10-CM

## 2025-06-02 NOTE — TELEPHONE ENCOUNTER
Medication Refill double check:    Last 2A visit was on 4/29/25 with .    Follow up was recommended for August.    Any additional encounters with changes to requested med? no    Authorizing provider is: Dr. Johns    Refill was approved.     Additional orders/notes:       Yaritza Bull RN on 6/2/2025 at 2:52 PM

## 2025-06-05 ENCOUNTER — PATIENT OUTREACH (OUTPATIENT)
Dept: CARE COORDINATION | Facility: CLINIC | Age: 30
End: 2025-06-05
Payer: COMMERCIAL

## 2025-06-05 NOTE — PROGRESS NOTES
Clinic Care Coordination Contact  Community Health Worker Follow Up    Care Gaps:   Health Maintenance Due   Topic Date Due    HF ACTION PLAN  Never done    URINE DRUG SCREEN  Never done    CONTROLLED SUBSTANCE AGREEMENT FOR CHRONIC PAIN MANAGEMENT  Never done    ZOSTER VACCINE (1 of 2) Never done    COVID-19 VACCINE (2 - Pfizer risk series) 02/14/2024    LEATHA ASSESSMENT  05/09/2025    PHQ-9  05/09/2025     Scheduled on 7/15/2025.      Care Plan:   Care Plan: Cardiology       Problem: Pulmonary hypertension       Goal: Patient will attend his follow up cardiology clinic appointment in the next 12 months.       Start Date: 1/6/2025 Expected End Date: 12/31/2025    This Visit's Progress: 70% Recent Progress: 60%    Note:     Barriers: language barrier, low literacy, noncompliance, and lack of knowledge how to navigate complex health care system  Strengths: motivated to attend appt  Patient expressed understanding of goal: Yes    Action steps to achieve this goal:  1. I will attend my procedure on 4/29/25 at 8:00 am arrival time. Completed.   2. I will attend my 4 months follow up with lab on 8/12/2025 at 10:30 AM with with provider at 11:00 AM. Current.  3. I will schedule a follow up appointment with my cardiologist if it is recommended to do so while I am at the clinic.  4. I will answer the phone when  calls to pick me up.                            Care Plan: GI/Hepatology       Problem: Chronic hepatitis C without hepatic coma       Goal: Patient will attend his follow up GI/hepatology clinic appointment in the next 12 months.       Start Date: 3/6/2025 Expected End Date: 3/31/2026    This Visit's Progress: 50% Recent Progress: 40%    Note:     Barriers: language barrier, low literacy, noncompliance, and lack of knowledge how to navigate complex health care system  Strengths: motivated to attend appt  Patient expressed understanding of goal: Yes    Action steps to achieve this goal:  1. I will  attend my follow-up appointment with GI specialist as scheduled on TBD due to current provider is leaving.  2. I will follow up with CCC regarding this goal at each outreach until it is completed.   3. I will call CHW for additional coordination assistance when needed.     HEPATOLOGY - 3rd Floor  3A    Department Address: [address] [place] [id number]   Department Phone: [phone]                               Care Plan: ID clinic follow-up       Problem: follow-up       Goal: Patient will attend his follow-up ID clinic appointment in the next 12 months.       Start Date: 3/6/2025 Expected End Date: 3/31/2026    This Visit's Progress: 40% Recent Progress: 40%    Note:     Barriers: language barrier, low literacy, noncompliance, and lack of knowledge how to navigate complex health care system  Strengths: motivated to attend appt  Patient expressed understanding of goal: Yes    Action steps to achieve this goal:  1. I will attend my follow-up ID clinic appointment on 8/7/25 at 9:05 am with Augustine Camacho MD. Updated patient of the appointment. Goal current.  2. I will attend my physical appointment with PCP as scheduled on 4/21/2025 at 10:10 AM.  3. I will follow up with CCC regarding this goal at each outreach until it is completed.                             Care Plan: Medication regimen       Problem: non-compliance       Goal: Patient will attend in-person appointment with CCRN for MEV in the next 7 months or until able to re-establish care with a home care nurse.       Start Date: 5/30/2025 Expected End Date: 12/31/2025    This Visit's Progress: 20% Recent Progress: 10%    Note:     I want my adult daughter/son to learn how to set up my medications correctly by working with the CCC RN over the next 90 days.      Barriers: language africa, lack of support  Strengths: Agrees to work on goal  Patient expressed understanding of goal: Yes    Action steps to achieve this goal  1. I will  meet with the CCC RN in the clinic on 25. Completed.   2. I will attend my follow-up MEV appointment with CCRN on 25 at 9:00 am.  3. I will take my medications as prescribed and as they are set up by nurse.  4. I/daughter/son will call the CCC RN with concerns or questions.     [facility] pharmacy [phone] - Treprostinil refill #  Per pharmacist at [facility] that rx was  and last updated rx was on 2024                          Intervention and Education during outreach:  -CHW was unsuccessful assisting patient to get Treprostinil (TYVASO DPI) 64 MCG inhaler delivery as pharmacist from [facility] pharmacy inquiring for new rx and patient has not been on this med for quite sometimes and pharmacist is not comfortable sending this out. CHW informed pharmacist that new rx was recently sent and PA was approved on 2025 and still unable to assist patient.   -Patient's GI follow up appointment needed to reschedule with different provider as current provider is leaving the department and message sent to the team for advise and requesting which provider patient should be seeing.   -Patient was informed to call with questions or concerns.     CHW Next Outreach: In one month.

## 2025-06-12 ENCOUNTER — ALLIED HEALTH/NURSE VISIT (OUTPATIENT)
Dept: NURSING | Facility: CLINIC | Age: 30
End: 2025-06-12
Payer: COMMERCIAL

## 2025-06-12 DIAGNOSIS — Z71.89 COMPLEX CARE COORDINATION: Primary | ICD-10-CM

## 2025-06-12 NOTE — PROGRESS NOTES
Clinic Care Coordination Medication Education Follow - Up Visit    Patient presents for:  Medication education, Pill box teaching, Compliance monitoring, and Medication reconciliation    Language: Sandhya  : Yes    Communication: Literate in other languages    Accompanied by:      Patient Living Situation:      Primary Care Provider:  Rosalie Adams    Barriers:  Financial, Language, Cultural, Poor insight into disease process, Inadequate support at home, Non-compliance of medications, and Multiple uncontrolled disease states    Medication List (see cited below): Patient presents with all ordered medications    Current Outpatient Medications   Medication Sig Dispense Refill    ambrisentan (LETAIRIS) 10 MG tablet Take 1 tablet (10 mg) by mouth daily. 30 tablet 11    bictegravir-emtricitabine-tenofovir (BIKTARVY) -25 MG per tablet Take 1 tablet by mouth daily. 30 tablet 11    digoxin (LANOXIN) 250 MCG tablet Take 1 tablet (250 mcg) by mouth daily 30 tablet 11    furosemide (LASIX) 20 MG tablet Take 1 tablet (20 mg) by mouth daily. 90 tablet 1    nicotine (NICODERM CQ) 7 MG/24HR 24 hr patch Place 1 patch over 24 hours onto the skin every 24 hours for 14 days. 14 patch 0    nicotine (NICORETTE) 4 MG lozenge How to take it: When the urge to smoke occurs, suck (do not chew) on 1 lozenge to release nicotine. Do not eat or drink while the lozenge is in your mouth. Follow this schedule: Weeks 1 to 6: One lozenge every 1 to 2 hours. Use at least 9 lozenges a day, but no more than 20. Weeks 7 to 9: One lozenge every 2 to 4 hours. Weeks 10 to 12: One lozenge every 4 to 8 hours 108 lozenge 5    sildenafil (REVATIO) 20 MG tablet Take 1 tablet (20 mg) by mouth 3 times daily. 90 tablet 5    Treprostinil (TYVASO DPI) 64 MCG inhaler Inhale 1 Inhalation (64 mcg) into the lungs 4 times daily. 120 each 5    varenicline (CHANTIX) 0.5 MG tablet Take 0.5 mg (1 tablet) once a day for 3 days, THEN 0.5 mg (1  tablet) twice daily for 4 days, THEN 1.0 mg (2 tablets) twice daily 95 tablet 0    varenicline (CHANTIX) 1 MG tablet Take 1 tablet (1 mg) by mouth 2 times daily. 180 tablet 0     No current facility-administered medications for this visit.       Equipment: 3x per day    Pill Box was set up by RN at visit  today    Medication Set Up: Dependent  Medication Administration:  Independent    Compliance: 50%    Future Appointments   Date Time Provider Department Center   6/12/2025  9:00 AM [facility] RN DACEVGENY [facility]   6/27/2025  1:00 PM [facility] RN DACSUP [facility]   7/15/2025 10:00 AM Rosalie Adams MD DAFMOB [facility]   7/21/2025  1:00 PM [facility]   7/21/2025  2:00 PM Rc Sanon MD [facility]   8/7/2025  9:20 AM [facility]   8/12/2025 10:30 AM [facility]   8/12/2025 11:00 AM Sarah Jernigan PA [facility]       Action Plan  RN Will:  6/27/25    Nursing Notes:  Med set up completed today x 1 week. Patient still has 1 whole week left. Non-compliance. Educated patient on the important of taking his meds daily as set up by CCRN. [facility] discharged patient because patient wasn't home > 5 times. Plan to refer patient to a different home care agency at next visit if he agrees.

## 2025-06-25 ENCOUNTER — TELEPHONE (OUTPATIENT)
Dept: FAMILY MEDICINE | Facility: CLINIC | Age: 30
End: 2025-06-25
Payer: COMMERCIAL

## 2025-06-25 NOTE — TELEPHONE ENCOUNTER
Forms/Letter Request    Type of form/letter: OTHER: Physical Medical Source Statement and write a brief letter with your opinion.       Do we have the form/letter: Yes: Dr. Braden blue folder    Who is the form from? [employer] (if other please explain)    Where did/will the form come from? form was mailed in    When is form/letter needed by: When complete    How would you like the form/letter returned: Fax : [phone]

## 2025-06-26 NOTE — TELEPHONE ENCOUNTER
(TC) collected forms from . Forms pre-filled for provider review, completion, and signature. Forms placed in provider's blue folder.    Last ofv 04/21/2025

## 2025-07-10 ENCOUNTER — PATIENT OUTREACH (OUTPATIENT)
Dept: CARE COORDINATION | Facility: CLINIC | Age: 30
End: 2025-07-10
Payer: COMMERCIAL

## 2025-07-10 NOTE — PROGRESS NOTES
Clinic Care Coordination Contact  Community Health Worker Follow Up    Care Gaps:   Health Maintenance Due   Topic Date Due    HF ACTION PLAN  Never done    URINE DRUG SCREEN  Never done    CONTROLLED SUBSTANCE AGREEMENT FOR CHRONIC PAIN MANAGEMENT  Never done    ZOSTER VACCINE (1 of 2) Never done    COVID-19 VACCINE (2 - Pfizer risk series) 02/14/2024    LEATHA ASSESSMENT  05/09/2025    PHQ-9  05/09/2025     PCP to address other medical care gaps.    Care Plan:   Care Plan: Cardiology       Problem: Pulmonary hypertension       Goal: Patient will attend his follow up cardiology clinic appointment in the next 12 months.       Start Date: 1/6/2025 Expected End Date: 12/31/2025    This Visit's Progress: 70% Recent Progress: 70%    Note:     Barriers: language barrier, low literacy, noncompliance, and lack of knowledge how to navigate complex health care system  Strengths: motivated to attend appt  Patient expressed understanding of goal: Yes    Action steps to achieve this goal:  1. I will attend my procedure on 4/29/25 at 8:00 am arrival time. Completed.   2. I will attend my 4 months follow up with lab on 8/12/2025 at 10:30 AM with with provider at 11:00 AM. Current.  3. I will schedule a follow up appointment with my cardiologist if it is recommended to do so while I am at the clinic.  4. I will answer the phone when  calls to pick me up.                            Care Plan: GI/Hepatology       Problem: Chronic hepatitis C without hepatic coma       Goal: Patient will attend his follow up GI/hepatology clinic appointment in the next 12 months.       Start Date: 3/6/2025 Expected End Date: 3/31/2026    This Visit's Progress: 60% Recent Progress: 50%    Note:     Barriers: language barrier, low literacy, noncompliance, and lack of knowledge how to navigate complex health care system  Strengths: motivated to attend appt  Patient expressed understanding of goal: Yes    Action steps to achieve this  goal:  1. I will attend my follow-up appointment with GI specialist as scheduled on 7/21/2025 at 1:00 PM for lab and with provider at 1?45 PM. Transportation requested with [employer].  2. I will follow up with CCC regarding this goal at each outreach until it is completed.   3. I will call CHW for additional coordination assistance when needed.     HEPATOLOGY - 3rd Floor  3A    Department Address: [address] [place] [id number]   Department Phone: [phone]                               Care Plan: ID clinic follow-up       Problem: follow-up       Goal: Patient will attend his follow-up ID clinic appointment in the next 12 months.       Start Date: 3/6/2025 Expected End Date: 3/31/2026    Recent Progress: 40%    Note:     Barriers: language barrier, low literacy, noncompliance, and lack of knowledge how to navigate complex health care system  Strengths: motivated to attend appt  Patient expressed understanding of goal: Yes    Action steps to achieve this goal:  1. I will attend my follow-up ID clinic appointment on 8/7/25 at 9:05 am with Dr. Tello, Augustine ALLEN MD. Updated patient of the appointment. Transportation requested with [employer].  2. I will attend my physical appointment with PCP as scheduled on 4/21/2025 at 10:10 AM. Attended.  3. I will follow up with CCC regarding this goal at each outreach until it is completed.                             Care Plan: Medication regimen       Problem: non-compliance       Goal: Patient will attend in-person appointment with CCRN for MEV in the next 7 months or until able to re-establish care with a home care nurse.       Start Date: 5/30/2025 Expected End Date: 12/31/2025    This Visit's Progress: 30% Recent Progress: 20%    Note:     I want my adult daughter/son to learn how to set up my medications correctly by working with the CCC RN over the next 90 days.      Barriers: language af[place], lack of support  Strengths: Agrees to work on  goal  Patient expressed understanding of goal: Yes    Action steps to achieve this goal  1. I will meet with the CCC RN in the clinic on 25. Completed.   2. I will attend my follow-up MEV appointment with CCRN on 25 at 9:00 am. Completed.   3. I will attend my follow-up appointment with CCRN on 25 at 1pm for MEV. Attended.  4. I will follow up with CCC RN again as scheduled on 2025 at 11:00 AM.  5. I/daughter/son will call the CCC RN with concerns or questions.     [facility] specialty pharmacy [phone] - Treprostinil refill #  Per pharmacist at [facility] that rx was  and last updated rx was on 2024                            Care Plan: Eye exam       Problem: blurry vision       Goal: Patient will attend his eye appointment in the next 90 days.       Start Date: 2025 Expected End Date: 2025    This Visit's Progress: 20% Recent Progress: 10%    Note:     Barriers: language  Strengths: Willing to schedule  Patient expressed understanding of goal: Yes    Action steps to achieve this goal:  1. I will answer my phone when I am contacted to schedule my Eye Exam appointment.  2. I will attend my Eye Exam at [facility] on 2025 3:10 PM. Reminded patient and transportation requested with [employer]: [phone]   3. I will follow up with [facility] regarding this goal at each outreach until it is completed.     [facility] - [address] [place] [id number]  Ph: [phone]  Fax: [phone]                            Intervention and Education during outreach:  -CHW assisted eye appointment and scheduled transportation and requested transportation for future appointments as well.  -CHW assisted patient with Tyvaso DPI refill with [facility] pharmacy and it will ship out on 7/15/2025 and will be delivered on 2025 and patient is aware. No copay is due for this med.  -Patient was informed to call with questions or concerns.     CHW Next Outreach: In One month.

## 2025-07-10 NOTE — TELEPHONE ENCOUNTER
I have reviewed forms - I think this woul dbe best completed when I see him on 7/15. Can you please call patient and let him know that I plan to fill out his disability forms that day? Please also let Deloris with disability specialists know. Thanks!

## 2025-07-15 NOTE — TELEPHONE ENCOUNTER
Pt no showed 07/15 visit, attempt X1 to reschedule. LVM for return call.      Elizabeth patino advise if you able help with rescheduling if you connect with him for your 07/17 visit.

## 2025-07-17 ENCOUNTER — PATIENT OUTREACH (OUTPATIENT)
Dept: CARE COORDINATION | Facility: CLINIC | Age: 30
End: 2025-07-17

## 2025-07-17 NOTE — PROGRESS NOTES
Clinic Care Coordination Contact  [facility]/Voicemail    Clinical Data: Care Coordinator Outreach    Outreach Documentation Number of Outreach Attempt   7/17/2025   8:51 AM 2       Left message on patient's voicemail with call back information and requested return call.    CCRN wasn't able to reach patient via phone yesterday and again today to follow up if tyvaso was delivered yesterday. Left a  requesting a call back.     CCRN confirmed with [facility] pharmacy today confirmed tyveso was delivered but left in the lobby.     Plan: Care Coordinator will try to reach patient again in 1 month.      Elizabeth Heaton, JOVANI    Lead Care Coordinator  [address]  [address]  [place] [address]   Office: [phone]  Fax: [phone]

## 2025-07-21 ENCOUNTER — OFFICE VISIT (OUTPATIENT)
Dept: GASTROENTEROLOGY | Facility: CLINIC | Age: 30
End: 2025-07-21
Attending: STUDENT IN AN ORGANIZED HEALTH CARE EDUCATION/TRAINING PROGRAM
Payer: COMMERCIAL

## 2025-07-21 ENCOUNTER — LAB (OUTPATIENT)
Dept: LAB | Facility: CLINIC | Age: 30
End: 2025-07-21
Payer: COMMERCIAL

## 2025-07-21 VITALS
BODY MASS INDEX: 24.25 KG/M2 | HEART RATE: 86 BPM | DIASTOLIC BLOOD PRESSURE: 73 MMHG | WEIGHT: 147.3 LBS | TEMPERATURE: 98.4 F | OXYGEN SATURATION: 98 % | SYSTOLIC BLOOD PRESSURE: 121 MMHG

## 2025-07-21 DIAGNOSIS — K74.69 OTHER CIRRHOSIS OF LIVER (H): ICD-10-CM

## 2025-07-21 DIAGNOSIS — F11.21 OPIOID DEPENDENCE IN REMISSION (H): ICD-10-CM

## 2025-07-21 DIAGNOSIS — B20 HUMAN IMMUNODEFICIENCY VIRUS (HIV) DISEASE (H): ICD-10-CM

## 2025-07-21 DIAGNOSIS — B18.2 CHRONIC HEPATITIS C WITHOUT HEPATIC COMA (H): Primary | ICD-10-CM

## 2025-07-21 DIAGNOSIS — I27.20 PULMONARY HYPERTENSION (H): ICD-10-CM

## 2025-07-21 DIAGNOSIS — F10.29 ALCOHOL DEPENDENCE WITH UNSPECIFIED ALCOHOL-INDUCED DISORDER (H): ICD-10-CM

## 2025-07-21 DIAGNOSIS — B18.2 CHRONIC HEPATITIS C WITHOUT HEPATIC COMA (H): ICD-10-CM

## 2025-07-21 DIAGNOSIS — K74.02 HEPATIC FIBROSIS, ADVANCED FIBROSIS: ICD-10-CM

## 2025-07-21 DIAGNOSIS — I50.812 CHRONIC RIGHT-SIDED HEART FAILURE (H): ICD-10-CM

## 2025-07-21 LAB
ALBUMIN SERPL BCG-MCNC: 4.4 G/DL (ref 3.5–5.2)
ALP SERPL-CCNC: 133 U/L (ref 40–150)
ALT SERPL W P-5'-P-CCNC: 135 U/L (ref 0–70)
ANION GAP SERPL CALCULATED.3IONS-SCNC: 14 MMOL/L (ref 7–15)
AST SERPL W P-5'-P-CCNC: 342 U/L (ref 0–45)
BILIRUB SERPL-MCNC: 0.6 MG/DL
BILIRUBIN DIRECT (ROCHE PRO & PURE): 0.32 MG/DL (ref 0–0.45)
BUN SERPL-MCNC: 8.2 MG/DL (ref 6–20)
CALCIUM SERPL-MCNC: 9.5 MG/DL (ref 8.8–10.4)
CHLORIDE SERPL-SCNC: 104 MMOL/L (ref 98–107)
CREAT SERPL-MCNC: 0.7 MG/DL (ref 0.67–1.17)
EGFRCR SERPLBLD CKD-EPI 2021: >90 ML/MIN/1.73M2
ERYTHROCYTE [DISTWIDTH] IN BLOOD BY AUTOMATED COUNT: 13.2 % (ref 10–15)
GLUCOSE SERPL-MCNC: 97 MG/DL (ref 70–99)
HCO3 SERPL-SCNC: 26 MMOL/L (ref 22–29)
HCT VFR BLD AUTO: 35.8 % (ref 40–53)
HGB BLD-MCNC: 11.9 G/DL (ref 13.3–17.7)
INR PPP: 0.97 (ref 0.85–1.15)
MCH RBC QN AUTO: 33.3 PG (ref 26.5–33)
MCHC RBC AUTO-ENTMCNC: 33.2 G/DL (ref 31.5–36.5)
MCV RBC AUTO: 100 FL (ref 78–100)
PLATELET # BLD AUTO: 126 10E3/UL (ref 150–450)
POTASSIUM SERPL-SCNC: 3.6 MMOL/L (ref 3.4–5.3)
PROT SERPL-MCNC: 8.8 G/DL (ref 6.4–8.3)
PROTHROMBIN TIME: 13.1 SECONDS (ref 11.8–14.8)
RBC # BLD AUTO: 3.57 10E6/UL (ref 4.4–5.9)
SODIUM SERPL-SCNC: 144 MMOL/L (ref 135–145)
WBC # BLD AUTO: 5.5 10E3/UL (ref 4–11)

## 2025-07-21 PROCEDURE — 85027 COMPLETE CBC AUTOMATED: CPT | Performed by: PATHOLOGY

## 2025-07-21 PROCEDURE — G0463 HOSPITAL OUTPT CLINIC VISIT: HCPCS | Performed by: STUDENT IN AN ORGANIZED HEALTH CARE EDUCATION/TRAINING PROGRAM

## 2025-07-21 PROCEDURE — 80053 COMPREHEN METABOLIC PANEL: CPT | Performed by: PATHOLOGY

## 2025-07-21 PROCEDURE — 36415 COLL VENOUS BLD VENIPUNCTURE: CPT | Performed by: PATHOLOGY

## 2025-07-21 PROCEDURE — 85610 PROTHROMBIN TIME: CPT | Performed by: PATHOLOGY

## 2025-07-21 PROCEDURE — T1013 SIGN LANG/ORAL INTERPRETER: HCPCS | Mod: U4

## 2025-07-21 PROCEDURE — 82248 BILIRUBIN DIRECT: CPT | Performed by: PATHOLOGY

## 2025-07-21 ASSESSMENT — ANXIETY QUESTIONNAIRES
5. BEING SO RESTLESS THAT IT IS HARD TO SIT STILL: NOT AT ALL
4. TROUBLE RELAXING: NOT AT ALL
GAD7 TOTAL SCORE: 1
GAD7 TOTAL SCORE: 1
IF YOU CHECKED OFF ANY PROBLEMS ON THIS QUESTIONNAIRE, HOW DIFFICULT HAVE THESE PROBLEMS MADE IT FOR YOU TO DO YOUR WORK, TAKE CARE OF THINGS AT HOME, OR GET ALONG WITH OTHER PEOPLE: NOT DIFFICULT AT ALL
3. WORRYING TOO MUCH ABOUT DIFFERENT THINGS: SEVERAL DAYS
2. NOT BEING ABLE TO STOP OR CONTROL WORRYING: NOT AT ALL
6. BECOMING EASILY ANNOYED OR IRRITABLE: NOT AT ALL
1. FEELING NERVOUS, ANXIOUS, OR ON EDGE: NOT AT ALL
7. FEELING AFRAID AS IF SOMETHING AWFUL MIGHT HAPPEN: NOT AT ALL

## 2025-07-21 ASSESSMENT — PAIN SCALES - GENERAL: PAINLEVEL_OUTOF10: NO PAIN (0)

## 2025-07-21 NOTE — PATIENT INSTRUCTIONS
It was a pleasure taking care of you today.  I've included a brief summary of our discussion and care plan from today's visit below.  Please review this information with your primary care provider.  _______________________________________________________________________    My recommendations are summarized as follows:    - Repeat labs in 6 months.  - Schedule ultrasound next week.  - You will need an upper endoscopy    Return to Liver/Hepatology Clinic in 6 months.    _______________________________________________________________________     July 21, 2025    To Whom It May Concern,    This letter is to confirm that [Win, July] attended the liver clinic at our facility today, July 21, 2025. During the visit, [he] underwent a comprehensive evaluation to assess liver function and discuss ongoing management strategies. Please feel free to contact our office at [phone] if you require any further information.    Sincerely,    Rc Gutierrez MD  _______________________________________________________________________    Scheduling Procedures or Tests  - To schedule endoscopic procedures call: [phone]  - To schedule radiology tests call: [phone] (for [facility]) or [phone] (for [facility])   _______________________________________________________________________    Who do I call with any questions after my visit?  Please be in touch if there are any further questions that arise following today's visit.  There are multiple ways to contact your gastroenterology care team.      To schedule or reschedule an appointment, please call [phone] and choose option 2 (for hepatology) and then option 1 (for scheduling).    During business hours, you may reach a nurse by calling the appointment line ([phone]) and asking to speak with a nurse    You can send a secure message through [employer] for non-urgent questions. [employer] messages are answered by your nurse or doctor typically within 24 to  48 hours. Please allow extra time on weekends and holidays.      For urgent/emergent questions after business hours, you may reach the on-call GI Fellow by contacting the [facility]  at [phone].     How will I get the results of any tests ordered?    - If you have signed up for MyChart, any tests ordered at your visit will be available to you after your physician reviews them.  Typically this takes 1-2 weeks.    - If you do not have MyChart, your results will either be mailed to you as a letter or via a telephone call.  - If there are urgent results that require a change in your care plan, your physician or nurse will call you to discuss the next steps.     What is [employer]?  [employer] is a secure way for you to access all of your healthcare records from the [facility].  It is a web based computer program, so you can sign on to it from any location.  It also allows you to send secure messages to your care team.  I recommend signing up for "[employer]t access if you have not already done so and are comfortable with using a computer.      How to I schedule a follow-up visit?  If you did not schedule a follow-up visit today, please call [phone] to schedule a follow-up office visit.     Sincerely,    Rc Gutierrez MD  Gastroenterology Fellow   Division of Gastroenterology, Hepatology and Nutrition  [facility]  Pager: [phone]

## 2025-07-21 NOTE — NURSING NOTE
Chief Complaint   Patient presents with    Follow Up     6 month follow up       Blood pressure 121/73, pulse 86, temperature 98.4  F (36.9  C), temperature source Oral, weight 66.8 kg (147 lb 4.8 oz), SpO2 98%.      Nellie Daly

## 2025-07-21 NOTE — LETTER
7/21/2025      Carolina Torres  [address]  [place] [address]      Dear Colleague,    Thank you for referring your patient, Carolina Torres, to the [facility] [place]. Please see a copy of my visit note below.    Hepatology Follow-Up Visit:     Follow up for Hepatitis C    Prior visits:   New patient visit 5/1/24 - Initial GI visit for untreated Hep C for consideration of DAA. At that time he reported ongoing EtOH use, ~1 bottle (about the size of his forearm) every 2-3 days, which he had been doing for several years. Had FIB4 of 12.3, elevated INR, low albumin, palpable liver edge concerning for advanced liver disease/cirrhosis. Plan was to obtain abdominal US with elastography followed by treatment with Mavyret.     Patient completed elastography on 10/2/24 revealing advanced chronic liver disease with median liver stiffness of 2.25 m/sec and IQR/median value of 0.12. No focal hepatic lesion noted. Hep C viral load in 9/2024 was 12,200,000 from 8,590,000 in 1/2024. Reactive Hep B core Josefina and Surface Josefina with negative Surface Agn, consistent with prior infection. Hep A Josefina reactive.     He was started on Mavyret with his first dose on 11/17/24. Of note, patient instructed to stop his digoxin while on Mavyret due to drug interaction.     1/2025 visit: endorsed multiple missed doses (estimates that he's missed 7-8 days of medication in total). He continues consuming alcohol heavily, with a large bottle of vodka lasting him 3 days. He acknowledges that he was instructed to stop using alcohol while taking Mavyret. He declines any referral to CD treatment resources stating that he's able to stop drinking alcohol without any additional assistance.     HISTORY OF PRESENT ILLNESS:   30 year old male with a PMHx pertinent for chronic Hepatitis C (Genotype 3A), HIV on Biktarvy (CD4 count 491 in 10/2024), severe pulmonary hypertension, polysubstance abuse (prior IVDU, cocaine) with prior heavy  "alcohol use presenting today for follow up.     Patient is Kostas speaking and telephone  used for the entirety of this clinical encounter.     Today, he mentions that completed Mavyret toño last on 5/2025 HCV RNA was negative. He says that he quit alcohol 10 days. 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 Denies cravings, no withdrawal. May have some RUQ abdominal pain intermittently, every 2-3 days. Mainly associated to drinking.     Patient denies any chest pain, shortness of breath, or palpitations.     Patient denies jaundice, lower extremity edema, abdominal distension, lethargy or confusion. He denies melena, hematemesis or hematochezia. Denies fevers, sweats or chills. Weight stable.    Review of systems  A targeted GI review of systems complete and is otherwise negative.     Surgical History:   Past Surgical History:   Procedure Laterality Date     CV RIGHT HEART CATH MEASUREMENTS RECORDED N/A 4/9/2024    Procedure: Heart Cath Right Heart Cath;  Surgeon: Andreas Johns MD;  Location:  HEART CARDIAC CATH LAB     CV RIGHT HEART CATH MEASUREMENTS RECORDED N/A 4/29/2025    Procedure: Heart Cath Right Heart Cath;  Surgeon: Andreas Johns MD;  [facility]: [facility]     CV RIGHT HEART CATH PULMONARY VASODILATOR STUDY N/A 4/9/2024    Procedure: Right Heart Cath Pulmonary Vasodilator Study;  Surgeon: Andreas Johns MD;  Location: [facility]       Other PMHx:  Past Medical History:   Diagnosis Date     Alcohol dependence (H)      Cannabis abuse      Current smoker      Hepatitis C      Human immunodeficiency virus (HIV) disease (H) 2019    upon arrival is on Dolutegravir/Lamivudine/Tenovir 50/300/300  one daily; CD4 788 (7/23)     Opioid use disorder, moderate, in sustained remission (H)     diagnosis upon [place] in [place] 1/[place]24     Other ascites      Pulmonary hypertension (H)     severely impaired right ventricular systolic " function     Reaction to QuantiFERON-TB test (QFT) without active tuberculosis     class B1 upon arrival to [place]     Tattoos     right hand, left leg       Family History:   No family history on file.    Social History:   Social History     Tobacco Use     Smoking status: Some Days     Current packs/day: 0.10     Average packs/day: 0.1 packs/day for 1.2 years (0.1 ttl pk-yrs)     Types: Cigarettes, Vaping Device     Start date: 05/2024     Passive exposure: Current     Smokeless tobacco: Former     Tobacco comments:     Smokes 1-2 homemade cigs every few days, had a period when he quit in May 2024   Substance Use Topics     Alcohol use: Yes     Comment: drinks 1 glass daily, white alcohol, drinks after he eats a meal       Medications:   Current Outpatient Medications   Medication Sig Dispense Refill     ambrisentan (LETAIRIS) 10 MG tablet Take 1 tablet (10 mg) by mouth daily. 30 tablet 11     bictegravir-emtricitabine-tenofovir (BIKTARVY) -25 MG per tablet Take 1 tablet by mouth daily. 30 tablet 11     digoxin (LANOXIN) 250 MCG tablet Take 1 tablet (250 mcg) by mouth daily 30 tablet 11     furosemide (LASIX) 20 MG tablet Take 1 tablet (20 mg) by mouth daily. 90 tablet 1     nicotine (NICORETTE) 4 MG lozenge How to take it: When the urge to smoke occurs, suck (do not chew) on 1 lozenge to release nicotine. Do not eat or drink while the lozenge is in your mouth. Follow this schedule: Weeks 1 to 6: One lozenge every 1 to 2 hours. Use at least 9 lozenges a day, but no more than 20. Weeks 7 to 9: One lozenge every 2 to 4 hours. Weeks 10 to 12: One lozenge every 4 to 8 hours 108 lozenge 5     sildenafil (REVATIO) 20 MG tablet Take 1 tablet (20 mg) by mouth 3 times daily. 90 tablet 5     Treprostinil (TYVASO DPI) 64 MCG inhaler Inhale 1 Inhalation (64 mcg) into the lungs 4 times daily. 120 each 5     varenicline (CHANTIX) 0.5 MG tablet Take 0.5 mg (1 tablet) once a day for 3 days, THEN 0.5 mg (1 tablet) twice daily  for 4 days, THEN 1.0 mg (2 tablets) twice daily 95 tablet 0     varenicline (CHANTIX) 1 MG tablet Take 1 tablet (1 mg) by mouth 2 times daily. 180 tablet 0     No current facility-administered medications for this visit.        Allergies  No Known Allergies    Vitals:   There were no vitals taken for this visit.  There is no height or weight on file to calculate BMI.      Physical Exam:   Constitutional: Well-developed, well-nourished, in no apparent distress.    HEENT: Normocephalic. No scleral icterus. Moist oral mucosa. Dentition poor.  Neck/Lymph: Normal ROM, supple. No thyromegaly.  No lymphadenopathy  Cardiac: Well perfused.   Respiratory: Breathing comfortably on room air.   GI:  Abdomen is non-distended.   Skin: Skin is warm and dry. No rash noted.  No jaundice. No spider nevi noted.  No palmar erythema  Peripheral Vascular: No lower extremity edema.   Musculoskeletal:  ROM intact, Normal muscle bulk    Psychiatric: Normal mood and affect. Behavior is normal.  Neuro: No tremor.      Labs:   Lab Results   Component Value Date     04/29/2025    POTASSIUM 3.7 04/29/2025    CHLORIDE 99 04/29/2025    ANIONGAP 14 04/29/2025    CO2 23 04/29/2025    BUN 5.7 (L) 04/29/2025    CR 0.88 04/29/2025    GFRESTIMATED >90 04/29/2025    ASHLEY 9.3 04/29/2025      Lab Results   Component Value Date    WBC 6.3 04/29/2025    HGB 12.6 (L) 04/29/2025    HCT 37.0 (L) 04/29/2025    MCV 94 04/29/2025    MCH 32.4 04/29/2025    MCHC 34.6 04/29/2025    RDW 13.2 04/29/2025    PLT 81 (L) 04/29/2025     Lab Results   Component Value Date    ALBUMIN 4.1 04/29/2025    ALKPHOS 140 04/29/2025     (H) 04/29/2025     Lab Results   Component Value Date    INR 0.98 02/05/2025       MELD 3.0: 6 at 2/6/2025  9:58 AM  MELD-Na: 6 at 2/6/2025  9:58 AM  Calculated from:  Serum Creatinine: 0.86 mg/dL (Using min of 1 mg/dL) at 2/6/2025  9:58 AM  Serum Sodium: 142 mmol/L (Using max of 137 mmol/L) at 2/6/2025  9:58 AM  Total Bilirubin: 0.4 mg/dL  (Using min of 1 mg/dL) at 2/6/2025  9:58 AM  Serum Albumin: 4.3 g/dL (Using max of 3.5 g/dL) at 2/6/2025  9:58 AM  INR(ratio): 0.98 (Using min of 1) at 2/5/2025  7:17 AM  Age at listing (hypothetical): 29 years  Sex: Male at 2/6/2025  9:58 AM    Procedures:  Liver biopsy: None  EGD: None  Colonoscopy: None    Relevant Imaging:   US Elastography 10/2024  IMPRESSION:  1: Cirrhotic hepatic morphology with evidence of underlying hepatic  steatosis. No focal hepatic lesion.  a. LI-RADS US Category: US-1 Negative: No US evidence of HCC  b. Recommend continued surveillance US.  2. The median liver stiffness is 2.25 m/sec and the IQR/median value  is 0.12. This is a high elastography value indicating advanced chronic  liver disease.    Assessment/Plan:   30 year old male 29 year old male with a PMHx pertinent for chronic, compensated Hepatitis C (Genotype 3A), HIV on Biktarvy (CD4 count 491 in 10/2024), severe pulmonary hypertension, polysubstance abuse (prior IVDU, cocaine) with ongoing heavy alcohol use presenting today for follow up.     #. Compensated Hepatitis C cirrhosis  #. EtOH Dependence, in remission currently  #. Abnormal Liver Tests   #. Hx of HIV   - Completed 8 week course of Mavyret (first dose 11/17/24) though frequently missing doses   - Has stopped heavy alcohol consumption. Last drink 7/11  - ALT (135 from 143), AST (342 from 392) same.   - Elastography in 10/2/24 with advanced chronic liver disease with median liver stiffness of 2.25 m/sec and IQR/median value of 0.12. No focal hepatic lesion noted.   - Hep C viral load in 9/2024 was 12,200,000 from 8,590,000 in 1/2024. Repeated RNA 5/30/2025 was negative.   - Reactive Hep B core Josefina and Surface Josefina with negative Surface Agn, consistent with prior infection. Hep A Ab reactive.     PLAN:   - Obtain Hepatitis C RNA to assess SVR (8/2025).   - Re-order Abdominal US ordered today   - Has currently stopped drinking, not willing to undergo CD.   - Work letter  as part of patient instructions  - EGD ordered.  - We would consider liver biopsy next visit if aminotransferases still elevated despite SVR.    Return to Clinic: 6 months    Thank you very much for allowing me to participate in the care of this patient.  If you have any questions regarding my recommendations, please do not hesitate to contact me.       Patient discussed and seen with Staff Dr. Aviles, who is in agreement with the above.     I spent 52 minutes on this encounter performing the following: reviewing the patient's medical record (clinic visits, hospital records, lab results, imaging and procedural documentation), history taking, physical exam and documentation on the date of the encounter. I also spent part of the time in coordination of care and counseling.    Outpatient  Established level 5 [id number]: 40-54 minutes - RVU 2.80     The longitudinal plan of care for HCV and compensated cirrhosis as documented were addressed during this visit. Due to the added complexity in care, I will continue to support July in the subsequent management and with ongoing continuity of care.      Rc Gutierrez MD  Gastroenterology Fellow   Division of Gastroenterology, Hepatology and Nutrition  [facility]  Pager: [phone]         Attestation signed by Yoon Aviles MD at 7/22/2025  6:17 PM (Updated):  [facility]  HEPATOLOGY STAFF NOTE  07/22/25    Visit completed with use of [employer] .    Brief patient summary:    ACLD 2/2 chronic HCV, recently achieved EOTR with DAA, + co infected HIV on ART + EtOH + pulmonary HTN (possible cardiac /congestive hepatopathy component).    ACLD based on prior US elastography + FIB4.    Has not had any EtOH in 2 weeks due to new job.    TAs remain elevated (AST >ALT in 100s).    PLAN:  - SVR 12 due August.  - Overdue for hepatoma screening.  - EGD for variceal screening.  - Re assessment of fibrosis next year (following SVR). However, should LT  persist despite SVR and off EtOH, would proceed with liver bx also to look for other HIV-related liver disease such as NRH, etc.  - Would not be an OLT candidate; multiple times refused Chem Dep/assessment.    Rest per resident/fellow/GONZALO note from today. The patient was seen and examined with the resident/fellow physician or GONZALO.  We have discussed the patient in detail and I agree with the findings, assessment, and plan as documented.     I spent a total of 40 minutes on date of service (as above) reviewing labs, imaging, documenting and in communication with the patient and discussing the case with Dr. Gutierrez.       Yoon Aviles M.D.  Advanced Transplant/Hepatology  [facility]      Again, thank you for allowing me to participate in the care of your patient.        Sincerely,        Rc Katz MD    Electronically signed

## 2025-07-21 NOTE — PROGRESS NOTES
Hepatology Follow-Up Visit:     Follow up for Hepatitis C    Prior visits:   New patient visit 5/1/24 - Initial GI visit for untreated Hep C for consideration of DAA. At that time he reported ongoing EtOH use, ~1 bottle (about the size of his forearm) every 2-3 days, which he had been doing for several years. Had FIB4 of 12.3, elevated INR, low albumin, palpable liver edge concerning for advanced liver disease/cirrhosis. Plan was to obtain abdominal US with elastography followed by treatment with Mavyret.     Patient completed elastography on 10/2/24 revealing advanced chronic liver disease with median liver stiffness of 2.25 m/sec and IQR/median value of 0.12. No focal hepatic lesion noted. Hep C viral load in 9/2024 was 12,200,000 from 8,590,000 in 1/2024. Reactive Hep B core Josefina and Surface Josefina with negative Surface Agn, consistent with prior infection. Hep A Josefina reactive.     He was started on Mavyret with his first dose on 11/17/24. Of note, patient instructed to stop his digoxin while on Mavyret due to drug interaction.     1/2025 visit: endorsed multiple missed doses (estimates that he's missed 7-8 days of medication in total). He continues consuming alcohol heavily, with a large bottle of vodka lasting him 3 days. He acknowledges that he was instructed to stop using alcohol while taking Mavyret. He declines any referral to CD treatment resources stating that he's able to stop drinking alcohol without any additional assistance.     HISTORY OF PRESENT ILLNESS:   30 year old male with a PMHx pertinent for chronic Hepatitis C (Genotype 3A), HIV on Biktarvy (CD4 count 491 in 10/2024), severe pulmonary hypertension, polysubstance abuse (prior IVDU, cocaine) with prior heavy alcohol use presenting today for follow up.     Patient is Kostas speaking and telephone  used for the entirety of this clinical encounter.     Today, he mentions that completed Mavyret toño last on 5/2025 HCV RNA was negative. He  "says that he quit alcohol 10 days. 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 Denies cravings, no withdrawal. May have some RUQ abdominal pain intermittently, every 2-3 days. Mainly associated to drinking.     Patient denies any chest pain, shortness of breath, or palpitations.     Patient denies jaundice, lower extremity edema, abdominal distension, lethargy or confusion. He denies melena, hematemesis or hematochezia. Denies fevers, sweats or chills. Weight stable.    Review of systems  A targeted GI review of systems complete and is otherwise negative.     Surgical History:   Past Surgical History:   Procedure Laterality Date    CV RIGHT HEART CATH MEASUREMENTS RECORDED N/A 4/9/2024    Procedure: Heart Cath Right Heart Cath;  Surgeon: Andreas Johns MD;  Location:  HEART CARDIAC CATH LAB    CV RIGHT HEART CATH MEASUREMENTS RECORDED N/A 4/29/2025    Procedure: Heart Cath Right Heart Cath;  Surgeon: Andreas Johns MD;  Location:  HEART CARDIAC CATH LAB    CV RIGHT HEART CATH PULMONARY VASODILATOR STUDY N/A 4/9/2024    Procedure: Right Heart Cath Pulmonary Vasodilator Study;  Surgeon: Andreas Johns MD;  Location:  HEART CARDIAC CATH LAB       Other PMHx:  Past Medical History:   Diagnosis Date    Alcohol dependence (H)     Cannabis abuse     Current smoker     Hepatitis C     Human immunodeficiency virus (HIV) disease (H) 2019    upon arrival is on Dolutegravir/Lamivudine/Tenovir 50/300/300  one daily; CD4 788 (7/23)    Opioid use disorder, moderate, in sustained remission (H)     diagnosis [place] 1[place]    Other ascites     Pulmonary hypertension (H)     severely impaired right ventricular systolic function    Reaction to QuantiFERON-TB test (QFT) without active tuberculosis     class B1 upon arrival to [place]    Tattoos     right hand, left leg       Family History:   No family history on file.    Social History:   Social History     Tobacco Use    " Smoking status: Some Days     Current packs/day: 0.10     Average packs/day: 0.1 packs/day for 1.2 years (0.1 ttl pk-yrs)     Types: Cigarettes, Vaping Device     Start date: 05/2024     Passive exposure: Current    Smokeless tobacco: Former    Tobacco comments:     Smokes 1-2 homemade cigs every few days, had a period when he quit in May 2024   Substance Use Topics    Alcohol use: Yes     Comment: drinks 1 glass daily, white alcohol, drinks after he eats a meal       Medications:   Current Outpatient Medications   Medication Sig Dispense Refill    ambrisentan (LETAIRIS) 10 MG tablet Take 1 tablet (10 mg) by mouth daily. 30 tablet 11    bictegravir-emtricitabine-tenofovir (BIKTARVY) -25 MG per tablet Take 1 tablet by mouth daily. 30 tablet 11    digoxin (LANOXIN) 250 MCG tablet Take 1 tablet (250 mcg) by mouth daily 30 tablet 11    furosemide (LASIX) 20 MG tablet Take 1 tablet (20 mg) by mouth daily. 90 tablet 1    nicotine (NICORETTE) 4 MG lozenge How to take it: When the urge to smoke occurs, suck (do not chew) on 1 lozenge to release nicotine. Do not eat or drink while the lozenge is in your mouth. Follow this schedule: Weeks 1 to 6: One lozenge every 1 to 2 hours. Use at least 9 lozenges a day, but no more than 20. Weeks 7 to 9: One lozenge every 2 to 4 hours. Weeks 10 to 12: One lozenge every 4 to 8 hours 108 lozenge 5    sildenafil (REVATIO) 20 MG tablet Take 1 tablet (20 mg) by mouth 3 times daily. 90 tablet 5    Treprostinil (TYVASO DPI) 64 MCG inhaler Inhale 1 Inhalation (64 mcg) into the lungs 4 times daily. 120 each 5    varenicline (CHANTIX) 0.5 MG tablet Take 0.5 mg (1 tablet) once a day for 3 days, THEN 0.5 mg (1 tablet) twice daily for 4 days, THEN 1.0 mg (2 tablets) twice daily 95 tablet 0    varenicline (CHANTIX) 1 MG tablet Take 1 tablet (1 mg) by mouth 2 times daily. 180 tablet 0     No current facility-administered medications for this visit.        Allergies  No Known Allergies    Vitals:    There were no vitals taken for this visit.  There is no height or weight on file to calculate BMI.      Physical Exam:   Constitutional: Well-developed, well-nourished, in no apparent distress.    HEENT: Normocephalic. No scleral icterus. Moist oral mucosa. Dentition poor.  Neck/Lymph: Normal ROM, supple. No thyromegaly.  No lymphadenopathy  Cardiac: Well perfused.   Respiratory: Breathing comfortably on room air.   GI:  Abdomen is non-distended.   Skin: Skin is warm and dry. No rash noted.  No jaundice. No spider nevi noted.  No palmar erythema  Peripheral Vascular: No lower extremity edema.   Musculoskeletal:  ROM intact, Normal muscle bulk    Psychiatric: Normal mood and affect. Behavior is normal.  Neuro: No tremor.      Labs:   Lab Results   Component Value Date     04/29/2025    POTASSIUM 3.7 04/29/2025    CHLORIDE 99 04/29/2025    ANIONGAP 14 04/29/2025    CO2 23 04/29/2025    BUN 5.7 (L) 04/29/2025    CR 0.88 04/29/2025    GFRESTIMATED >90 04/29/2025    ASHLEY 9.3 04/29/2025      Lab Results   Component Value Date    WBC 6.3 04/29/2025    HGB 12.6 (L) 04/29/2025    HCT 37.0 (L) 04/29/2025    MCV 94 04/29/2025    MCH 32.4 04/29/2025    MCHC 34.6 04/29/2025    RDW 13.2 04/29/2025    PLT 81 (L) 04/29/2025     Lab Results   Component Value Date    ALBUMIN 4.1 04/29/2025    ALKPHOS 140 04/29/2025     (H) 04/29/2025     Lab Results   Component Value Date    INR 0.98 02/05/2025       MELD 3.0: 6 at 2/6/2025  9:58 AM  MELD-Na: 6 at 2/6/2025  9:58 AM  Calculated from:  Serum Creatinine: 0.86 mg/dL (Using min of 1 mg/dL) at 2/6/2025  9:58 AM  Serum Sodium: 142 mmol/L (Using max of 137 mmol/L) at 2/6/2025  9:58 AM  Total Bilirubin: 0.4 mg/dL (Using min of 1 mg/dL) at 2/6/2025  9:58 AM  Serum Albumin: 4.3 g/dL (Using max of 3.5 g/dL) at 2/6/2025  9:58 AM  INR(ratio): 0.98 (Using min of 1) at 2/5/2025  7:17 AM  Age at listing (hypothetical): 29 years  Sex: Male at 2/6/2025  9:58 AM    Procedures:  Liver  biopsy: None  EGD: None  Colonoscopy: None    Relevant Imaging:   US Elastography 10/2024  IMPRESSION:  1: Cirrhotic hepatic morphology with evidence of underlying hepatic  steatosis. No focal hepatic lesion.  a. LI-RADS US Category: US-1 Negative: No US evidence of HCC  b. Recommend continued surveillance US.  2. The median liver stiffness is 2.25 m/sec and the IQR/median value  is 0.12. This is a high elastography value indicating advanced chronic  liver disease.    Assessment/Plan:   30 year old male 29 year old male with a PMHx pertinent for chronic, compensated Hepatitis C (Genotype 3A), HIV on Biktarvy (CD4 count 491 in 10/2024), severe pulmonary hypertension, polysubstance abuse (prior IVDU, cocaine) with ongoing heavy alcohol use presenting today for follow up.     #. Compensated Hepatitis C cirrhosis  #. EtOH Dependence, in remission currently  #. Abnormal Liver Tests   #. Hx of HIV   - Completed 8 week course of Mavyret (first dose 11/17/24) though frequently missing doses   - Has stopped heavy alcohol consumption. Last drink 7/11  - ALT (135 from 143), AST (342 from 392) same.   - Elastography in 10/2/24 with advanced chronic liver disease with median liver stiffness of 2.25 m/sec and IQR/median value of 0.12. No focal hepatic lesion noted.   - Hep C viral load in 9/2024 was 12,200,000 from 8,590,000 in 1/2024. Repeated RNA 5/30/2025 was negative.   - Reactive Hep B core Josefina and Surface Josefina with negative Surface Agn, consistent with prior infection. Hep A Ab reactive.     PLAN:   - Obtain Hepatitis C RNA to assess SVR (8/2025).   - Re-order Abdominal US ordered today   - Has currently stopped drinking, not willing to undergo CD.   - Work letter as part of patient instructions  - EGD ordered.  - We would consider liver biopsy next visit if aminotransferases still elevated despite SVR.    Return to Clinic: 6 months    Thank you very much for allowing me to participate in the care of this patient.  If you  have any questions regarding my recommendations, please do not hesitate to contact me.       Patient discussed and seen with Staff Dr. Aviles, who is in agreement with the above.     I spent 52 minutes on this encounter performing the following: reviewing the patient's medical record (clinic visits, hospital records, lab results, imaging and procedural documentation), history taking, physical exam and documentation on the date of the encounter. I also spent part of the time in coordination of care and counseling.    Outpatient  Established level 5 [id number]: 40-54 minutes - RVU 2.80     The longitudinal plan of care for HCV and compensated cirrhosis as documented were addressed during this visit. Due to the added complexity in care, I will continue to support July in the subsequent management and with ongoing continuity of care.      Rc Gutierrez MD  Gastroenterology Fellow   Division of Gastroenterology, Hepatology and Nutrition  [facility]  Pager: [phone]

## 2025-08-02 ENCOUNTER — APPOINTMENT (OUTPATIENT)
Dept: RADIOLOGY | Facility: HOSPITAL | Age: 30
End: 2025-08-02
Attending: EMERGENCY MEDICINE
Payer: COMMERCIAL

## 2025-08-02 ENCOUNTER — HOSPITAL ENCOUNTER (EMERGENCY)
Facility: HOSPITAL | Age: 30
Discharge: HOME | End: 2025-08-02
Attending: EMERGENCY MEDICINE | Admitting: EMERGENCY MEDICINE
Payer: COMMERCIAL

## 2025-08-02 VITALS
OXYGEN SATURATION: 97 % | BODY MASS INDEX: 24.29 KG/M2 | TEMPERATURE: 98.1 F | HEIGHT: 65 IN | HEART RATE: 87 BPM | WEIGHT: 145.8 LBS | DIASTOLIC BLOOD PRESSURE: 74 MMHG | SYSTOLIC BLOOD PRESSURE: 113 MMHG | RESPIRATION RATE: 16 BRPM

## 2025-08-02 DIAGNOSIS — S60.551A FOREIGN BODY OF RIGHT HAND, INITIAL ENCOUNTER: Primary | ICD-10-CM

## 2025-08-02 PROCEDURE — 99283 EMERGENCY DEPT VISIT LOW MDM: CPT | Performed by: EMERGENCY MEDICINE

## 2025-08-02 PROCEDURE — 73130 X-RAY EXAM OF HAND: CPT | Mod: RT

## 2025-08-02 RX ORDER — CEPHALEXIN 500 MG/1
500 CAPSULE ORAL 2 TIMES DAILY
Qty: 14 CAPSULE | Refills: 0 | Status: SHIPPED | OUTPATIENT
Start: 2025-08-02 | End: 2025-08-09

## 2025-08-02 ASSESSMENT — ACTIVITIES OF DAILY LIVING (ADL): ADLS_ACUITY_SCORE: 41

## 2025-08-04 ENCOUNTER — PATIENT OUTREACH (OUTPATIENT)
Dept: CARE COORDINATION | Facility: CLINIC | Age: 30
End: 2025-08-04
Payer: COMMERCIAL

## 2025-08-05 ENCOUNTER — TRANSFERRED RECORDS (OUTPATIENT)
Dept: HEALTH INFORMATION MANAGEMENT | Facility: CLINIC | Age: 30
End: 2025-08-05
Payer: COMMERCIAL

## 2025-08-06 ENCOUNTER — PATIENT OUTREACH (OUTPATIENT)
Dept: CARE COORDINATION | Facility: CLINIC | Age: 30
End: 2025-08-06
Payer: COMMERCIAL

## 2025-08-07 ENCOUNTER — PATIENT OUTREACH (OUTPATIENT)
Dept: CARE COORDINATION | Facility: CLINIC | Age: 30
End: 2025-08-07

## 2025-08-18 ENCOUNTER — PATIENT OUTREACH (OUTPATIENT)
Dept: CARE COORDINATION | Facility: CLINIC | Age: 30
End: 2025-08-18
Payer: COMMERCIAL

## 2025-08-18 DIAGNOSIS — B20 HUMAN IMMUNODEFICIENCY VIRUS (HIV) DISEASE (H): ICD-10-CM

## 2025-08-18 DIAGNOSIS — I27.20 PULMONARY HYPERTENSION (H): ICD-10-CM

## 2025-08-18 DIAGNOSIS — Z86.59 HISTORY OF DEPRESSION: ICD-10-CM

## 2025-08-18 DIAGNOSIS — D64.9 ANEMIA, UNSPECIFIED TYPE: ICD-10-CM

## 2025-08-18 DIAGNOSIS — F10.29 ALCOHOL DEPENDENCE WITH UNSPECIFIED ALCOHOL-INDUCED DISORDER (H): ICD-10-CM

## 2025-08-18 DIAGNOSIS — I50.812 CHRONIC RIGHT-SIDED HEART FAILURE (H): Primary | ICD-10-CM

## 2025-08-18 DIAGNOSIS — D69.6 THROMBOCYTOPENIA: ICD-10-CM

## 2025-08-18 DIAGNOSIS — F11.21 OPIOID DEPENDENCE IN REMISSION (H): ICD-10-CM

## 2025-08-18 DIAGNOSIS — B18.2 CHRONIC HEPATITIS C WITHOUT HEPATIC COMA (H): ICD-10-CM

## 2025-08-21 ENCOUNTER — PATIENT OUTREACH (OUTPATIENT)
Dept: CARE COORDINATION | Facility: CLINIC | Age: 30
End: 2025-08-21
Payer: COMMERCIAL

## (undated) DEVICE — UMMC CONVENIENCE KIT FORMALLY H9656021017160 NEW# 602101716

## (undated) DEVICE — KIT MICROINTRODUCER VASCULAR  4FRX21GAX4CM

## (undated) DEVICE — INTRO SHEATH 7FRX10CM PINNACLE RSS702

## (undated) DEVICE — Device

## (undated) DEVICE — PACK HEART RIGHT CUSTOM SAN32RHF18

## (undated) RX ORDER — LIDOCAINE 40 MG/G
CREAM TOPICAL
Status: DISPENSED
Start: 2024-04-09

## (undated) RX ORDER — LIDOCAINE 40 MG/G
CREAM TOPICAL
Status: DISPENSED
Start: 2025-04-29